# Patient Record
Sex: MALE | Race: BLACK OR AFRICAN AMERICAN | Employment: UNEMPLOYED | URBAN - METROPOLITAN AREA
[De-identification: names, ages, dates, MRNs, and addresses within clinical notes are randomized per-mention and may not be internally consistent; named-entity substitution may affect disease eponyms.]

---

## 2017-04-10 ENCOUNTER — OFFICE VISIT (OUTPATIENT)
Dept: SURGERY | Age: 52
End: 2017-04-10

## 2017-04-10 VITALS
SYSTOLIC BLOOD PRESSURE: 124 MMHG | DIASTOLIC BLOOD PRESSURE: 78 MMHG | TEMPERATURE: 98.8 F | WEIGHT: 182 LBS | HEIGHT: 72 IN | BODY MASS INDEX: 24.65 KG/M2 | OXYGEN SATURATION: 97 % | RESPIRATION RATE: 16 BRPM | HEART RATE: 69 BPM

## 2017-04-10 DIAGNOSIS — L72.12 TRICHILEMMAL CYST: Primary | ICD-10-CM

## 2017-04-10 RX ORDER — CETIRIZINE HCL 10 MG
TABLET ORAL
Refills: 0 | COMMUNITY
Start: 2017-02-28 | End: 2020-10-08

## 2017-04-10 RX ORDER — FUROSEMIDE 20 MG/1
20 TABLET ORAL DAILY
COMMUNITY
End: 2022-09-09

## 2017-04-10 RX ORDER — LISINOPRIL 10 MG/1
TABLET ORAL
Refills: 0 | COMMUNITY
Start: 2017-02-11 | End: 2020-10-08 | Stop reason: DRUGHIGH

## 2017-04-10 RX ORDER — CARVEDILOL 3.12 MG/1
TABLET ORAL
Refills: 6 | COMMUNITY
Start: 2017-03-17 | End: 2020-10-08 | Stop reason: DRUGHIGH

## 2017-04-10 NOTE — PROGRESS NOTES
1. Have you been to the ER, urgent care clinic since your last visit? Hospitalized since your last visit? No    2. Have you seen or consulted any other health care providers outside of the [address] [place] [address] since your last visit? Include any pap smears or colon screening.  No

## 2017-04-10 NOTE — PROGRESS NOTES
HISTORY OF PRESENT ILLNESS  Lore Mccormick is a 46 y.o. male who is referred by [facility], [facility], for further evaluation of a subcutaneous mass on the posterior aspect of his scalp. HPI Comments: Information obtained from family member. Mr. Garlan Snellen has had a subcutaneous mass on the posterior aspect of his scalp for approx. 4 months now. The mass intermittently becomes larger. Tx'd with abx and the mass decreases in size. He has otherwise been in his usual state of health. Past Medical History:  No date: Acid reflux  No date: Alcohol abuse  No date: Arthropathy, unspecified, site unspecified  No date: Atrial fibrillation ([address]  No date: Calcaneus fracture  No date: Condyloma  No date: Depression  No date: Essential hypertension  2006: H/O mitral valve repair      Comment: Along with MAZE procedure  No date: High cholesterol  No date: History of angina  No date: Hypertension  No date: IHSS (idiopathic hypertrophic subaortic stenos*  No date: Impotence of organic origin  05/2015: LA thrombus      Comment: LA and ARNULFO thrombus  2006: Pacemaker  No date: Thyroid disease  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 No pertinent family history. Social History: Employment - Disability. Tobacco - Black and Milds, one per day. EtOH - Beer, approx. 12 per week. Review of systems negative except as noted. Review of Systems   Constitutional:        Decreased appetite. HENT:        Sinus problems. Gastrointestinal: Positive for constipation and diarrhea. Psychiatric/Behavioral: Positive for depression. The patient is nervous/anxious.         Physical Exam   Constitutional: He appears well-developed and well-nourished. No distress. HENT:   Head: Normocephalic and atraumatic. Eyes: No scleral icterus. Neck: Neck supple. Cardiovascular: Normal rate and regular rhythm. Pulmonary/Chest: Effort normal and breath sounds normal.   Abdominal: Soft. He exhibits no distension. There is no tenderness. There is no rebound and no guarding. Musculoskeletal: Normal range of motion. Lymphadenopathy:     He has no cervical adenopathy. Neurological: He is alert. Skin:        Approx. 1cm x 1.5cm, well circumscribed, freely movable subcutaneous mass. No active infection. Clinically, this is c/w a trichilemmal cyst.  Well healed surgical scars on neck and chest.   Vitals reviewed. ASSESSMENT and PLAN  In view of the findings on H and P, Mr. Shannon Victoria should benefit from excision of the trichilemmal cyst as it is bothersome to him. Discussed procedure with him including risks of bleeding, infection, recurrence. He understands and wishes to proceed. I have tentatively scheduled Mr. Shannon Victoria for surgery on April 20, 2017 at [facility] and will see him back in the office postoperatively. He is agreeable to this plan of action and is most certainly free to contact the office should any questions or concerns arise. CC: Maggie Vail MD    [facility], [facility].

## 2017-04-10 NOTE — MR AVS SNAPSHOT
Visit Information Date & Time Provider Department Dept. Phone Encounter #  
 4/10/2017  2:20 PM MD [facility] [address] [phone] [id number] Upcoming Health Maintenance Date Due DTaP/Tdap/Td series (1 - Tdap) 9/24/1984 FOBT Q 1 YEAR AGE 50-75 9/24/2013 INFLUENZA AGE 9 TO ADULT 8/1/2016 Pneumococcal 19-64 Highest Risk (2 of 3 - PCV13) 8/18/2016 Allergies as of 4/10/2017  Review Complete On: 4/10/2017 By: Lauren Angel LPN Severity Noted Reaction Type Reactions Bactrim [Sulfamethoxazole-trimethoprim]  07/26/2012    Other (comments) GI Distress Current Immunizations  Reviewed on 8/13/2015 Name Date Pneumococcal Polysaccharide (PPSV-23) 8/18/2015  9:46 AM  
  
 Not reviewed this visit Vitals BP Pulse Temp Resp Height(growth percentile) Weight(growth percentile) 124/78 (BP 1 Location: Right arm, BP Patient Position: Sitting) 69 98.8 °F (37.1 °C) (Oral) 16 6' (1.829 m) 182 lb (82.6 kg) SpO2 BMI Smoking Status 97% 24.68 kg/m2 Former Smoker BMI and BSA Data Body Mass Index Body Surface Area  
 24.68 kg/m 2 2.05 m 2 Your Updated Medication List  
  
   
This list is accurate as of: 4/10/17  2:42 PM.  Always use your most recent med list.  
  
  
  
  
 carvedilol 3.125 mg tablet Commonly known as:  COREG  
TAKE 1 TABLET BY ORAL ROUTE 2 TIMES EVERY DAY  
  
 cetirizine 10 mg tablet Commonly known as:  ZYRTEC  
TAKE 1 TABLET BY MOUTH 2 TIMES A DAY FLUoxetine 20 mg capsule Commonly known as:  PROzac Take 80 mg by mouth daily. folic acid 1 mg tablet Commonly known as:  [employer] Take 1 mg by mouth daily. furosemide 20 mg tablet Commonly known as:  LASIX Take  by mouth daily. LIPITOR 20 mg tablet Generic drug:  atorvastatin Take 40 mg by mouth daily. lisinopril 10 mg tablet Commonly known as:  Louise Powers  
 TAKE 1 TABLET BY MOUTH EVERY DAY  
  
 mupirocin 2 % ointment Commonly known as:  [employer] Apply  to affected area three (3) times daily. ondansetron 4 mg disintegrating tablet Commonly known as:  ZOFRAN ODT Take 1 Tab by mouth every eight (8) hours as needed for Nausea. pantoprazole 40 mg tablet Commonly known as:  PROTONIX Take 40 mg by mouth daily. synthroid 25 mcg tablet Generic drug:  levothyroxine Take 50 mcg by mouth Daily (before breakfast). traZODone 50 mg tablet Commonly known as:  Blanca Ludwig Take 50 mg by mouth nightly. Indications: 1-3 tabs at bedtime. VENTOLIN HFA 90 mcg/actuation inhaler Generic drug:  albuterol Take 1 Puff by inhalation. XARELTO 20 mg Tab tablet Generic drug:  rivaroxaban Take 20 mg by mouth daily. Introducing [facility]! Cass Rivera introduces [employer] patient portal. Now you can access parts of your medical record, email your doctor's office, and request medication refills online. 1. In your internet browser, go to [email or web address] 2. Click on the First Time User? Click Here link in the Sign In box. You will see the New Member Sign Up page. 3. Enter your [employer]) Access Code exactly as it appears below. You will not need to use this code after youve completed the sign-up process. If you do not sign up before the expiration date, you must request a new code. · [employer] ([employer]) Access Code: [email or web address] Expires: 7/9/2017  2:42 PM 
 
4. Enter the last four digits of your Social Security Number (xxxx) and Date of Birth (mm/dd/yyyy) as indicated and click Submit. You will be taken to the next sign-up page. 5. Create a [employer] ([employer]) ID. This will be your [employer] login ID and cannot be changed, so think of one that is secure and easy to remember. 6. Create a [employer]) password. You can change your password at any time. 7. Enter your Password Reset Question and Answer. This can be used at a later time if you forget your password. 8. Enter your e-mail address. You will receive e-mail notification when new information is available in [address] 9. Click Sign Up. You can now view and download portions of your medical record. 10. Click the Download Summary menu link to download a portable copy of your medical information. If you have questions, please visit the Frequently Asked Questions section of the [employer] website. Remember, [employer] is NOT to be used for urgent needs. For medical emergencies, dial 911. Now available from your iPhone and Android! Please provide this summary of care documentation to your next provider. Your primary care clinician is listed as Alyson Meléndez. If you have any questions after today's visit, please call [phone].

## 2017-04-11 ENCOUNTER — TELEPHONE (OUTPATIENT)
Dept: SURGERY | Age: 52
End: 2017-04-11

## 2017-04-11 RX ORDER — BUPIVACAINE HYDROCHLORIDE 2.5 MG/ML
30 INJECTION, SOLUTION EPIDURAL; INFILTRATION; INTRACAUDAL ONCE
Status: CANCELLED | OUTPATIENT
Start: 2017-04-11 | End: 2017-04-11

## 2017-04-14 ENCOUNTER — HOSPITAL ENCOUNTER (OUTPATIENT)
Dept: PREADMISSION TESTING | Age: 52
Discharge: HOME | End: 2017-04-14
Payer: MEDICARE

## 2017-04-14 ENCOUNTER — HOSPITAL ENCOUNTER (OUTPATIENT)
Dept: GENERAL RADIOLOGY | Age: 52
Discharge: HOME | End: 2017-04-14
Attending: SURGERY
Payer: MEDICARE

## 2017-04-14 VITALS
SYSTOLIC BLOOD PRESSURE: 125 MMHG | TEMPERATURE: 97.6 F | WEIGHT: 180 LBS | RESPIRATION RATE: 16 BRPM | DIASTOLIC BLOOD PRESSURE: 84 MMHG | BODY MASS INDEX: 28.25 KG/M2 | HEIGHT: 67 IN | HEART RATE: 61 BPM

## 2017-04-14 LAB
ANION GAP BLD CALC-SCNC: 11 MMOL/L (ref 5–15)
APTT PPP: 27.3 SEC (ref 22.1–32.5)
ATRIAL RATE: 65 BPM
BASOPHILS # BLD AUTO: 0 K/UL (ref 0–0.1)
BASOPHILS # BLD: 1 % (ref 0–1)
BUN SERPL-MCNC: 17 MG/DL (ref 6–20)
BUN/CREAT SERPL: 14 (ref 12–20)
CALCIUM SERPL-MCNC: 9.7 MG/DL (ref 8.5–10.1)
CALCULATED P AXIS, ECG09: 53 DEGREES
CALCULATED R AXIS, ECG10: -8 DEGREES
CALCULATED T AXIS, ECG11: 46 DEGREES
CHLORIDE SERPL-SCNC: 100 MMOL/L (ref 97–108)
CO2 SERPL-SCNC: 26 MMOL/L (ref 21–32)
CREAT SERPL-MCNC: 1.24 MG/DL (ref 0.7–1.3)
DIAGNOSIS, 93000: NORMAL
DIFFERENTIAL METHOD BLD: ABNORMAL
EOSINOPHIL # BLD: 0.1 K/UL (ref 0–0.4)
EOSINOPHIL NFR BLD: 2 % (ref 0–7)
ERYTHROCYTE [DISTWIDTH] IN BLOOD BY AUTOMATED COUNT: 17.4 % (ref 11.5–14.5)
GLUCOSE SERPL-MCNC: 93 MG/DL (ref 65–100)
HCT VFR BLD AUTO: 47.9 % (ref 36.6–50.3)
HGB BLD-MCNC: 15.7 G/DL (ref 12.1–17)
INR PPP: 1.1 (ref 0.9–1.1)
LYMPHOCYTES # BLD AUTO: 25 % (ref 12–49)
LYMPHOCYTES # BLD: 1.2 K/UL (ref 0.8–3.5)
MCH RBC QN AUTO: 22.9 PG (ref 26–34)
MCHC RBC AUTO-ENTMCNC: 32.8 G/DL (ref 30–36.5)
MCV RBC AUTO: 69.8 FL (ref 80–99)
MONOCYTES # BLD: 0.6 K/UL (ref 0–1)
MONOCYTES NFR BLD AUTO: 12 % (ref 5–13)
NEUTS SEG # BLD: 2.9 K/UL (ref 1.8–8)
NEUTS SEG NFR BLD AUTO: 60 % (ref 32–75)
P-R INTERVAL, ECG05: 308 MS
PLATELET # BLD AUTO: 162 K/UL (ref 150–400)
POTASSIUM SERPL-SCNC: 4.6 MMOL/L (ref 3.5–5.1)
PROTHROMBIN TIME: 10.5 SEC (ref 9–11.1)
Q-T INTERVAL, ECG07: 452 MS
QRS DURATION, ECG06: 98 MS
QTC CALCULATION (BEZET), ECG08: 470 MS
RBC # BLD AUTO: 6.86 M/UL (ref 4.1–5.7)
RBC MORPH BLD: ABNORMAL
RBC MORPH BLD: ABNORMAL
SODIUM SERPL-SCNC: 137 MMOL/L (ref 136–145)
THERAPEUTIC RANGE,PTTT: NORMAL SECS (ref 58–77)
VENTRICULAR RATE, ECG03: 65 BPM
WBC # BLD AUTO: 4.8 K/UL (ref 4.1–11.1)

## 2017-04-14 PROCEDURE — 36415 COLL VENOUS BLD VENIPUNCTURE: CPT | Performed by: SURGERY

## 2017-04-14 PROCEDURE — 93005 ELECTROCARDIOGRAM TRACING: CPT

## 2017-04-14 PROCEDURE — 85025 COMPLETE CBC W/AUTO DIFF WBC: CPT | Performed by: SURGERY

## 2017-04-14 PROCEDURE — 85610 PROTHROMBIN TIME: CPT | Performed by: ANESTHESIOLOGY

## 2017-04-14 PROCEDURE — 71020 XR CHEST PA LAT: CPT

## 2017-04-14 PROCEDURE — 85730 THROMBOPLASTIN TIME PARTIAL: CPT | Performed by: ANESTHESIOLOGY

## 2017-04-14 PROCEDURE — 80048 BASIC METABOLIC PNL TOTAL CA: CPT | Performed by: SURGERY

## 2017-04-14 NOTE — PROGRESS NOTES
ekg explained to pt, ekg completed after no questions noted. ekg transmitted. zpt transported to radiology with noting to Ms. Starla Chaparro , that pt needs labs after.hard copy to Hemal Parks rn in [place]

## 2017-04-14 NOTE — PERIOP NOTES
[address]  Preoperative Instructions      Surgery Date 04/20/2017              Time of Arrival  7:30    1. On the day of your surgery, please report to the [facility] Entrance. If arriving prior to 7 AM please enter through the Emergency Room Entrance. 2. You must have a responsible adult to drive you to the hospital, stay at the hospital during your surgery and drive you home. You should have someone stay with you for the first 24 hours after your surgery. You should not drive a car for 24 hours following surgery. 3. Do not have anything to eat or drink ( including water, gum, mints, coffee, juice) after midnight       his may not apply to medications prescribed by your physician. Please note special instructions, if applicable. If you are currently taking Plavix, Coumadin, or other blood-thinning agents, contact your surgeon for instructions. 4. We recommend you do not drink any alcoholic beverages for 24 hours before and after your surgery. 5. Have a list of all current medications, including vitamins, herbal supplements and any other over the counter medications. Stop Asprin and non-steroidal anti-inflammatory drugs (I.e. Advil, Aleve) as directed by your surgeon's office. Stop all vitamins and herbal supplements seven days prior to your surgery. 6. Wear comfortable clothes. Wear glasses instead of contacts. Do not bring any money or jewelry. Do not wear make-up, particularly mascara, the morning of your surgery. Do not wear nail polish, particularly if you are having foot /hand surgery. Wear your hair loose or down, no ponytails, buns, debi pins or clips. All body piercings must be removed. Please shower before surgery with an antibacterial soap (Safeguard/Dial) and use a clean towel. Do not apply any lotions, powders, cologne, perfume or deodorants afterward.     Do not shave the area around your surgical incision for at least two to three days prior to your surgery. If you wear glasses, contacts, dentures and/or hearing aids, they will be removed prior to surgery. 7. You should understand that if you do not follow these instructions your surgery may be cancelled. If your physical condition changes (I.e. fever, cold or flu) please contact your surgeon as soon as possible. 8. It is important that you be on time. If a situation occurs where you may be late, please call [phone]    9. If you are feeling sick before surgery, call your surgeon. He or she will tell you what to do. If you are sick on the day of your surgery please call [phone].     10. If you have any questions and or problems, please call [phone] (Pre-admission Testing). 11. Your surgery time may be subject to change. You will receive a phone call the evening before your surgery if your time changes. Special Instructions: Stop taking the Xarelto on Monday as told by Dr. Villa Moy. Take Lisinopril and Carvedilol with a sip of water the morning of surgery. MEDICATIONS TO TAKE THE MORNING OF SURGERY WITH A SIP OF WATER:      I understand a pre-operative phone call will be made to verify my surgery time.   In the event that I am not available, I give permission for a message to be left on my answering service and/or with another person?   ________________      ___________________  _________  (Signature of Patient)          (Witness)                              (Date and Time)

## 2017-04-20 ENCOUNTER — HOSPITAL ENCOUNTER (OUTPATIENT)
Age: 52
Setting detail: OUTPATIENT SURGERY
Discharge: HOME | End: 2017-04-20
Attending: SURGERY | Admitting: SURGERY
Payer: MEDICARE

## 2017-04-20 ENCOUNTER — ANESTHESIA (OUTPATIENT)
Dept: SURGERY | Age: 52
End: 2017-04-20
Payer: MEDICARE

## 2017-04-20 ENCOUNTER — ANESTHESIA EVENT (OUTPATIENT)
Dept: SURGERY | Age: 52
End: 2017-04-20
Payer: MEDICARE

## 2017-04-20 VITALS
OXYGEN SATURATION: 99 % | DIASTOLIC BLOOD PRESSURE: 68 MMHG | HEIGHT: 67 IN | HEART RATE: 62 BPM | WEIGHT: 180 LBS | SYSTOLIC BLOOD PRESSURE: 102 MMHG | TEMPERATURE: 98 F | RESPIRATION RATE: 16 BRPM | BODY MASS INDEX: 28.25 KG/M2

## 2017-04-20 PROCEDURE — 76210000063 HC OR PH I REC FIRST 0.5 HR: Performed by: SURGERY

## 2017-04-20 PROCEDURE — 77030011640 HC PAD GRND REM COVD -A: Performed by: SURGERY

## 2017-04-20 PROCEDURE — 76010000138 HC OR TIME 0.5 TO 1 HR: Performed by: SURGERY

## 2017-04-20 PROCEDURE — 76210000020 HC REC RM PH II FIRST 0.5 HR: Performed by: SURGERY

## 2017-04-20 PROCEDURE — 77030018836 HC SOL IRR NACL ICUM -A: Performed by: SURGERY

## 2017-04-20 PROCEDURE — 74011000250 HC RX REV CODE- 250: Performed by: SURGERY

## 2017-04-20 PROCEDURE — 77030002916 HC SUT ETHLN J&J -A: Performed by: SURGERY

## 2017-04-20 PROCEDURE — 76060000032 HC ANESTHESIA 0.5 TO 1 HR: Performed by: SURGERY

## 2017-04-20 PROCEDURE — 74011250636 HC RX REV CODE- 250/636

## 2017-04-20 PROCEDURE — 77030031139 HC SUT VCRL2 J&J -A: Performed by: SURGERY

## 2017-04-20 PROCEDURE — 88304 TISSUE EXAM BY PATHOLOGIST: CPT | Performed by: SURGERY

## 2017-04-20 RX ORDER — SODIUM CHLORIDE, SODIUM LACTATE, POTASSIUM CHLORIDE, CALCIUM CHLORIDE 600; 310; 30; 20 MG/100ML; MG/100ML; MG/100ML; MG/100ML
INJECTION, SOLUTION INTRAVENOUS
Status: DISCONTINUED | OUTPATIENT
Start: 2017-04-20 | End: 2017-04-20 | Stop reason: HOSPADM

## 2017-04-20 RX ORDER — BACITRACIN 500 [USP'U]/G
OINTMENT TOPICAL AS NEEDED
Status: DISCONTINUED | OUTPATIENT
Start: 2017-04-20 | End: 2017-04-20 | Stop reason: HOSPADM

## 2017-04-20 RX ORDER — MIDAZOLAM HYDROCHLORIDE 1 MG/ML
INJECTION, SOLUTION INTRAMUSCULAR; INTRAVENOUS AS NEEDED
Status: DISCONTINUED | OUTPATIENT
Start: 2017-04-20 | End: 2017-04-20 | Stop reason: HOSPADM

## 2017-04-20 RX ORDER — BUPIVACAINE HYDROCHLORIDE 2.5 MG/ML
30 INJECTION, SOLUTION EPIDURAL; INFILTRATION; INTRACAUDAL ONCE
Status: DISCONTINUED | OUTPATIENT
Start: 2017-04-20 | End: 2017-04-20 | Stop reason: HOSPADM

## 2017-04-20 RX ORDER — HYDROCODONE BITARTRATE AND ACETAMINOPHEN 5; 325 MG/1; MG/1
1 TABLET ORAL
Qty: 6 TAB | Refills: 0 | Status: SHIPPED | OUTPATIENT
Start: 2017-04-20 | End: 2022-09-09

## 2017-04-20 RX ORDER — CEFAZOLIN SODIUM IN 0.9 % NACL 2 G/100 ML
PLASTIC BAG, INJECTION (ML) INTRAVENOUS AS NEEDED
Status: DISCONTINUED | OUTPATIENT
Start: 2017-04-20 | End: 2017-04-20 | Stop reason: HOSPADM

## 2017-04-20 RX ORDER — FENTANYL CITRATE 50 UG/ML
INJECTION, SOLUTION INTRAMUSCULAR; INTRAVENOUS AS NEEDED
Status: DISCONTINUED | OUTPATIENT
Start: 2017-04-20 | End: 2017-04-20 | Stop reason: HOSPADM

## 2017-04-20 RX ORDER — CEFAZOLIN SODIUM IN 0.9 % NACL 2 G/100 ML
PLASTIC BAG, INJECTION (ML) INTRAVENOUS
Status: DISCONTINUED
Start: 2017-04-20 | End: 2017-04-20 | Stop reason: HOSPADM

## 2017-04-20 RX ORDER — CEFAZOLIN SODIUM IN 0.9 % NACL 2 G/100 ML
2 PLASTIC BAG, INJECTION (ML) INTRAVENOUS
Status: DISCONTINUED | OUTPATIENT
Start: 2017-04-20 | End: 2017-04-20 | Stop reason: HOSPADM

## 2017-04-20 RX ORDER — BUPIVACAINE HYDROCHLORIDE AND EPINEPHRINE 2.5; 5 MG/ML; UG/ML
INJECTION, SOLUTION EPIDURAL; INFILTRATION; INTRACAUDAL; PERINEURAL AS NEEDED
Status: DISCONTINUED | OUTPATIENT
Start: 2017-04-20 | End: 2017-04-20 | Stop reason: HOSPADM

## 2017-04-20 RX ADMIN — MIDAZOLAM HYDROCHLORIDE 1 MG: 1 INJECTION, SOLUTION INTRAMUSCULAR; INTRAVENOUS at 08:57

## 2017-04-20 RX ADMIN — SODIUM CHLORIDE, SODIUM LACTATE, POTASSIUM CHLORIDE, CALCIUM CHLORIDE: 600; 310; 30; 20 INJECTION, SOLUTION INTRAVENOUS at 08:25

## 2017-04-20 RX ADMIN — MIDAZOLAM HYDROCHLORIDE 1 MG: 1 INJECTION, SOLUTION INTRAMUSCULAR; INTRAVENOUS at 09:11

## 2017-04-20 RX ADMIN — FENTANYL CITRATE 50 MCG: 50 INJECTION, SOLUTION INTRAMUSCULAR; INTRAVENOUS at 09:01

## 2017-04-20 RX ADMIN — Medication 2 G: at 08:46

## 2017-04-20 RX ADMIN — MIDAZOLAM HYDROCHLORIDE 1 MG: 1 INJECTION, SOLUTION INTRAMUSCULAR; INTRAVENOUS at 09:05

## 2017-04-20 RX ADMIN — FENTANYL CITRATE 50 MCG: 50 INJECTION, SOLUTION INTRAMUSCULAR; INTRAVENOUS at 08:57

## 2017-04-20 RX ADMIN — MIDAZOLAM HYDROCHLORIDE 2 MG: 1 INJECTION, SOLUTION INTRAMUSCULAR; INTRAVENOUS at 08:48

## 2017-04-20 NOTE — IP AVS SNAPSHOT
[address] [facility] 
 
 
 [address] Patient: Kiel Herrera MRN: [id number]:[id number] You are allergic to the following Allergen Reactions Bactrim (Sulfamethoxazole-Trimethoprim) Other (comments) GI Distress Recent Documentation Height Weight BMI Smoking Status 1.702 m 81.6 kg 28.19 kg/m2 Light Tobacco Smoker Emergency Contacts Name Discharge Info Relation Home Work Mobile WagnerPablo DISCHARGE CAREGIVER [3] Other Relative [6] [phone] About your hospitalization You were admitted on:  April 20, 2017 You last received care in the:  [facility] PACU/[facility] You were discharged on:  April 20, 2017 Unit phone number:  [phone] Why you were hospitalized Your primary diagnosis was:  Not on File Providers Seen During Your Hospitalizations Provider Role Specialty Primary office phone Mirian Taylor MD Attending Provider General Surgery [phone] Your Primary Care Physician (PCP) Primary Care Physician Office Phone Office Fax Lavell Henning [phone] Follow-up Information Follow up With Details Comments Contact Info Clay Knox MD   [address] [facility] [address] [address] 
[phone] Your Appointments Monday May 01, 2017  1:00 PM EDT  
POST OP 10 MIN with Arnold Foster NP  
[facility] [address] ([facility]) [address] [facility] [address] [phone] Thursday May 04, 2017  1:20 PM EDT  
POST OP 10 MIN with Mirian Taylor MD  
[address] [facility] ([facility] [address] [phone] Current Discharge Medication List  
  
START taking these medications Dose & Instructions Dispensing Information Comments Morning Noon Evening Bedtime HYDROcodone-acetaminophen 5-325 mg per tablet Commonly known as:  Cheo Ojedar Your last dose was: Your next dose is:    
   
   
 Dose:  1 Tab Take 1 Tab by mouth every four (4) hours as needed for Pain. Max Daily Amount: 6 Tabs. Quantity:  6 Tab Refills:  0 CONTINUE these medications which have NOT CHANGED Dose & Instructions Dispensing Information Comments Morning Noon Evening Bedtime  
 carvedilol 3.125 mg tablet Commonly known as:  Madeleine Velasco Your last dose was: Your next dose is: TAKE 1 TABLET BY ORAL ROUTE 2 TIMES EVERY DAY Refills:  6  
     
   
   
   
  
 cetirizine 10 mg tablet Commonly known as:  ZYRTEC Your last dose was: Your next dose is: TAKE 1 TABLET BY MOUTH 2 TIMES A DAY Refills:  0 FLUoxetine 20 mg capsule Commonly known as:  PROzac Your last dose was: Your next dose is:    
   
   
 Dose:  80 mg Take 80 mg by mouth daily. Refills:  0  
     
   
   
   
  
 folic acid 1 mg tablet Commonly known as:  [employer] Your last dose was: Your next dose is:    
   
   
 Dose:  1 mg Take 1 mg by mouth daily. Refills:  0  
     
   
   
   
  
 furosemide 20 mg tablet Commonly known as:  LASIX Your last dose was: Your next dose is: Take  by mouth daily. Refills:  0 LIPITOR 20 mg tablet Generic drug:  atorvastatin Your last dose was: Your next dose is:    
   
   
 Dose:  40 mg Take 40 mg by mouth daily. Refills:  0  
     
   
   
   
  
 lisinopril 10 mg tablet Commonly known as:  Minna Hernandez Your last dose was: Your next dose is: TAKE 1 TABLET BY MOUTH EVERY DAY Refills:  0  
     
   
   
   
  
 mupirocin 2 % ointment Commonly known as:  [facility] Your last dose was: Your next dose is:    
   
   
 Apply  to affected area three (3) times daily. Quantity:  22 g Refills:  0  
     
   
   
   
  
 ondansetron 4 mg disintegrating tablet Commonly known as:  ZOFRAN ODT Your last dose was: Your next dose is:    
   
   
 Dose:  4 mg Take 1 Tab by mouth every eight (8) hours as needed for Nausea. Quantity:  10 Tab Refills:  0  
     
   
   
   
  
 pantoprazole 40 mg tablet Commonly known as:  PROTONIX Your last dose was: Your next dose is:    
   
   
 Dose:  40 mg Take 40 mg by mouth daily. Refills:  0  
     
   
   
   
  
 synthroid 25 mcg tablet Generic drug:  levothyroxine Your last dose was: Your next dose is:    
   
   
 Dose:  50 mcg Take 50 mcg by mouth Daily (before breakfast). Refills:  0  
     
   
   
   
  
 traZODone 50 mg tablet Commonly known as:  Lequita Gallus Your last dose was: Your next dose is:    
   
   
 Dose:  50 mg Take 50 mg by mouth nightly. Indications: 1-3 tabs at bedtime. Refills:  0 VENTOLIN HFA 90 mcg/actuation inhaler Generic drug:  albuterol Your last dose was: Your next dose is:    
   
   
 Dose:  1 Puff Take 1 Puff by inhalation. Refills:  0 XARELTO 20 mg Tab tablet Generic drug:  rivaroxaban Your last dose was: Your next dose is:    
   
   
 Dose:  20 mg Take 20 mg by mouth daily. Refills:  0 Where to Get Your Medications Information on where to get these meds will be given to you by the nurse or doctor. ! Ask your nurse or doctor about these medications HYDROcodone-acetaminophen 5-325 mg per tablet Discharge Instructions DO NOT TAKE TYLENOL/ACETAMINOPHEN WITH PERCOCET, LORTAB, [address] TAKE NARCOTIC PAIN MEDICATIONS WITH FOOD. DO NOT DRIVE WHILE TAKING NARCOTIC PAIN MEDICATIONS. DISCHARGE SUMMARY from Nurse The following personal items collected during your admission are returned to you:  
Dental Appliance: Dental Appliances: None Vision:   
Hearing Aid:   
Jewelry: Jewelry: Earrings (given to sister) Clothing: Clothing:  (street clothes) Other Valuables: Other Valuables: None Valuables sent to safe:   
 
PATIENT INSTRUCTIONS: 
 
After general anesthesia or intravenous sedation, for 24 hours or while taking prescription Narcotics: 
      Someone should be with you for the next 24 hours. For your own safety, a responsible adult must drive you home. · Limit your activities · Do not drive and operate hazardous machinery · Do not make important personal or business decisions · Do  not drink alcoholic beverages · If you have not urinated within 8 hours after discharge, please contact your surgeon on call. Report the following to your surgeon: 
· Excessive pain, swelling, redness or odor of or around the surgical area · Temperature over 100.5 · Nausea and vomiting lasting longer than 4 hours or if unable to take medications · Any signs of decreased circulation or nerve impairment to extremity: change in color, persistent  numbness, tingling, coldness or increase pain ·  
·  
· You will receive a Post Operative Call from one of the Recovery Room Nurses on the day after your surgery to check on you. It is very important for us to know how you are recovering after your surgery. ·  
· You may receive a call from Dori who will survey your experience here at [facility]. Please take the time to complete the phone survey when called, as your feedback and suggestions are very important to us. Our goal is to provide you excellent care in a safe and friendly environment. ·  
·  
· We wish youre a speedy recovery ? What to do at Home: 
 
Recommended activity: Activity as tolerated and no driving for today, Do not climb stairs or shower unattended for the next 24 hours. *  Please give a list of your current medications to your Primary Care Provider. *  Please update this list whenever your medications are discontinued, doses are 
    changed, or new medications (including over-the-counter products) are added. *  Please carry medication information at all times in case of emergency situations. These are general instructions for a healthy lifestyle: No smoking/ No tobacco products/ Avoid exposure to second hand smoke Surgeon General's Warning:  Quitting smoking now greatly reduces serious risk to your health. Obesity, smoking, and sedentary lifestyle greatly increases your risk for illness A healthy diet, regular physical exercise & weight monitoring are important for maintaining a healthy lifestyle You may be retaining fluid if you have a history of heart failure or if you experience any of the following symptoms:  Weight gain of 3 pounds or more overnight or 5 pounds in a week, increased swelling in our hands or feet or shortness of breath while lying flat in bed. Please call your doctor as soon as you notice any of these symptoms; do not wait until your next office visit. Recognize signs and symptoms of STROKE: 
 
F-face looks uneven A-arms unable to move or move even S-speech slurred or non-existent T-time-call 911 as soon as signs and symptoms begin-DO NOT go Back to bed or wait to see if you get better-TIME IS BRAIN. If you have not received your influenza and/or pneumococcal vaccine, please follow up with your primary care physician. The discharge information has been reviewed with the patient and caregiver. The patient and caregiver verbalized understanding. Patient Discharge Instructions Minnie / [id number] : 1965 Admitted 2017 Discharged: 2017 · It is important that you take the medication exactly as they are prescribed. · Keep your medication in the bottles provided by the pharmacist and keep a list of the medication names, dosages, and times to be taken in your wallet. · Do not take other medications without consulting your doctor. What to do at [facility] Recommended diet: Regular. Recommended activity: No Restrictions. May Take Shower or [place] Roxo. Can Restart Xarelto on 4/21/2017. If you experience any of the following symptoms Fevers, Chills, Nausea, Vomitting, Redness or Drainage at Surgical Site(s) or Any Other Questions or Concerns Please Call -  [phone]. Follow-up with Mary Villafuerte NP on May 1, 2017 at 1:00PM at [address] Information obtained by : 
I understand that if any problems occur once I am at home I am to contact my physician. I understand and acknowledge receipt of the instructions indicated above. Physician's or R.N.'s Signature                                                                  Date/Time Patient or Representative Signature                                                          Date/Time Discharge Orders None Introducing [facility] SERVICES! Karri Harding introduces [employer] patient portal. Now you can access parts of your medical record, email your doctor's office, and request medication refills online. 1. In your internet browser, go to [email or web address] 2. Click on the First Time User? Click Here link in the Sign In box. You will see the New Member Sign Up page. 3. Enter your [email or web address] Access Code exactly as it appears below. You will not need to use this code after youve completed the sign-up process.  If you do not sign up before the expiration date, you must request a new code. · [email or web address] Access Code: [email or web address] Expires: 7/9/2017  2:42 PM 
 
4. Enter the last four digits of your Social Security Number (xxxx) and Date of Birth (mm/dd/yyyy) as indicated and click Submit. You will be taken to the next sign-up page. 5. Create a cube19 ID. This will be your cube19 login ID and cannot be changed, so think of one that is secure and easy to remember. 6. Create a cube19 password. You can change your password at any time. 7. Enter your Password Reset Question and Answer. This can be used at a later time if you forget your password. 8. Enter your e-mail address. You will receive e-mail notification when new information is available in [address] 9. Click Sign Up. You can now view and download portions of your medical record. 10. Click the Download Summary menu link to download a portable copy of your medical information. If you have questions, please visit the Frequently Asked Questions section of the cube19 website. Remember, cube19 is NOT to be used for urgent needs. For medical emergencies, dial 911. Now available from your iPhone and Android! General Information Please provide this summary of care documentation to your next provider. Patient Signature:  ____________________________________________________________ Date:  ____________________________________________________________  
  
Ulises Breath Provider Signature:  ____________________________________________________________ Date:  ____________________________________________________________

## 2017-04-20 NOTE — H&P
Date of Surgery Update:  Edgard Hou was seen and examined. History and physical has been reviewed. The patient has been examined. There have been no significant clinical changes since the completion of the originally dated History and Physical.  Patient identified by surgeon; surgical site was confirmed by patient and surgeon. Signed By: Paty Angel MD     April 20, 2017 8:12 AM         Please note from the office and include the additional information below:    Past Medical History  Past Medical History:   Diagnosis Date    Acid reflux     Alcohol abuse     Arthropathy, unspecified, site unspecified     Atrial fibrillation ([address]     Calcaneus fracture     Condyloma     Depression     Essential hypertension     H/O mitral valve repair 2006    Along with MAZE procedure    High cholesterol     History of angina     Hypertension     IHSS (idiopathic hypertrophic subaortic stenosis) ([address]     Ill-defined condition     pacemaker    Impotence of organic origin     LA thrombus 05/2015    LA and ARNULFO thrombus    Pacemaker 2006    Thyroid disease     Trichilemmal cyst 4/10/2017    Unspecified cerebral artery occlusion with cerebral infarction         Past Surgical History  Past Surgical History:   Procedure Laterality Date    CARDIAC SURG PROCEDURE UNLIST  07/13/2006    Mitral Valve Replacement    HX APPENDECTOMY      HX CYST REMOVAL      HX HEART CATHETERIZATION      HX OTHER SURGICAL  4/2005    HX PACEMAKER  07/14/2006        Social History  The patient Edgard Hou  reports that he has been smoking. He has never used smokeless tobacco. He reports that he drinks alcohol. He reports that he does not use illicit drugs. Family History  History reviewed. No pertinent family history.

## 2017-04-20 NOTE — BRIEF OP NOTE
BRIEF OPERATIVE NOTE    Date of Procedure: 4/20/2017   Preoperative Diagnosis:  Scalp Mass. Postoperative Diagnosis:  Same. Procedure(s):   Excise Scalp Mass. Surgeon(s) and Role:   Filemon Strauss MD - Primary  Surgical Staff:  Circ-1: Ronda Emmanuel  Scrub Tech-1: Yvon Dong  Float Staff: Juan Jensen RN  Event Time In   Incision Start 4901   Incision Close 1548     Anesthesia: MAC   Estimated Blood Loss: Minimal.  Specimens:   ID Type Source Tests Collected by Time Destination   1 : Scalp Mass Preservative Scalp  Filemon Strauss MD 4/20/2017 0075 Pathology      Findings: Less than 1cm x 1cm subcutaneous mass. Clinically, c/w a trichilemmal cyst.   Complications: None.   Implants: * No implants in log *

## 2017-04-20 NOTE — ANESTHESIA PREPROCEDURE EVALUATION
Anesthetic History   No history of anesthetic complications            Review of Systems / Medical History  Patient summary reviewed and pertinent labs reviewed    Pulmonary            Asthma        Neuro/Psych       CVA  TIA and psychiatric history     Cardiovascular    Hypertension        Dysrhythmias   Pacemaker         GI/Hepatic/Renal     GERD           Endo/Other      Hypothyroidism       Other Findings              Physical Exam    Airway  Mallampati: I  TM Distance: > 6 cm  Neck ROM: normal range of motion   Mouth opening: Normal     Cardiovascular    Rhythm: regular  Rate: normal         Dental    Dentition: Upper dentition intact and Lower dentition intact     Pulmonary  Breath sounds clear to auscultation               Abdominal  GI exam deferred       Other Findings            Anesthetic Plan    ASA: 3  Anesthesia type: MAC            Anesthetic plan and risks discussed with: Patient

## 2017-04-20 NOTE — DISCHARGE INSTRUCTIONS
DO NOT TAKE TYLENOL/ACETAMINOPHEN WITH PERCOCET, LORTAB, [address] TAKE NARCOTIC PAIN MEDICATIONS WITH FOOD. DO NOT DRIVE WHILE TAKING NARCOTIC PAIN MEDICATIONS. DISCHARGE SUMMARY from Nurse    The following personal items collected during your admission are returned to you:   Dental Appliance: Dental Appliances: None  Vision:    Hearing Aid:    Jewelry: Jewelry: Earrings (given to sister)  Clothing: Clothing:  (street clothes)  Other Valuables: Other Valuables: None  Valuables sent to safe:      PATIENT INSTRUCTIONS:    After general anesthesia or intravenous sedation, for 24 hours or while taking prescription Narcotics:        Someone should be with you for the next 24 hours. For your own safety, a responsible adult must drive you home. · Limit your activities  · Do not drive and operate hazardous machinery  · Do not make important personal or business decisions  · Do  not drink alcoholic beverages  · If you have not urinated within 8 hours after discharge, please contact your surgeon on call. Report the following to your surgeon:  · Excessive pain, swelling, redness or odor of or around the surgical area  · Temperature over 100.5  · Nausea and vomiting lasting longer than 4 hours or if unable to take medications  · Any signs of decreased circulation or nerve impairment to extremity: change in color, persistent  numbness, tingling, coldness or increase pain  ·   ·   · You will receive a Post Operative Call from one of the Recovery Room Nurses on the day after your surgery to check on you. It is very important for us to know how you are recovering after your surgery. ·   · You may receive a call from Dori who will survey your experience here at [facility] Please take the time to complete the phone survey when called, as your feedback and suggestions are very important to us. Our goal is to provide you excellent care in a safe and friendly environment.   ·   ·   · We wish youre a speedy recovery ? What to do at Home:    Recommended activity: Activity as tolerated and no driving for today, Do not climb stairs or shower unattended for the next 24 hours. *  Please give a list of your current medications to your Primary Care Provider. *  Please update this list whenever your medications are discontinued, doses are      changed, or new medications (including over-the-counter products) are added. *  Please carry medication information at all times in case of emergency situations. These are general instructions for a healthy lifestyle:    No smoking/ No tobacco products/ Avoid exposure to second hand smoke    Surgeon General's Warning:  Quitting smoking now greatly reduces serious risk to your health. Obesity, smoking, and sedentary lifestyle greatly increases your risk for illness    A healthy diet, regular physical exercise & weight monitoring are important for maintaining a healthy lifestyle    You may be retaining fluid if you have a history of heart failure or if you experience any of the following symptoms:  Weight gain of 3 pounds or more overnight or 5 pounds in a week, increased swelling in our hands or feet or shortness of breath while lying flat in bed. Please call your doctor as soon as you notice any of these symptoms; do not wait until your next office visit. Recognize signs and symptoms of STROKE:    F-face looks uneven    A-arms unable to move or move even    S-speech slurred or non-existent    T-time-call 911 as soon as signs and symptoms begin-DO NOT go       Back to bed or wait to see if you get better-TIME IS BRAIN. If you have not received your influenza and/or pneumococcal vaccine, please follow up with your primary care physician. The discharge information has been reviewed with the patient and caregiver. The patient and caregiver verbalized understanding.       Patient Discharge Instructions    Nellie Ruiz / [id number] : 1965    Admitted 2017 Discharged: 2017       · It is important that you take the medication exactly as they are prescribed. · Keep your medication in the bottles provided by the pharmacist and keep a list of the medication names, dosages, and times to be taken in your wallet. · Do not take other medications without consulting your doctor. What to do at Home    Recommended diet: Regular. Recommended activity: No Restrictions. May Take Shower or [place] Roxo. Can Restart Xarelto on 2017. If you experience any of the following symptoms Fevers, Chills, Nausea, Vomitting, Redness or Drainage at Surgical Site(s) or Any Other Questions or Concerns Please Call -  [phone]. Follow-up with Abhinav Monte NP on May 1, 2017 at 1:00PM at [facility]. Information obtained by :  I understand that if any problems occur once I am at home I am to contact my physician. I understand and acknowledge receipt of the instructions indicated above.                                                                                                                                            Physician's or R.N.'s Signature                                                                  Date/Time                                                                                                                                              Patient or Representative Signature                                                          Date/Time

## 2017-04-20 NOTE — OP NOTES
[address], [address]   OP NOTE       Name:  Albaro Grey   MR#:  [id number]   :  1965   Account #:  [de-identified]    Surgery Date:  2017   Date of Adm:  2017       PREOPERATIVE DIAGNOSIS: Scalp mass. POSTOPERATIVE DIAGNOSIS: Scalp mass. PROCEDURE PERFORMED: Excise scalp mass. SURGEON: Betty Gosselin. Adriana Colvin MD     ANESTHESIA: Local with sedation. ESTIMATED BLOOD LOSS: Minimal.      CRYSTALLOID: 300 mL. SPECIMENS REMOVED: Scalp mass to Pathology. DRAINS: None. COMPLICATIONS: None. INDICATIONS FOR SURGERY: The patient is a 78-year-old man with   a subcutaneous mass on the posterior aspect of the scalp. The patient   was brought to the operating room at this time for excision of the mass. The risks of the procedure, including but not limited to bleeding,   infection, and recurrence were discussed in detail with the patient and   his power of . They understood and wished to proceed. DESCRIPTION OF PROCEDURE: After consent was obtained, the   patient was brought to the operating room where he was placed in the   supine position on the operating room table. Following the induction of   an adequate level of intravenous sedation, compression devices were   placed on both lower extremities. The patient was then placed on his   left side and the posterior aspect of the scalp prepped with Betadine   and draped as a sterile field. Local anesthetic was infiltrated and an incision over the mass opened   sharply. Subcutaneous bleeders were carefully cauterized. The   incision was carried down to the mass, which was readily identified and   dissected free circumferentially. Clinically, the mass appeared to be   consistent with a trichilemmal cyst and was less than 1 x 1 cm. The   specimen was then passed off the field and submitted for   histopathologic evaluation.  The wound was inspected and several   bleeders cauterized. Incision was then closed with an interrupted 3-0 Vicryl suture followed   by 3-0 nylon vertical mattress sutures to the skin. Additional local   anesthetic was infiltrated and pressure was held. The incision was   dressed with antibiotic ointment. The patient returned to the supine   position on the operating room table and transferred to the stretcher. He was brought to the recovery room in stable condition, having   tolerated the procedure well. At the conclusion of the procedure, all   sponge counts, instrument counts, and needle counts were reported as   correct x2.         Hawa Landeros MD      [facility] / [facility]   D:  04/20/2017   09:36   T:  04/20/2017   10:14   Job #:  [id number]

## 2017-04-20 NOTE — IP AVS SNAPSHOT
Current Discharge Medication List  
  
START taking these medications Dose & Instructions Dispensing Information Comments Morning Noon Evening Bedtime HYDROcodone-acetaminophen 5-325 mg per tablet Commonly known as:  Emelina Muse Your last dose was: Your next dose is:    
   
   
 Dose:  1 Tab Take 1 Tab by mouth every four (4) hours as needed for Pain. Max Daily Amount: 6 Tabs. Quantity:  6 Tab Refills:  0 CONTINUE these medications which have NOT CHANGED Dose & Instructions Dispensing Information Comments Morning Noon Evening Bedtime  
 carvedilol 3.125 mg tablet Commonly known as:  Anita Childs Your last dose was: Your next dose is: TAKE 1 TABLET BY ORAL ROUTE 2 TIMES EVERY DAY Refills:  6  
     
   
   
   
  
 cetirizine 10 mg tablet Commonly known as:  ZYRTEC Your last dose was: Your next dose is: TAKE 1 TABLET BY MOUTH 2 TIMES A DAY Refills:  0 FLUoxetine 20 mg capsule Commonly known as:  PROzac Your last dose was: Your next dose is:    
   
   
 Dose:  80 mg Take 80 mg by mouth daily. Refills:  0  
     
   
   
   
  
 folic acid 1 mg tablet Commonly known as:  [employer] Your last dose was: Your next dose is:    
   
   
 Dose:  1 mg Take 1 mg by mouth daily. Refills:  0  
     
   
   
   
  
 furosemide 20 mg tablet Commonly known as:  LASIX Your last dose was: Your next dose is: Take  by mouth daily. Refills:  0 LIPITOR 20 mg tablet Generic drug:  atorvastatin Your last dose was: Your next dose is:    
   
   
 Dose:  40 mg Take 40 mg by mouth daily. Refills:  0  
     
   
   
   
  
 lisinopril 10 mg tablet Commonly known as:  Shakir Daft Your last dose was: Your next dose is: TAKE 1 TABLET BY MOUTH EVERY DAY Refills:  0  
     
   
   
   
  
 mupirocin 2 % ointment Commonly known as:  [employer] [facility] Your last dose was: Your next dose is:    
   
   
 Apply  to affected area three (3) times daily. Quantity:  22 g Refills:  0  
     
   
   
   
  
 ondansetron 4 mg disintegrating tablet Commonly known as:  ZOFRAN ODT Your last dose was: Your next dose is:    
   
   
 Dose:  4 mg Take 1 Tab by mouth every eight (8) hours as needed for Nausea. Quantity:  10 Tab Refills:  0  
     
   
   
   
  
 pantoprazole 40 mg tablet Commonly known as:  PROTONIX Your last dose was: Your next dose is:    
   
   
 Dose:  40 mg Take 40 mg by mouth daily. Refills:  0  
     
   
   
   
  
 synthroid 25 mcg tablet Generic drug:  levothyroxine Your last dose was: Your next dose is:    
   
   
 Dose:  50 mcg Take 50 mcg by mouth Daily (before breakfast). Refills:  0  
     
   
   
   
  
 traZODone 50 mg tablet Commonly known as:  Lakhwinder Cushing Your last dose was: Your next dose is:    
   
   
 Dose:  50 mg Take 50 mg by mouth nightly. Indications: 1-3 tabs at bedtime. Refills:  0 VENTOLIN HFA 90 mcg/actuation inhaler Generic drug:  albuterol Your last dose was: Your next dose is:    
   
   
 Dose:  1 Puff Take 1 Puff by inhalation. Refills:  0 XARELTO 20 mg Tab tablet Generic drug:  rivaroxaban Your last dose was: Your next dose is:    
   
   
 Dose:  20 mg Take 20 mg by mouth daily. Refills:  0 Where to Get Your Medications Information on where to get these meds will be given to you by the nurse or doctor. ! Ask your nurse or doctor about these medications HYDROcodone-acetaminophen 5-325 mg per tablet

## 2017-04-20 NOTE — PERIOP NOTES
pateint and sister educated on discharge instructions by RN staff and Dr. Elke Sanchez. Provided opportunities to ask questions and verbalize understanding. Provided with written materials. Pt taken out i wheelchair by staff to car.

## 2017-04-20 NOTE — PERIOP NOTES
Handoff Report from Operating Room to PACU    Report received from Dr. Vianey Ward and Shin Valladares regarding [facility]. Surgeon(s):  Wendy Mulligan MD  And Procedure(s) (LRB):  SCALP MASS EXCISION  (N/A)  confirmed   with allergies and dressings discussed. Anesthesia type, drugs, patient history, complications, estimated blood loss, vital signs, intake and output, and last pain medication, lines and temperature were reviewed.

## 2017-04-20 NOTE — ANESTHESIA POSTPROCEDURE EVALUATION
Post-Anesthesia Evaluation and Assessment    Patient: Diana Crespo MRN: [id number]  SSN: [id number]    YOB: 1965  Age: 46 y.o. Sex: male       Cardiovascular Function/Vital Signs  Visit Vitals    /68 (BP 1 Location: Left arm, BP Patient Position: At rest)    Pulse 62    Temp 36.7 °C (98 °F)    Resp 16    Ht 5' 7\" (1.702 m)    Wt 81.6 kg (180 lb)    SpO2 99%    BMI 28.19 kg/m2       Patient is status post MAC anesthesia for Procedure(s):  SCALP MASS EXCISION . Nausea/Vomiting: None    Postoperative hydration reviewed and adequate. Pain:  Pain Scale 1: Numeric (0 - 10) (04/20/17 0950)  Pain Intensity 1: 0 (04/20/17 0950)   Managed    Neurological Status:   Neuro (WDL): Within Defined Limits (04/20/17 0950)  Neuro  Neurologic State: Alert; Appropriate for age (pt mostly nonverbal secondary to stroke) (04/20/17 0950)  LUE Motor Response: Purposeful (04/20/17 0950)  LLE Motor Response: Purposeful (04/20/17 0950)  RUE Motor Response: Purposeful (04/20/17 0950)  RLE Motor Response: Purposeful (04/20/17 0950)   At baseline    Mental Status and Level of Consciousness: Arousable    Pulmonary Status:   O2 Device: Room air (04/20/17 0950)   Adequate oxygenation and airway patent    Complications related to anesthesia: None    Post-anesthesia assessment completed.  No concerns    Signed By: Bhargavi Roe MD     April 20, 2017

## 2017-05-01 ENCOUNTER — OFFICE VISIT (OUTPATIENT)
Dept: SURGERY | Age: 52
End: 2017-05-01

## 2017-05-01 VITALS
OXYGEN SATURATION: 98 % | TEMPERATURE: 98 F | SYSTOLIC BLOOD PRESSURE: 122 MMHG | HEIGHT: 67 IN | BODY MASS INDEX: 28.25 KG/M2 | HEART RATE: 84 BPM | DIASTOLIC BLOOD PRESSURE: 80 MMHG | RESPIRATION RATE: 16 BRPM | WEIGHT: 180 LBS

## 2017-05-01 DIAGNOSIS — Z09 POSTOPERATIVE EXAMINATION: Primary | ICD-10-CM

## 2017-05-01 NOTE — PROGRESS NOTES
1. Have you been to the ER, urgent care clinic since your last visit? Hospitalized since your last visit? No    2. Have you seen or consulted any other health care providers outside of the [address] [place] [address] since your last visit? Include any pap smears or colon screening.  No

## 2017-05-01 NOTE — PROGRESS NOTES
Subjective:    Iliana Corona is a 46 y.o. male presents for postop care following excision of scalp cyst by Dr. Carlota Cabrera on 4/20/17. He is receiving wound care by self who reports no problems with wound care. The patient is not having any pain, although area is tender. .    Advance directive not on file      Objective:     Visit Vitals    /80 (BP 1 Location: Right arm, BP Patient Position: Sitting)    Pulse 84    Temp 98 °F (36.7 °C) (Oral)    Resp 16    Ht 5' 7\" (1.702 m)    Wt 180 lb (81.6 kg)    SpO2 98%    BMI 28.19 kg/m2         Wound:  Location: posterior scalp  clean, dry, healed, closed with stitches  periwound skin intact, no erythema or induration        Pathology:     FINAL PATHOLOGIC DIAGNOSIS   Scalp mass (trichilemmal cyst):   Skin with granulation tissue with acute and chronic inflammation, consistent with ruptured trichilemmal cyst     Assessment:     S/P excision of scalp cyst.  Doing well postoperatively. Plan:     1. Wound care discussed. Removed stitches wihtout incident  2. Pt is to increase activities as tolerated. .  3. Follow-up prn    Mr. Collette Dunnings has a reminder for a \"due or due soon\" health maintenance. I have asked that he contact his primary care provider for follow-up on this health maintenance. Patient verbalized understanding and agreement.

## 2020-10-02 ENCOUNTER — HOME HEALTH ADMISSION (OUTPATIENT)
Dept: HOME HEALTH SERVICES | Facility: HOME HEALTH | Age: 55
End: 2020-10-02
Payer: MEDICARE

## 2020-10-07 ENCOUNTER — HOME CARE VISIT (OUTPATIENT)
Dept: SCHEDULING | Facility: HOME HEALTH | Age: 55
End: 2020-10-07
Payer: MEDICARE

## 2020-10-07 PROCEDURE — 400013 HH SOC

## 2020-10-07 PROCEDURE — 3331090002 HH PPS REVENUE DEBIT

## 2020-10-07 PROCEDURE — 3331090001 HH PPS REVENUE CREDIT

## 2020-10-07 PROCEDURE — G0299 HHS/HOSPICE OF RN EA 15 MIN: HCPCS

## 2020-10-07 PROCEDURE — G0151 HHCP-SERV OF PT,EA 15 MIN: HCPCS

## 2020-10-08 VITALS
TEMPERATURE: 99.3 F | RESPIRATION RATE: 18 BRPM | DIASTOLIC BLOOD PRESSURE: 74 MMHG | HEART RATE: 71 BPM | SYSTOLIC BLOOD PRESSURE: 142 MMHG | OXYGEN SATURATION: 100 %

## 2020-10-08 PROCEDURE — 3331090002 HH PPS REVENUE DEBIT

## 2020-10-08 PROCEDURE — 3331090001 HH PPS REVENUE CREDIT

## 2020-10-09 ENCOUNTER — HOME CARE VISIT (OUTPATIENT)
Dept: SCHEDULING | Facility: HOME HEALTH | Age: 55
End: 2020-10-09
Payer: MEDICARE

## 2020-10-09 VITALS
HEART RATE: 82 BPM | DIASTOLIC BLOOD PRESSURE: 72 MMHG | TEMPERATURE: 98.4 F | OXYGEN SATURATION: 98 % | SYSTOLIC BLOOD PRESSURE: 126 MMHG

## 2020-10-09 PROCEDURE — 3331090002 HH PPS REVENUE DEBIT

## 2020-10-09 PROCEDURE — G0151 HHCP-SERV OF PT,EA 15 MIN: HCPCS

## 2020-10-09 PROCEDURE — 3331090001 HH PPS REVENUE CREDIT

## 2020-10-09 PROCEDURE — G0299 HHS/HOSPICE OF RN EA 15 MIN: HCPCS

## 2020-10-09 PROCEDURE — G0153 HHCP-SVS OF S/L PATH,EA 15MN: HCPCS

## 2020-10-10 VITALS
RESPIRATION RATE: 18 BRPM | TEMPERATURE: 97.9 F | SYSTOLIC BLOOD PRESSURE: 188 MMHG | DIASTOLIC BLOOD PRESSURE: 66 MMHG | OXYGEN SATURATION: 99 % | HEART RATE: 68 BPM

## 2020-10-10 PROCEDURE — 3331090002 HH PPS REVENUE DEBIT

## 2020-10-10 PROCEDURE — 3331090001 HH PPS REVENUE CREDIT

## 2020-10-11 VITALS — TEMPERATURE: 98.1 F | OXYGEN SATURATION: 98 % | HEART RATE: 70 BPM

## 2020-10-11 PROCEDURE — 3331090001 HH PPS REVENUE CREDIT

## 2020-10-11 PROCEDURE — 3331090002 HH PPS REVENUE DEBIT

## 2020-10-12 ENCOUNTER — HOME CARE VISIT (OUTPATIENT)
Dept: HOME HEALTH SERVICES | Facility: HOME HEALTH | Age: 55
End: 2020-10-12
Payer: MEDICARE

## 2020-10-12 VITALS
HEART RATE: 71 BPM | SYSTOLIC BLOOD PRESSURE: 118 MMHG | OXYGEN SATURATION: 99 % | TEMPERATURE: 97.9 F | DIASTOLIC BLOOD PRESSURE: 66 MMHG

## 2020-10-12 PROCEDURE — 3331090001 HH PPS REVENUE CREDIT

## 2020-10-12 PROCEDURE — 3331090002 HH PPS REVENUE DEBIT

## 2020-10-13 ENCOUNTER — HOME CARE VISIT (OUTPATIENT)
Dept: HOME HEALTH SERVICES | Facility: HOME HEALTH | Age: 55
End: 2020-10-13
Payer: MEDICARE

## 2020-10-13 ENCOUNTER — HOME CARE VISIT (OUTPATIENT)
Dept: SCHEDULING | Facility: HOME HEALTH | Age: 55
End: 2020-10-13
Payer: MEDICARE

## 2020-10-13 VITALS — DIASTOLIC BLOOD PRESSURE: 86 MMHG | SYSTOLIC BLOOD PRESSURE: 135 MMHG | HEART RATE: 71 BPM | TEMPERATURE: 98 F

## 2020-10-13 PROCEDURE — G0153 HHCP-SVS OF S/L PATH,EA 15MN: HCPCS

## 2020-10-13 PROCEDURE — G0152 HHCP-SERV OF OT,EA 15 MIN: HCPCS

## 2020-10-13 PROCEDURE — 3331090001 HH PPS REVENUE CREDIT

## 2020-10-13 PROCEDURE — 3331090002 HH PPS REVENUE DEBIT

## 2020-10-14 ENCOUNTER — HOME CARE VISIT (OUTPATIENT)
Dept: SCHEDULING | Facility: HOME HEALTH | Age: 55
End: 2020-10-14
Payer: MEDICARE

## 2020-10-15 ENCOUNTER — HOME CARE VISIT (OUTPATIENT)
Dept: HOME HEALTH SERVICES | Facility: HOME HEALTH | Age: 55
End: 2020-10-15
Payer: MEDICARE

## 2020-11-06 PROCEDURE — 400013 HH SOC

## 2021-04-08 ENCOUNTER — TRANSCRIBE ORDER (OUTPATIENT)
Dept: SCHEDULING | Age: 56
End: 2021-04-08

## 2021-04-08 DIAGNOSIS — R18.8 ASCITES: Primary | ICD-10-CM

## 2021-04-14 ENCOUNTER — HOSPITAL ENCOUNTER (OUTPATIENT)
Dept: CT IMAGING | Age: 56
Discharge: HOME | End: 2021-04-14
Attending: FAMILY MEDICINE
Payer: MEDICARE

## 2021-04-14 DIAGNOSIS — R18.8 ASCITES: ICD-10-CM

## 2021-04-14 LAB — CREAT BLD-MCNC: 1.3 MG/DL (ref 0.6–1.3)

## 2021-04-14 PROCEDURE — 74177 CT ABD & PELVIS W/CONTRAST: CPT

## 2021-04-14 PROCEDURE — 74011000636 HC RX REV CODE- 636: Performed by: RADIOLOGY

## 2021-04-14 PROCEDURE — 82565 ASSAY OF CREATININE: CPT

## 2021-04-14 RX ADMIN — IOPAMIDOL 100 ML: 755 INJECTION, SOLUTION INTRAVENOUS at 10:00

## 2021-08-13 ENCOUNTER — OFFICE VISIT (OUTPATIENT)
Dept: HEMATOLOGY | Age: 56
End: 2021-08-13
Payer: MEDICARE

## 2021-08-13 VITALS
RESPIRATION RATE: 16 BRPM | TEMPERATURE: 96.9 F | HEART RATE: 60 BPM | DIASTOLIC BLOOD PRESSURE: 82 MMHG | HEIGHT: 67 IN | OXYGEN SATURATION: 99 % | SYSTOLIC BLOOD PRESSURE: 131 MMHG | WEIGHT: 169 LBS | BODY MASS INDEX: 26.53 KG/M2

## 2021-08-13 DIAGNOSIS — R74.8 ABNORMAL LIVER ENZYMES: Primary | ICD-10-CM

## 2021-08-13 DIAGNOSIS — Z11.59 ENCOUNTER FOR SCREENING FOR OTHER VIRAL DISEASES: ICD-10-CM

## 2021-08-13 DIAGNOSIS — R94.5 ABNORMAL RESULTS OF LIVER FUNCTION STUDIES: ICD-10-CM

## 2021-08-13 PROBLEM — I69.320 CVA, OLD, APHASIA: Status: ACTIVE | Noted: 2021-08-13

## 2021-08-13 PROBLEM — F20.9 SCHIZOPHRENIA (HCC): Status: ACTIVE | Noted: 2021-08-13

## 2021-08-13 LAB
ALBUMIN SERPL-MCNC: 4.3 G/DL (ref 3.5–5)
ALBUMIN/GLOB SERPL: 0.9 {RATIO} (ref 1.1–2.2)
ALP SERPL-CCNC: 227 U/L (ref 45–117)
ALT SERPL-CCNC: 36 U/L (ref 12–78)
ANION GAP SERPL CALC-SCNC: 3 MMOL/L (ref 5–15)
AST SERPL-CCNC: 31 U/L (ref 15–37)
BASOPHILS # BLD: 0 K/UL (ref 0–0.1)
BASOPHILS NFR BLD: 1 % (ref 0–1)
BILIRUB SERPL-MCNC: 2.2 MG/DL (ref 0.2–1)
BUN SERPL-MCNC: 18 MG/DL (ref 6–20)
BUN/CREAT SERPL: 11 (ref 12–20)
CALCIUM SERPL-MCNC: 9.9 MG/DL (ref 8.5–10.1)
CHLORIDE SERPL-SCNC: 105 MMOL/L (ref 97–108)
CO2 SERPL-SCNC: 30 MMOL/L (ref 21–32)
CREAT SERPL-MCNC: 1.62 MG/DL (ref 0.7–1.3)
DIFFERENTIAL METHOD BLD: ABNORMAL
EOSINOPHIL # BLD: 0.2 K/UL (ref 0–0.4)
EOSINOPHIL NFR BLD: 5 % (ref 0–7)
ERYTHROCYTE [DISTWIDTH] IN BLOOD BY AUTOMATED COUNT: 19.7 % (ref 11.5–14.5)
FERRITIN SERPL-MCNC: 63 NG/ML (ref 26–388)
GLOBULIN SER CALC-MCNC: 4.8 G/DL (ref 2–4)
GLUCOSE SERPL-MCNC: 72 MG/DL (ref 65–100)
HBV SURFACE AB SER QL: NONREACTIVE
HBV SURFACE AB SER-ACNC: <3.1 MIU/ML
HBV SURFACE AG SER QL: <0.1 INDEX
HBV SURFACE AG SER QL: NEGATIVE
HCT VFR BLD AUTO: 42.9 % (ref 36.6–50.3)
HGB BLD-MCNC: 13.6 G/DL (ref 12.1–17)
IMM GRANULOCYTES # BLD AUTO: 0 K/UL (ref 0–0.04)
IMM GRANULOCYTES NFR BLD AUTO: 0 % (ref 0–0.5)
INR PPP: 1.7 (ref 0.9–1.1)
IRON SATN MFR SERPL: 13 % (ref 20–50)
IRON SERPL-MCNC: 63 UG/DL (ref 35–150)
LYMPHOCYTES # BLD: 0.8 K/UL (ref 0.8–3.5)
LYMPHOCYTES NFR BLD: 23 % (ref 12–49)
MCH RBC QN AUTO: 22.7 PG (ref 26–34)
MCHC RBC AUTO-ENTMCNC: 31.7 G/DL (ref 30–36.5)
MCV RBC AUTO: 71.7 FL (ref 80–99)
MONOCYTES # BLD: 0.3 K/UL (ref 0–1)
MONOCYTES NFR BLD: 9 % (ref 5–13)
NEUTS SEG # BLD: 2.3 K/UL (ref 1.8–8)
NEUTS SEG NFR BLD: 62 % (ref 32–75)
NRBC # BLD: 0 K/UL (ref 0–0.01)
NRBC BLD-RTO: 0 PER 100 WBC
PLATELET # BLD AUTO: 121 K/UL (ref 150–400)
POTASSIUM SERPL-SCNC: 4.8 MMOL/L (ref 3.5–5.1)
PROT SERPL-MCNC: 9.1 G/DL (ref 6.4–8.2)
PROTHROMBIN TIME: 17 SEC (ref 9–11.1)
RBC # BLD AUTO: 5.98 M/UL (ref 4.1–5.7)
SODIUM SERPL-SCNC: 138 MMOL/L (ref 136–145)
TIBC SERPL-MCNC: 492 UG/DL (ref 250–450)
WBC # BLD AUTO: 3.7 K/UL (ref 4.1–11.1)

## 2021-08-13 PROCEDURE — G8754 DIAS BP LESS 90: HCPCS | Performed by: HOSPITALIST

## 2021-08-13 PROCEDURE — G8427 DOCREV CUR MEDS BY ELIG CLIN: HCPCS | Performed by: HOSPITALIST

## 2021-08-13 PROCEDURE — 3017F COLORECTAL CA SCREEN DOC REV: CPT | Performed by: HOSPITALIST

## 2021-08-13 PROCEDURE — G8752 SYS BP LESS 140: HCPCS | Performed by: HOSPITALIST

## 2021-08-13 PROCEDURE — G8419 CALC BMI OUT NRM PARAM NOF/U: HCPCS | Performed by: HOSPITALIST

## 2021-08-13 PROCEDURE — 99205 OFFICE O/P NEW HI 60 MIN: CPT | Performed by: HOSPITALIST

## 2021-08-13 PROCEDURE — G9717 DOC PT DX DEP/BP F/U NT REQ: HCPCS | Performed by: HOSPITALIST

## 2021-08-13 NOTE — PROGRESS NOTES
[facility]      Asha Nicholas MD, Lili Avila, Rebecca Rodriguez MD, MPH      Boston Higuera, PA-ELSY Quiroga, [facility]     April S Vickie, [facility]   Elio Easley, [facility]    Ghassan Paz, [facility]       Jonnathan Quiñonez [facility] 136    at [address], [address] [facility], [facility] 22.    [phone]    FAX: [address] [place] [address] Drive, [address], [address]    [phone]    FAX: [phone]     Patient Care Team:  Palak Galaviz MD as PCP - General (Family Medicine)  Kartik Fierro MD as Surgeon (General Surgery)    Problem List  Date Reviewed: 5/1/2017        Codes Class Noted    Schizophrenia [facility]) ICD-10-CM: F20.9  ICD-9-CM: 295.90  8/13/2021        Trichilemmal cyst ICD-10-CM: L72.12  ICD-9-CM: 704.42  4/10/2017        Left atrial thrombus ICD-10-CM: I51.3  ICD-9-CM: 429.89  6/25/2016        Thrombus of left atrial appendage ICD-10-CM: I51.3  ICD-9-CM: 429.89  6/25/2016        Lower abdominal pain ICD-10-CM: R10.30  ICD-9-CM: 789.09  6/25/2016        Atrial mass ICD-10-CM: I51.89  ICD-9-CM: 429.89  6/24/2016        History of CVA (cerebrovascular accident) (Chronic) ICD-10-CM: Z86.73  ICD-9-CM: V12.54  8/12/2015        Aphasia (Chronic) ICD-10-CM: R47.01  ICD-9-CM: 784.3  8/12/2015        GERD (gastroesophageal reflux disease) (Chronic) ICD-10-CM: K21.9  ICD-9-CM: 530.81  8/12/2015        Acute renal failure (ARF) ([facility] ICD-10-CM: N17.9  ICD-9-CM: 584.9  8/12/2015        IHSS (idiopathic hypertrophic subaortic stenosis) (HCC) (Chronic) ICD-10-CM: I42.1  ICD-9-CM: 425.11  7/26/2012        Atrial fibrillation ([facility] (Chronic) ICD-10-CM: I48.91  ICD-9-CM: 427.31  7/26/2012        Alcohol abuse (Chronic) ICD-10-CM: F10.10  ICD-9-CM: 305.00  7/26/2012 CAD (coronary artery disease) (Chronic) ICD-10-CM: I25.10  ICD-9-CM: 414.00  7/26/2012        Hypertension (Chronic) ICD-10-CM: I10  ICD-9-CM: 401.9  7/26/2012        Thyroid disease (Chronic) ICD-10-CM: E07.9  ICD-9-CM: 246.9  7/26/2012        High cholesterol (Chronic) ICD-10-CM: E78.00  ICD-9-CM: 272.0  7/26/2012        Depression (Chronic) ICD-10-CM: F32.9  ICD-9-CM: 086  7/26/2012            The clinicians listed above have asked me to see Bessie García in consultation regarding elevated liver enzymes and its management. All medical records sent by the referring physicians were reviewed including imaging studies   He is accompaneid by his sister- Sunday Koo    The patient is aphasic and history was obtained from sister    The patient is a 60 y.o. Black male who was found to have elevated liver enzymes in 06/2021 after he presented to the ER with the complaint of abdominal pain    Serologic evaluation for markers of chronic liver disease has either not been performed or the results are not available. Imaging of the liver was performed with US on 3/29/2021. This demonstrated that the liver is dense and slightly large. Diffuse ascites. Pulsatile portal vein flow. An assessment of liver fibrosis with biopsy, Fibroscan or elastography has not been performed. The patient had not started any new medications within 3 months preceding the elevation in liver chemistries. The patient reports abdominal swelling and lower extremity swelling. The patient is not experiencing the following symptoms which are commonly seen in this liver disorder: pain in the right side over the   yellowing of the eyes or skin, problems concentrating, swelling of the abdomen, swelling of the lower extremities,   hematemesis, hematochezia. The patient has Moderate to Severe limitations in functional activities which can be attributed to other medical problems that are not related to the liver disease.  He ambulates on a walker      ASSESSMENT AND PLAN:  Elevated liver enzymes  Will perform  laboratory testing to monitor liver function and degree of liver injury. This included BMP, hepatic panel, CBC with platelet count,       Serologic testing for causes of chronic liver disease was ordered. We will perform fibro scan of the liver on next clinic visit     Ascites  There is no clinic evidence of ascites on clinical exam  Continue lasix    Lower extremity swelling  Continue current dose of lasix    Screening for Hepatocellular Carcinoma  HCC screening is not necessary if the patient has no evidence of cirrhosis. Treatment of other medical problems in patients with chronic liver disease  There are no contraindications for the patient to take most medications that are necessary for treatment of other medical issues. Counseling for alcohol in patients with chronic liver disease  The patient was counseled regarding alcohol consumption and the effect of alcohol on chronic liver disease. The patient may have cirrhosis and was advised to be abstinent from all alcohol including non-alcoholic beer which does contain some alcohol. Vaccinations   The need for vaccination against viral hepatitis A and B will be assessed with serologic and instituted as appropriate. Routine vaccinations against other bacterial and viral agents can be performed as indicated. Annual flu vaccination should be administered if indicated. ALLERGIES  Allergies   Allergen Reactions    Bactrim [Sulfamethoxazole-Trimethoprim] Other (comments)     GI Distress       MEDICATIONS  Current Outpatient Medications   Medication Sig    levETIRAcetam (KEPPRA) 100 mg/mL solution 7.5 mL by PEG Tube route two (2) times a day.  carvediloL (COREG) 6.25 mg tablet 6.25 mg by PEG Tube route two (2) times a day.  levothyroxine (SYNTHROID) 50 mcg tablet 50 mcg by PEG Tube route Daily (before breakfast).     lisinopriL (PRINIVIL, ZESTRIL) 5 mg tablet 5 mg by PEG Tube route daily.  dicyclomine (BENTYL) 20 mg tablet 20 mg by PEG Tube route two (2) times daily as needed for Other (for IBS).  lactose-reduced food/fiber (JEVITY 1.2 JODY PO) 237 mL by PEG Tube route every four (4) hours.  HYDROcodone-acetaminophen (NORCO) 5-325 mg per tablet Take 1 Tab by mouth every four (4) hours as needed for Pain. Max Daily Amount: 6 Tabs.  furosemide (LASIX) 20 mg tablet 20 mg by PEG Tube route daily.  mupirocin (BACTROBAN) 2 % ointment Apply  to affected area three (3) times daily.  rivaroxaban (XARELTO) 20 mg tab tablet 20 mg by PEG Tube route daily.  ondansetron (ZOFRAN ODT) 4 mg disintegrating tablet Take 1 Tab by mouth every eight (8) hours as needed for Nausea. (Patient taking differently: 1 Tab by PEG Tube route two (2) times daily as needed for Nausea.)    atorvastatin (LIPITOR) 20 mg tablet 40 mg by PEG Tube route daily.  FLUoxetine (PROZAC) 20 mg capsule 80 mg by PEG Tube route daily. No current facility-administered medications for this visit. SYSTEM REVIEW NOT RELATED TO LIVER DISEASE OR REVIEWED ABOVE:  Constitution systems: Negative for fever, chills, weight gain, weight loss. Eyes: Negative for visual changes. ENT: Negative for sore throat, painful swallowing. Respiratory: Negative for cough, hemoptysis, SOB. Cardiology: Negative for chest pain, palpitations. GI:  Negative for constipation or diarrhea. : Negative for urinary frequency, dysuria, hematuria, nocturia. Skin: Negative for rash. Hematology: Negative for easy bruising, blood clots. Musculo-skelatal: Negative for back pain, muscle pain, weakness. Neurologic: Negative for headaches, dizziness, vertigo, memory problems not related to HE. Psychology: Negative for anxiety, depression. FAMILY HISTORY:  There is no family history of liver disease. There is no family history of immune disorders.     SOCIAL HISTORY:  The patient is   The patient has 3 children and 2 grandchildren   The patient stopped using tobacco products in 2020   The patient consume has previously consumed alcohol in excess. He now drinks alcohol occasionally    The patient is on disability    PHYSICAL EXAMINATION:  Visit Vitals  /82 (BP 1 Location: Left lower arm, BP Patient Position: Sitting, BP Cuff Size: Adult)   Pulse 60   Temp 96.9 °F (36.1 °C) (Temporal)   Resp 16   Ht 5' 7\" (1.702 m)   Wt 169 lb (76.7 kg)   SpO2 99%   BMI 26.47 kg/m²     General: No acute distress. Eyes: Sclera anicteric. ENT: No oral lesions. Thyroid normal.  Nodes: No adenopathy. Skin: No spider angiomata. No jaundice. No palmar erythema. Respiratory: Lungs clear to auscultation. Cardiovascular: Regular heart rate. No murmurs. No JVD. Abdomen: Soft non-tender. Liver size normal to percussion/palpation. Spleen not palpable. No obvious ascites. Extremities: 1+ edema. No muscle wasting. No gross arthritic changes. Neurologic: Alert and oriented. Cranial nerves grossly intact. No asterixis. LABORATORY STUDIES:  Recent liver function panel, CBC with platelet count and BMP are not available. These studies will be performed. Sodium 137, potassium 3.9, glucose 100, BUN 14, creatinine 1.17  CBC: WBC 3.3, hemoglobin 12.0, platelet 345    SEROLOGIES:  Not available or performed. Testing was performed today. LIVER HISTOLOGY:  Not available or performed    ENDOSCOPIC PROCEDURES:  Not available or performed    RADIOLOGY:  Not available or performed  3/29/2021: Ultrasound of the liver: The liver is dense and slightly large. Diffuse ascites. Pulsatile portal vein flow. OTHER TESTING:  Not available or performed    FOLLOW-UP:  All of the issues listed above in the Assessment and Plan were discussed with the patient. All questions were answered. The patient expressed a clear understanding of the above.     19[facility]ed with [facility] 87 in 4 weeks for fibro scan and to review all data and determine the treatment plan.     Fortunato Lemus MD, MPH  Advanced Hepatology  [facility] 13  [address] [address] [place], [address] [facility] 22.  [phone]  [address]

## 2021-08-13 NOTE — LETTER
8/13/2021    Patient: Kaylah Salvador   YOB: 1965   Date of Visit: 8/13/2021     Travis Mcgregor MD  [address] [facility]  [address] [address]  Via Fax: [phone]     Olga Huff, [address] [address]  Via Fax: [phone]    Dear MD Olga Vogel MD,      Thank you for referring Mr. Kaylah Salvador to [facility] for evaluation. My notes for this consultation are attached. If you have questions, please do not hesitate to call me. I look forward to following your patient along with you.       Sincerely,    Fadumo Brooks MD

## 2021-08-13 NOTE — PROGRESS NOTES
Identified pt with two pt identifiers(name and ). Reviewed record in preparation for visit and have obtained necessary documentation. Chief Complaint   Patient presents with    New Patient     MARYELLEN        Vitals:    21 0839   BP: 131/82   Pulse: 60   Resp: 16   Temp: 96.9 °F (36.1 °C)   TempSrc: Temporal   SpO2: 99%   Weight: 169 lb (76.7 kg)   Height: 5' 7\" (1.702 m)   PainSc:   6   PainLoc: Abdomen       Health Maintenance Review: Patient reminded of \"due or due soon\" health maintenance. I have asked the patient to contact his/her primary care provider (PCP) for follow-up on his/her health maintenance. Coordination of Care Questionnaire:  :   1) Have you been to an emergency room, urgent care, or hospitalized since your last visit? If yes, where when, and reason for visit? no       2. Have seen or consulted any other health care provider since your last visit? If yes, where when, and reason for visit? NO      Patient is accompanied by sister I have received verbal consent from [facility] to discuss any/all medical information while they are present in the room.

## 2021-08-14 LAB
HAV AB SER QL IA: NEGATIVE
HBV CORE AB SERPL QL IA: NEGATIVE

## 2021-08-15 LAB — ACTIN IGG SERPL-ACNC: 55 UNITS (ref 0–19)

## 2021-08-16 ENCOUNTER — TELEPHONE (OUTPATIENT)
Dept: HEMATOLOGY | Age: 56
End: 2021-08-16

## 2021-08-16 LAB
AFP L3 MFR SERPL: NORMAL % (ref 0–9.9)
AFP SERPL-MCNC: 2.1 NG/ML (ref 0–8)
ANA TITR SER IF: NEGATIVE {TITER}
HCV AB S/CO SERPL IA: 0.2 S/CO RATIO (ref 0–0.9)
HCV AB SERPL QL IA: NORMAL

## 2021-08-16 NOTE — TELEPHONE ENCOUNTER
----- Message from [address] [place] [address] sent at 8/13/2021 12:25 PM EDT -----  Regarding: consult note  Staff at [address] [place] [address] and rehabilitation called requesting office visit note from todays visit sent to them by fax upon completion at [phone] attn Ruddy Ascencio or Major Rey. [email or web address] Faxed over requested documents to [address] [place] [address] and rehab facility today with ATTN: Carito/Adriana.  (KF)

## 2021-08-27 ENCOUNTER — TELEPHONE (OUTPATIENT)
Dept: HEMATOLOGY | Age: 56
End: 2021-08-27

## 2021-08-27 NOTE — TELEPHONE ENCOUNTER
April Delgado called on the pt's behalf to return Bessie's call for lab results.  Best call back number: [phone]

## 2021-08-30 NOTE — TELEPHONE ENCOUNTER
Patient representative returning phone call to discuss labs and next steps. Patient's number will not accept blocked calls as nurse is working remotely. Office will try to connect patient to nurse considering if available.

## 2021-08-30 NOTE — TELEPHONE ENCOUNTER
[email or web address] with patient sister Natalie Micheller via phone. She is on patient HIPPA form. Patient lives in SNF/Rehab at this time. Reviewed labs with sister this morning and care plan will be discussed on next appt 9/2021 in office with Dr. Claudy Linares verbalize understanding. (KF)

## 2021-09-21 ENCOUNTER — OFFICE VISIT (OUTPATIENT)
Dept: HEMATOLOGY | Age: 56
End: 2021-09-21
Payer: MEDICARE

## 2021-09-21 VITALS
HEIGHT: 67 IN | SYSTOLIC BLOOD PRESSURE: 128 MMHG | OXYGEN SATURATION: 97 % | BODY MASS INDEX: 26.81 KG/M2 | WEIGHT: 170.8 LBS | HEART RATE: 60 BPM | RESPIRATION RATE: 18 BRPM | DIASTOLIC BLOOD PRESSURE: 75 MMHG | TEMPERATURE: 96.8 F

## 2021-09-21 DIAGNOSIS — K70.31 ALCOHOLIC CIRRHOSIS OF LIVER WITH ASCITES (HCC): Primary | ICD-10-CM

## 2021-09-21 PROCEDURE — 3017F COLORECTAL CA SCREEN DOC REV: CPT | Performed by: HOSPITALIST

## 2021-09-21 PROCEDURE — G8754 DIAS BP LESS 90: HCPCS | Performed by: HOSPITALIST

## 2021-09-21 PROCEDURE — G9717 DOC PT DX DEP/BP F/U NT REQ: HCPCS | Performed by: HOSPITALIST

## 2021-09-21 PROCEDURE — G8419 CALC BMI OUT NRM PARAM NOF/U: HCPCS | Performed by: HOSPITALIST

## 2021-09-21 PROCEDURE — 99214 OFFICE O/P EST MOD 30 MIN: CPT | Performed by: HOSPITALIST

## 2021-09-21 PROCEDURE — G8427 DOCREV CUR MEDS BY ELIG CLIN: HCPCS | Performed by: HOSPITALIST

## 2021-09-21 PROCEDURE — G8752 SYS BP LESS 140: HCPCS | Performed by: HOSPITALIST

## 2021-09-21 RX ORDER — ACETAMINOPHEN 500 MG
TABLET ORAL
COMMUNITY

## 2021-09-21 RX ORDER — AMOXICILLIN 250 MG
1 CAPSULE ORAL DAILY
COMMUNITY

## 2021-09-21 NOTE — PROGRESS NOTES
Identified pt with two pt identifiers(name and ). Reviewed record in preparation for visit and have obtained necessary documentation. Chief Complaint   Patient presents with    Follow-up     4 week f/u        Health Maintenance Due   Topic    Lipid Screen     COVID-19 Vaccine (1)    DTaP/Tdap/Td series (1 - Tdap)    Colorectal Cancer Screening Combo     Shingrix Vaccine Age 50> (1 of 2)    Medicare Yearly Exam     Flu Vaccine (1)        Visit Vitals  /75 (BP 1 Location: Left upper arm, BP Patient Position: Sitting, BP Cuff Size: Large adult)   Pulse 60   Temp 96.8 °F (36 °C) (Temporal)   Resp 18   Ht 5' 7\" (1.702 m)   Wt 170 lb 12.8 oz (77.5 kg)   SpO2 97%   BMI 26.75 kg/m²     Pain Scale: /10    Coordination of Care Questionnaire:  :   1. Have you been to the ER, urgent care clinic since your last visit? Hospitalized since your last visit? No    2. Have you seen or consulted any other health care providers outside of the [address] [place] [address] since your last visit? Include any pap smears or colon screening.  No

## 2021-09-21 NOTE — PROGRESS NOTES
[address] [facility]      Theresa Tate MD, Sacha Wu, Julian Figueroa MD, MPH      ISABELA Caraballo, [facility]     Amaya MARTE Vickie, [facility]   Loretta Lcay [facility]    Mariajose Lewis, [facility]       Jonnathan Quiñonez Jesus [address]    at [address], [facility], [facility] 22.    [phone]    FAX: [address] [place] [address] Avenue    [address], [address], [address]    [phone]    FAX: [phone]     Patient Care Team:  Katie Baker MD as PCP - General (Family Medicine)  Anjelica Sarmiento MD as Surgeon (General Surgery)    Problem List  Date Reviewed: 5/1/2017        Codes Class Noted    Schizophrenia [facility]) ICD-10-CM: F20.9  ICD-9-CM: 295.90  8/13/2021        CVA, old, aphasia ICD-10-CM: I69.320  ICD-9-CM: 438.11  8/13/2021        Trichilemmal cyst ICD-10-CM: L72.12  ICD-9-CM: 704.42  4/10/2017        Left atrial thrombus ICD-10-CM: I51.3  ICD-9-CM: 429.89  6/25/2016        Thrombus of left atrial appendage ICD-10-CM: I51.3  ICD-9-CM: 429.89  6/25/2016        Lower abdominal pain ICD-10-CM: R10.30  ICD-9-CM: 789.09  6/25/2016        Atrial mass ICD-10-CM: I51.89  ICD-9-CM: 429.89  6/24/2016        History of CVA (cerebrovascular accident) (Chronic) ICD-10-CM: Z86.73  ICD-9-CM: V12.54  8/12/2015        Aphasia (Chronic) ICD-10-CM: R47.01  ICD-9-CM: 784.3  8/12/2015        GERD (gastroesophageal reflux disease) (Chronic) ICD-10-CM: K21.9  ICD-9-CM: 530.81  8/12/2015        Acute renal failure (ARF) ([facility] ICD-10-CM: N17.9  ICD-9-CM: 584.9  8/12/2015        IHSS (idiopathic hypertrophic subaortic stenosis) (HCC) (Chronic) ICD-10-CM: I42.1  ICD-9-CM: 425.11  7/26/2012        Atrial fibrillation ([facility] (Chronic) ICD-10-CM: I48.91  ICD-9-CM: 427.31  7/26/2012 Alcohol abuse (Chronic) ICD-10-CM: F10.10  ICD-9-CM: 305.00  7/26/2012        CAD (coronary artery disease) (Chronic) ICD-10-CM: I25.10  ICD-9-CM: 414.00  7/26/2012        Hypertension (Chronic) ICD-10-CM: I10  ICD-9-CM: 401.9  7/26/2012        Thyroid disease (Chronic) ICD-10-CM: E07.9  ICD-9-CM: 246.9  7/26/2012        High cholesterol (Chronic) ICD-10-CM: E78.00  ICD-9-CM: 272.0  7/26/2012        Depression (Chronic) ICD-10-CM: F32.9  ICD-9-CM: 896  7/26/2012             Jeff Rider returns to the [facility] for follow up and management of elevated liver enzymes   All medical records sent by the referring physicians were reviewed including imaging studies   He is accompaneid by his sister- Nasrin Driscoll    The patient is aphasic and history was obtained from sister    The patient is a 60 y.o. Black male who was found to have elevated liver enzymes in 06/2021 after he presented to the ER with the complaint of abdominal pain    Serologic evaluation for markers of chronic liver disease has either not been performed or the results are not available. Imaging of the liver was performed with US on 3/29/2021. This demonstrated that the liver is dense and slightly large. Diffuse ascites. Pulsatile portal vein flow. Cross-sectional imaging performed with CT scan in 04/2021. This reviewed  Ascites with nodular contour of the liver. Enlarged intrahepatic IVC and hepatic veins with reflux of contrast compatible with passive congestion    An assessment of liver fibrosis with biopsy, Fibroscan or elastography has not been performed. The patient had not started any new medications within 3 months preceding the elevation in liver chemistries. The patient reports abdominal swelling and lower extremity swelling.     The patient is not experiencing the following symptoms which are commonly seen in this liver disorder: pain in the right side over the   yellowing of the eyes or skin, problems concentrating, swelling of the abdomen, swelling of the lower extremities,   hematemesis, hematochezia. He has a history of heavy alcohol use. He drank mainly beers for many years. Last alcohol intake was in 09/2021. The patient has Moderate to Severe limitations in functional activities which can be attributed to other medical problems that are not related to the liver disease. He ambulates on a walker    Since last clinic visit  Patient was last seen in the clinic in 08/2021  He has not required any hospital admission or ER visit. He is currently a resident of [facility]    He is complaining that his abdomen is bloated   Labs performed during last clinic visit exam reviewed. CT scan performed in April 2021 reviewed nodular liver    He does not have any pain over the liver, no jaundice, no lower extremity swelling, no confusion or poor concentration although patient is aphasic from prior stroke    He remains on lasix 20 mg daily but not adherent to low-salt diet      ASSESSMENT AND PLAN:  Cirrhosis  The diagnosis of cirrhosis is based on imaging, laboratory studies and complications of cirrhosis  Cirrhosis is most likely due to presumed alcohol  Serologic markers for causes of chronic liver disease is positive for anti-smooth muscle antibody    This likely suggests probably underlying autoimmune liver disease but less likely    CTP score is 7, Child Class B, MELD score is 20    Ascites  This is due to cirrhosis  Continue Lasix 20 mg daily  Recommend low-salt diet less than 2000 mg/day    Screening for esophageal varices  EGD of the liver has not been performed. This will be scheduled    Screening for Hepatocellular Carcinoma  The most recent imaging of the liver performed in April did not show any focal hepatic lesion. Repeat imaging of the liver is due in October 2021.   This will be scheduled    Treatment of other medical problems in patients with chronic liver disease  There are no contraindications for the patient to take most medications that are necessary for treatment of other medical issues. Counseling for alcohol in patients with chronic liver disease  The patient was counseled regarding alcohol consumption and the effect of alcohol on chronic liver disease. The patient may have cirrhosis and was advised to be abstinent from all alcohol including non-alcoholic beer which does contain some alcohol. He has not had any alcoholic beverages since September 2020    Vaccinations   Vaccination for viral hepatitis A and B is recommended since the patient has no serologic evidence of previous exposure or vaccination with immunity. Routine vaccinations against other bacterial and viral agents can be performed as indicated. Annual flu vaccination should be administered if indicated. ALLERGIES  Allergies   Allergen Reactions    Bactrim [Sulfamethoxazole-Trimethoprim] Other (comments)     GI Distress       MEDICATIONS  Current Outpatient Medications   Medication Sig    acetaminophen (Tylenol Extra Strength) 500 mg tablet Take  by mouth every six (6) hours as needed for Pain.  senna-docusate (Senna with Docusate Sodium) 8.6-50 mg per tablet Take 1 Tablet by mouth daily.  levETIRAcetam (KEPPRA) 100 mg/mL solution 7.5 mL by PEG Tube route two (2) times a day.  carvediloL (COREG) 6.25 mg tablet 6.25 mg by PEG Tube route two (2) times a day.  levothyroxine (SYNTHROID) 50 mcg tablet 50 mcg by PEG Tube route Daily (before breakfast).  dicyclomine (BENTYL) 20 mg tablet 20 mg by PEG Tube route two (2) times daily as needed for Other (for IBS).  rivaroxaban (XARELTO) 20 mg tab tablet 20 mg by PEG Tube route daily.  ondansetron (ZOFRAN ODT) 4 mg disintegrating tablet Take 1 Tab by mouth every eight (8) hours as needed for Nausea.  (Patient taking differently: 1 Tab by PEG Tube route two (2) times daily as needed for Nausea.)    atorvastatin (LIPITOR) 20 mg tablet 40 mg by PEG Tube route daily.  FLUoxetine (PROZAC) 20 mg capsule 80 mg by PEG Tube route daily.  lisinopriL (PRINIVIL, ZESTRIL) 5 mg tablet 5 mg by PEG Tube route daily. (Patient not taking: Reported on 9/21/2021)    lactose-reduced food/fiber (JEVITY 1.2 JODY PO) 237 mL by PEG Tube route every four (4) hours. (Patient not taking: Reported on 9/21/2021)    HYDROcodone-acetaminophen (NORCO) 5-325 mg per tablet Take 1 Tab by mouth every four (4) hours as needed for Pain. Max Daily Amount: 6 Tabs. (Patient not taking: Reported on 9/21/2021)    furosemide (LASIX) 20 mg tablet 20 mg by PEG Tube route daily. (Patient not taking: Reported on 9/21/2021)    mupirocin (BACTROBAN) 2 % ointment Apply  to affected area three (3) times daily. (Patient not taking: Reported on 9/21/2021)     No current facility-administered medications for this visit. SYSTEM REVIEW NOT RELATED TO LIVER DISEASE OR REVIEWED ABOVE:  Constitution systems: Negative for fever, chills, weight gain, weight loss. Eyes: Negative for visual changes. ENT: Negative for sore throat, painful swallowing. Respiratory: Negative for cough, hemoptysis, SOB. Cardiology: Negative for chest pain, palpitations. GI:  Negative for constipation or diarrhea. : Negative for urinary frequency, dysuria, hematuria, nocturia. Skin: Negative for rash. Hematology: Negative for easy bruising, blood clots. Musculo-skelatal: Negative for back pain, muscle pain, weakness. Neurologic: Negative for headaches, dizziness, vertigo, memory problems not related to HE. Psychology: Negative for anxiety, depression. FAMILY HISTORY:  There is no family history of liver disease. There is no family history of immune disorders. SOCIAL HISTORY:  The patient is   The patient has 3 children and 2 grandchildren   The patient stopped using tobacco products in 2020   The patient has previously consumed alcohol in excess.   He now drinks alcohol occasionally    The patient is on disability    PHYSICAL EXAMINATION:  Visit Vitals  /75 (BP 1 Location: Left upper arm, BP Patient Position: Sitting, BP Cuff Size: Large adult)   Pulse 60   Temp 96.8 °F (36 °C) (Temporal)   Resp 18   Ht 5' 7\" (1.702 m)   Wt 170 lb 12.8 oz (77.5 kg)   SpO2 97%   BMI 26.75 kg/m²     General: No acute distress. Eyes: Sclera anicteric. ENT: No oral lesions. Thyroid normal.  Nodes: No adenopathy. Skin: No spider angiomata. No jaundice. No palmar erythema. Respiratory: Lungs clear to auscultation. Cardiovascular: Regular heart rate. No murmurs. No JVD. Abdomen: Soft non-tender. Liver size normal to percussion/palpation. Spleen not palpable. No obvious ascites. Extremities: 1+ edema. No muscle wasting. No gross arthritic changes. Neurologic: Alert and oriented. Cranial nerves grossly intact. No asterixis. LABORATORY STUDIES:  Recent liver function panel, CBC with platelet count and BMP are not available. These studies will be performed. Sodium 137, potassium 3.9, glucose 100, BUN 14, creatinine 1.17  CBC: WBC 3.3, hemoglobin 12.0, platelet 485  Liver [place] of [address] [place] [address] Ref Rng & Units 8/13/2021   WBC 4.1 - 11.1 K/uL 3.7 (L)   ANC 1.8 - 8.0 K/UL 2.3   HGB 12.1 - 17.0 g/dL 13.6    - 400 K/uL 121 (L)   INR 0.9 - 1.1   1.7 (H)   AST 15 - 37 U/L 31   ALT 12 - 78 U/L 36   Alk Phos 45 - 117 U/L 227 (H)   Bili, Total 0.2 - 1.0 MG/DL 2.2 (H)   Albumin 3.5 - 5.0 g/dL 4.3   BUN 6 - 20 MG/DL 18   Creat 0.70 - 1.30 MG/DL 1.62 (H)   Creat (iSTAT) 0.6 - 1.3 mg/dL    Na 136 - 145 mmol/L 138   K 3.5 - 5.1 mmol/L 4.8   Cl 97 - 108 mmol/L 105   CO2 21 - 32 mmol/L 30   Glucose 65 - 100 mg/dL 72     SEROLOGIES:  Not available or performed. Testing was performed today.   Serologies Latest Ref Rng & Units 8/13/2021   Hep A Ab, Total Negative   Negative   Hep B Surface Ag Index <0.10   Hep B Surface Ag Interp Negative   Negative   Hep B Core Ab, Total Negative   Negative   Hep B Surface Ab mIU/mL <3.10   Hep B Surface Ab Interp NONREACTIVE   NONREACTIVE   Hep C Ab 0.0 - 0.9 s/co ratio 0.2   Ferritin 26 - 388 NG/ML 63   Iron % Saturation 20 - 50 % 13 (L)   CRISSY, IFA  Negative   ASMCA 0 - 19 Units 55 (H)     LIVER HISTOLOGY:  Not available or performed    ENDOSCOPIC PROCEDURES:  Not available or performed    RADIOLOGY:  04/2021: CT scan liver:  Ascites with nodular contour of the liver. Enlarged intrahepatic IVC and hepatic veins with reflux of contrast compatible with passive congestion    3/29/2021: Ultrasound of the liver: The liver is dense and slightly large. Diffuse ascites. Pulsatile portal vein flow. OTHER TESTING:  Not available or performed    FOLLOW-UP:  All of the issues listed above in the Assessment and Plan were discussed with the patient. All questions were answered. The patient expressed a clear understanding of the above. [address] [place] [address] in 4 weeks for fibro scan and to review all data and determine the treatment plan.     Josep French MD, MPH  Advanced Hepatology  [facility] 13 of [address] [facility] 77 [address] [address], [address] [place] [address] 22.  [phone]  [address] [place] [address]

## 2021-09-21 NOTE — LETTER
9/21/2021    Patient: Aleyda Abreu   YOB: 1965   Date of Visit: 9/21/2021     Tish Page MD  [address]  [address] [address]  Via Fax: [phone]    Dear Tish Page MD,      Thank you for referring Mr. Aleyda Abreu to [address] [facility] Lesli Peres for evaluation. My notes for this consultation are attached. If you have questions, please do not hesitate to call me. I look forward to following your patient along with you.       Sincerely,    Nas Marie MD

## 2021-09-21 NOTE — PATIENT INSTRUCTIONS
You most likely have cirrhosis    You have ascites but this is mild.      Continue lasix 20 mg daily    Recommend low salt diet < 2000 mg per day    We will schedule you for EGD to rule out esophageal varices

## 2022-01-12 ENCOUNTER — TELEPHONE (OUTPATIENT)
Dept: HEMATOLOGY | Age: 57
End: 2022-01-12

## 2022-01-12 NOTE — TELEPHONE ENCOUNTER
Notified patient that Dr. Emmy Harding is no longer with practice. Patient's appointment with provider will be cancelled. Patient will be contacted at a later date to reschedule. Patient understood information.

## 2022-01-18 ENCOUNTER — HOSPITAL ENCOUNTER (OUTPATIENT)
Dept: ULTRASOUND IMAGING | Age: 57
Discharge: HOME | End: 2022-01-18
Attending: HOSPITALIST
Payer: MEDICARE

## 2022-01-18 DIAGNOSIS — K70.31 ALCOHOLIC CIRRHOSIS OF LIVER WITH ASCITES (HCC): ICD-10-CM

## 2022-01-18 PROCEDURE — 76700 US EXAM ABDOM COMPLETE: CPT

## 2022-03-14 ENCOUNTER — TELEPHONE (OUTPATIENT)
Dept: HEMATOLOGY | Age: 57
End: 2022-03-14

## 2022-03-14 NOTE — TELEPHONE ENCOUNTER
[email or web address] Spoke w/Adriana from patient SNF. Patient had been complaining of abdominal pain facility ordered [address] but they will need to reschedule related to problems getting someone to come out to the facility. Advise, Dickson Yoo that if US can be done prior to appt on 3/24/22 then at that time MLS will review and discuss finding. Dickson Yoo verbalize understanding.  (KF)

## 2022-03-14 NOTE — TELEPHONE ENCOUNTER
Patient's nurse is calling with a few questions prior to his appointment. Please give her a call back when you can.  Thanks! -Chelsie

## 2022-03-18 PROBLEM — F20.9 SCHIZOPHRENIA (HCC): Status: ACTIVE | Noted: 2021-08-13

## 2022-03-19 PROBLEM — I69.320 CVA, OLD, APHASIA: Status: ACTIVE | Noted: 2021-08-13

## 2022-03-20 PROBLEM — L72.12 TRICHILEMMAL CYST: Status: ACTIVE | Noted: 2017-04-10

## 2022-04-29 ENCOUNTER — TELEPHONE (OUTPATIENT)
Dept: HEMATOLOGY | Age: 57
End: 2022-04-29

## 2022-04-29 NOTE — TELEPHONE ENCOUNTER
Patient called to request a nurse to call back and review results from last appointment. Please give them a call back when you can.  Thanks! -Chelsie

## 2022-04-29 NOTE — TELEPHONE ENCOUNTER
[email or web address] Spoke w/patient sister Roya Armendariz concerning last US done 1/2022 with . Patient has been in Rehab facility and missed last appt. Sister request US be mailed to her so she can follow up with his heart doctor. Mailed US report.  (KF)

## 2022-06-29 ENCOUNTER — TRANSCRIBE ORDER (OUTPATIENT)
Dept: SCHEDULING | Age: 57
End: 2022-06-29

## 2022-06-29 DIAGNOSIS — M62.81 MUSCLE WEAKNESS (GENERALIZED): ICD-10-CM

## 2022-06-29 DIAGNOSIS — M25.551 RIGHT HIP PAIN: Primary | ICD-10-CM

## 2022-08-22 ENCOUNTER — TELEPHONE (OUTPATIENT)
Dept: HEMATOLOGY | Age: 57
End: 2022-08-22

## 2022-08-22 NOTE — TELEPHONE ENCOUNTER
Ms Caridad Santos called to see if patient showed for 8/19/22, advised appt was missed. She advised to call facility at [phone], advised we have that # listed.   Advised I will call to rs    Called facility was transferred to Ms Arreola's  and LM to cb to  appt

## 2022-09-02 ENCOUNTER — APPOINTMENT (OUTPATIENT)
Dept: GENERAL RADIOLOGY | Age: 57
DRG: 871 | End: 2022-09-02
Attending: EMERGENCY MEDICINE
Payer: MEDICARE

## 2022-09-02 ENCOUNTER — HOSPITAL ENCOUNTER (INPATIENT)
Age: 57
LOS: 6 days | Discharge: NURSING, REHAB OR LONG-TERM CARE | DRG: 871 | End: 2022-09-09
Attending: EMERGENCY MEDICINE | Admitting: STUDENT IN AN ORGANIZED HEALTH CARE EDUCATION/TRAINING PROGRAM
Payer: MEDICARE

## 2022-09-02 ENCOUNTER — APPOINTMENT (OUTPATIENT)
Dept: CT IMAGING | Age: 57
DRG: 871 | End: 2022-09-02
Attending: EMERGENCY MEDICINE
Payer: MEDICARE

## 2022-09-02 DIAGNOSIS — R47.01 APHASIA: Chronic | ICD-10-CM

## 2022-09-02 DIAGNOSIS — G93.40 ENCEPHALOPATHY: ICD-10-CM

## 2022-09-02 DIAGNOSIS — R56.9 SEIZURE (HCC): Primary | ICD-10-CM

## 2022-09-02 DIAGNOSIS — I48.11 LONGSTANDING PERSISTENT ATRIAL FIBRILLATION (HCC): ICD-10-CM

## 2022-09-02 DIAGNOSIS — K76.82 HEPATIC ENCEPHALOPATHY: ICD-10-CM

## 2022-09-02 LAB
ALBUMIN SERPL-MCNC: 4.3 G/DL (ref 3.5–5)
ALBUMIN/GLOB SERPL: 0.8 {RATIO} (ref 1.1–2.2)
ALP SERPL-CCNC: 262 U/L (ref 45–117)
ALT SERPL-CCNC: 36 U/L (ref 12–78)
AMMONIA PLAS-SCNC: 113 UMOL/L
ANION GAP SERPL CALC-SCNC: 11 MMOL/L (ref 5–15)
APAP SERPL-MCNC: <2 UG/ML (ref 10–30)
AST SERPL-CCNC: 35 U/L (ref 15–37)
ATRIAL RATE: 263 BPM
BASE DEFICIT BLD-SCNC: 9.2 MMOL/L
BASOPHILS # BLD: 0 K/UL (ref 0–0.1)
BASOPHILS NFR BLD: 0 % (ref 0–1)
BILIRUB SERPL-MCNC: 3.5 MG/DL (ref 0.2–1)
BNP SERPL-MCNC: 2709 PG/ML
BUN SERPL-MCNC: 15 MG/DL (ref 6–20)
BUN/CREAT SERPL: 7 (ref 12–20)
CA-I BLD-MCNC: 1.23 MMOL/L (ref 1.12–1.32)
CALCIUM SERPL-MCNC: 10.1 MG/DL (ref 8.5–10.1)
CALCULATED R AXIS, ECG10: -70 DEGREES
CALCULATED T AXIS, ECG11: 98 DEGREES
CHLORIDE BLD-SCNC: 106 MMOL/L (ref 100–108)
CHLORIDE SERPL-SCNC: 101 MMOL/L (ref 97–108)
CO2 BLD-SCNC: 26 MMOL/L (ref 19–24)
CO2 SERPL-SCNC: 22 MMOL/L (ref 21–32)
COMMENT, HOLDF: NORMAL
COVID-19 RAPID TEST, COVR: NOT DETECTED
CREAT SERPL-MCNC: 2.29 MG/DL (ref 0.7–1.3)
CREAT UR-MCNC: 2.2 MG/DL (ref 0.6–1.3)
DIAGNOSIS, 93000: NORMAL
DIFFERENTIAL METHOD BLD: ABNORMAL
EOSINOPHIL # BLD: 0 K/UL (ref 0–0.4)
EOSINOPHIL NFR BLD: 0 % (ref 0–7)
ERYTHROCYTE [DISTWIDTH] IN BLOOD BY AUTOMATED COUNT: 18.2 % (ref 11.5–14.5)
ETHANOL SERPL-MCNC: <10 MG/DL
GLOBULIN SER CALC-MCNC: 5.1 G/DL (ref 2–4)
GLUCOSE BLD STRIP.AUTO-MCNC: 103 MG/DL (ref 65–117)
GLUCOSE BLD STRIP.AUTO-MCNC: 130 MG/DL (ref 74–106)
GLUCOSE SERPL-MCNC: 111 MG/DL (ref 65–100)
HCO3 BLDA-SCNC: 25 MMOL/L
HCT VFR BLD AUTO: 49.3 % (ref 36.6–50.3)
HGB BLD-MCNC: 14.7 G/DL (ref 12.1–17)
IMM GRANULOCYTES # BLD AUTO: 0.1 K/UL (ref 0–0.04)
IMM GRANULOCYTES NFR BLD AUTO: 1 % (ref 0–0.5)
LACTATE BLD-SCNC: 7.69 MMOL/L (ref 0.4–2)
LYMPHOCYTES # BLD: 0.4 K/UL (ref 0.8–3.5)
LYMPHOCYTES NFR BLD: 3 % (ref 12–49)
MCH RBC QN AUTO: 23.3 PG (ref 26–34)
MCHC RBC AUTO-ENTMCNC: 29.8 G/DL (ref 30–36.5)
MCV RBC AUTO: 78.1 FL (ref 80–99)
MONOCYTES # BLD: 0.7 K/UL (ref 0–1)
MONOCYTES NFR BLD: 5 % (ref 5–13)
NEUTS SEG # BLD: 13.2 K/UL (ref 1.8–8)
NEUTS SEG NFR BLD: 91 % (ref 32–75)
NRBC # BLD: 0 K/UL (ref 0–0.01)
NRBC BLD-RTO: 0 PER 100 WBC
PCO2 BLDV: 89.5 MMHG (ref 41–51)
PH BLDV: 7.05 [PH] (ref 7.32–7.42)
PLATELET # BLD AUTO: 114 K/UL (ref 150–400)
PMV BLD AUTO: ABNORMAL FL (ref 8.9–12.9)
PO2 BLDV: 37 MMHG (ref 25–40)
POTASSIUM BLD-SCNC: 5.6 MMOL/L (ref 3.5–5.5)
POTASSIUM SERPL-SCNC: 5.4 MMOL/L (ref 3.5–5.1)
PROT SERPL-MCNC: 9.4 G/DL (ref 6.4–8.2)
Q-T INTERVAL, ECG07: 456 MS
QRS DURATION, ECG06: 166 MS
QTC CALCULATION (BEZET), ECG08: 492 MS
RBC # BLD AUTO: 6.31 M/UL (ref 4.1–5.7)
RBC MORPH BLD: ABNORMAL
RBC MORPH BLD: ABNORMAL
SALICYLATES SERPL-MCNC: <1.7 MG/DL (ref 2.8–20)
SAMPLES BEING HELD,HOLD: NORMAL
SERVICE CMNT-IMP: ABNORMAL
SERVICE CMNT-IMP: NORMAL
SODIUM BLD-SCNC: 139 MMOL/L (ref 136–145)
SODIUM SERPL-SCNC: 134 MMOL/L (ref 136–145)
SOURCE, COVRS: NORMAL
SPECIMEN SITE: ABNORMAL
TROPONIN-HIGH SENSITIVITY: 176 NG/L (ref 0–76)
VENTRICULAR RATE, ECG03: 70 BPM
WBC # BLD AUTO: 14.4 K/UL (ref 4.1–11.1)

## 2022-09-02 PROCEDURE — 74011250636 HC RX REV CODE- 250/636: Performed by: EMERGENCY MEDICINE

## 2022-09-02 PROCEDURE — 85025 COMPLETE CBC W/AUTO DIFF WBC: CPT

## 2022-09-02 PROCEDURE — 82140 ASSAY OF AMMONIA: CPT

## 2022-09-02 PROCEDURE — 83880 ASSAY OF NATRIURETIC PEPTIDE: CPT

## 2022-09-02 PROCEDURE — 82077 ASSAY SPEC XCP UR&BREATH IA: CPT

## 2022-09-02 PROCEDURE — 70450 CT HEAD/BRAIN W/O DYE: CPT

## 2022-09-02 PROCEDURE — 82962 GLUCOSE BLOOD TEST: CPT

## 2022-09-02 PROCEDURE — 71045 X-RAY EXAM CHEST 1 VIEW: CPT

## 2022-09-02 PROCEDURE — 87635 SARS-COV-2 COVID-19 AMP PRB: CPT

## 2022-09-02 PROCEDURE — 84484 ASSAY OF TROPONIN QUANT: CPT

## 2022-09-02 PROCEDURE — 82550 ASSAY OF CK (CPK): CPT

## 2022-09-02 PROCEDURE — 93005 ELECTROCARDIOGRAM TRACING: CPT

## 2022-09-02 PROCEDURE — 80179 DRUG ASSAY SALICYLATE: CPT

## 2022-09-02 PROCEDURE — 36415 COLL VENOUS BLD VENIPUNCTURE: CPT

## 2022-09-02 PROCEDURE — 80143 DRUG ASSAY ACETAMINOPHEN: CPT

## 2022-09-02 PROCEDURE — 80053 COMPREHEN METABOLIC PANEL: CPT

## 2022-09-02 PROCEDURE — 96374 THER/PROPH/DIAG INJ IV PUSH: CPT

## 2022-09-02 PROCEDURE — 82947 ASSAY GLUCOSE BLOOD QUANT: CPT

## 2022-09-02 PROCEDURE — 99285 EMERGENCY DEPT VISIT HI MDM: CPT

## 2022-09-02 RX ORDER — LEVETIRACETAM 500 MG/5ML
1000 INJECTION, SOLUTION, CONCENTRATE INTRAVENOUS
Status: COMPLETED | OUTPATIENT
Start: 2022-09-02 | End: 2022-09-02

## 2022-09-02 RX ADMIN — LEVETIRACETAM 1000 MG: 100 INJECTION INTRAVENOUS at 21:56

## 2022-09-03 ENCOUNTER — APPOINTMENT (OUTPATIENT)
Dept: CT IMAGING | Age: 57
DRG: 871 | End: 2022-09-03
Attending: STUDENT IN AN ORGANIZED HEALTH CARE EDUCATION/TRAINING PROGRAM
Payer: MEDICARE

## 2022-09-03 PROBLEM — R56.9 SEIZURE (HCC): Status: ACTIVE | Noted: 2022-09-03

## 2022-09-03 LAB
ALBUMIN SERPL-MCNC: 4.1 G/DL (ref 3.5–5)
ALBUMIN/GLOB SERPL: 0.9 {RATIO} (ref 1.1–2.2)
ALP SERPL-CCNC: 248 U/L (ref 45–117)
ALT SERPL-CCNC: 63 U/L (ref 12–78)
AMORPH CRY URNS QL MICRO: ABNORMAL
ANION GAP SERPL CALC-SCNC: 13 MMOL/L (ref 5–15)
APPEARANCE UR: ABNORMAL
ARTERIAL PATENCY WRIST A: ABNORMAL
AST SERPL-CCNC: 101 U/L (ref 15–37)
ATRIAL RATE: 220 BPM
ATRIAL RATE: 43 BPM
BACTERIA URNS QL MICRO: ABNORMAL /HPF
BASE DEFICIT BLDV-SCNC: 7.3 MMOL/L
BASOPHILS # BLD: 0 K/UL (ref 0–0.1)
BASOPHILS NFR BLD: 0 % (ref 0–1)
BDY SITE: ABNORMAL
BILIRUB SERPL-MCNC: 3.7 MG/DL (ref 0.2–1)
BILIRUB UR QL CFM: NEGATIVE
BUN SERPL-MCNC: 16 MG/DL (ref 6–20)
BUN/CREAT SERPL: 7 (ref 12–20)
CALCIUM SERPL-MCNC: 10.5 MG/DL (ref 8.5–10.1)
CALCULATED R AXIS, ECG10: -70 DEGREES
CALCULATED R AXIS, ECG10: 111 DEGREES
CALCULATED T AXIS, ECG11: -70 DEGREES
CALCULATED T AXIS, ECG11: 96 DEGREES
CHLORIDE SERPL-SCNC: 105 MMOL/L (ref 97–108)
CK SERPL-CCNC: 262 U/L (ref 39–308)
CK SERPL-CCNC: 306 U/L (ref 39–308)
CO2 SERPL-SCNC: 20 MMOL/L (ref 21–32)
COLOR UR: ABNORMAL
CREAT SERPL-MCNC: 2.26 MG/DL (ref 0.7–1.3)
DIAGNOSIS, 93000: NORMAL
DIAGNOSIS, 93000: NORMAL
DIFFERENTIAL METHOD BLD: ABNORMAL
EOSINOPHIL # BLD: 0 K/UL (ref 0–0.4)
EOSINOPHIL NFR BLD: 0 % (ref 0–7)
EPITH CASTS URNS QL MICRO: ABNORMAL /LPF
ERYTHROCYTE [DISTWIDTH] IN BLOOD BY AUTOMATED COUNT: 18.3 % (ref 11.5–14.5)
GLOBULIN SER CALC-MCNC: 4.7 G/DL (ref 2–4)
GLUCOSE BLD STRIP.AUTO-MCNC: 103 MG/DL (ref 65–117)
GLUCOSE BLD STRIP.AUTO-MCNC: 128 MG/DL (ref 65–117)
GLUCOSE BLD STRIP.AUTO-MCNC: 130 MG/DL (ref 65–117)
GLUCOSE BLD STRIP.AUTO-MCNC: 134 MG/DL (ref 65–117)
GLUCOSE BLD STRIP.AUTO-MCNC: 138 MG/DL (ref 65–117)
GLUCOSE BLD STRIP.AUTO-MCNC: 149 MG/DL (ref 65–117)
GLUCOSE BLD STRIP.AUTO-MCNC: 169 MG/DL (ref 65–117)
GLUCOSE BLD STRIP.AUTO-MCNC: 198 MG/DL (ref 65–117)
GLUCOSE BLD STRIP.AUTO-MCNC: 23 MG/DL (ref 65–117)
GLUCOSE BLD STRIP.AUTO-MCNC: 27 MG/DL (ref 65–117)
GLUCOSE BLD STRIP.AUTO-MCNC: 52 MG/DL (ref 65–117)
GLUCOSE BLD STRIP.AUTO-MCNC: 97 MG/DL (ref 65–117)
GLUCOSE SERPL-MCNC: 41 MG/DL (ref 65–100)
GLUCOSE UR STRIP.AUTO-MCNC: 100 MG/DL
GRAN CASTS URNS QL MICRO: ABNORMAL /LPF
HCO3 BLDV-SCNC: 20.2 MMOL/L (ref 23–28)
HCT VFR BLD AUTO: 49.8 % (ref 36.6–50.3)
HGB BLD-MCNC: 15.2 G/DL (ref 12.1–17)
HGB UR QL STRIP: ABNORMAL
IMM GRANULOCYTES # BLD AUTO: 0.1 K/UL (ref 0–0.04)
IMM GRANULOCYTES NFR BLD AUTO: 1 % (ref 0–0.5)
KETONES UR QL STRIP.AUTO: NEGATIVE MG/DL
LACTATE SERPL-SCNC: 3.6 MMOL/L (ref 0.4–2)
LACTATE SERPL-SCNC: 4.7 MMOL/L (ref 0.4–2)
LACTATE SERPL-SCNC: 6.4 MMOL/L (ref 0.4–2)
LACTATE SERPL-SCNC: 7 MMOL/L (ref 0.4–2)
LEUKOCYTE ESTERASE UR QL STRIP.AUTO: ABNORMAL
LYMPHOCYTES # BLD: 0.6 K/UL (ref 0.8–3.5)
LYMPHOCYTES NFR BLD: 3 % (ref 12–49)
MCH RBC QN AUTO: 23.7 PG (ref 26–34)
MCHC RBC AUTO-ENTMCNC: 30.5 G/DL (ref 30–36.5)
MCV RBC AUTO: 77.6 FL (ref 80–99)
MONOCYTES # BLD: 1.9 K/UL (ref 0–1)
MONOCYTES NFR BLD: 11 % (ref 5–13)
MUCOUS THREADS URNS QL MICRO: ABNORMAL /LPF
NEUTS SEG # BLD: 15.1 K/UL (ref 1.8–8)
NEUTS SEG NFR BLD: 85 % (ref 32–75)
NITRITE UR QL STRIP.AUTO: NEGATIVE
NRBC # BLD: 0 K/UL (ref 0–0.01)
NRBC BLD-RTO: 0 PER 100 WBC
PCO2 BLDV: 46.2 MMHG (ref 41–51)
PH BLDV: 7.25 [PH] (ref 7.32–7.42)
PH UR STRIP: 5.5 [PH] (ref 5–8)
PLATELET # BLD AUTO: 114 K/UL (ref 150–400)
PMV BLD AUTO: ABNORMAL FL (ref 8.9–12.9)
PO2 BLDV: 33 MMHG (ref 25–40)
POTASSIUM SERPL-SCNC: 5 MMOL/L (ref 3.5–5.1)
PROCALCITONIN SERPL-MCNC: 18.95 NG/ML
PROT SERPL-MCNC: 8.8 G/DL (ref 6.4–8.2)
PROT UR STRIP-MCNC: 100 MG/DL
Q-T INTERVAL, ECG07: 432 MS
Q-T INTERVAL, ECG07: 462 MS
QRS DURATION, ECG06: 154 MS
QRS DURATION, ECG06: 172 MS
QTC CALCULATION (BEZET), ECG08: 502 MS
QTC CALCULATION (BEZET), ECG08: 510 MS
RBC # BLD AUTO: 6.42 M/UL (ref 4.1–5.7)
RBC #/AREA URNS HPF: ABNORMAL /HPF (ref 0–5)
SAO2 % BLDV: 53.4 % (ref 65–88)
SERVICE CMNT-IMP: ABNORMAL
SERVICE CMNT-IMP: NORMAL
SERVICE CMNT-IMP: NORMAL
SODIUM SERPL-SCNC: 138 MMOL/L (ref 136–145)
SP GR UR REFRACTOMETRY: 1.02
SPECIMEN TYPE: ABNORMAL
TROPONIN-HIGH SENSITIVITY: 238 NG/L (ref 0–76)
TROPONIN-HIGH SENSITIVITY: 287 NG/L (ref 0–76)
TROPONIN-HIGH SENSITIVITY: 299 NG/L (ref 0–76)
UA: UC IF INDICATED,UAUC: ABNORMAL
UROBILINOGEN UR QL STRIP.AUTO: 1 EU/DL (ref 0.2–1)
VENTRICULAR RATE, ECG03: 71 BPM
VENTRICULAR RATE, ECG03: 84 BPM
WBC # BLD AUTO: 17.8 K/UL (ref 4.1–11.1)
WBC URNS QL MICRO: ABNORMAL /HPF (ref 0–4)

## 2022-09-03 PROCEDURE — 82962 GLUCOSE BLOOD TEST: CPT

## 2022-09-03 PROCEDURE — 85025 COMPLETE CBC W/AUTO DIFF WBC: CPT

## 2022-09-03 PROCEDURE — 36415 COLL VENOUS BLD VENIPUNCTURE: CPT

## 2022-09-03 PROCEDURE — 74011000258 HC RX REV CODE- 258: Performed by: INTERNAL MEDICINE

## 2022-09-03 PROCEDURE — 95816 EEG AWAKE AND DROWSY: CPT | Performed by: PSYCHIATRY & NEUROLOGY

## 2022-09-03 PROCEDURE — 74011250637 HC RX REV CODE- 250/637: Performed by: STUDENT IN AN ORGANIZED HEALTH CARE EDUCATION/TRAINING PROGRAM

## 2022-09-03 PROCEDURE — 80053 COMPREHEN METABOLIC PANEL: CPT

## 2022-09-03 PROCEDURE — 84145 PROCALCITONIN (PCT): CPT

## 2022-09-03 PROCEDURE — 82803 BLOOD GASES ANY COMBINATION: CPT

## 2022-09-03 PROCEDURE — 83605 ASSAY OF LACTIC ACID: CPT

## 2022-09-03 PROCEDURE — 84484 ASSAY OF TROPONIN QUANT: CPT

## 2022-09-03 PROCEDURE — 74011250636 HC RX REV CODE- 250/636: Performed by: STUDENT IN AN ORGANIZED HEALTH CARE EDUCATION/TRAINING PROGRAM

## 2022-09-03 PROCEDURE — 99223 1ST HOSP IP/OBS HIGH 75: CPT | Performed by: PSYCHIATRY & NEUROLOGY

## 2022-09-03 PROCEDURE — 74176 CT ABD & PELVIS W/O CONTRAST: CPT

## 2022-09-03 PROCEDURE — 87040 BLOOD CULTURE FOR BACTERIA: CPT

## 2022-09-03 PROCEDURE — 74011000250 HC RX REV CODE- 250: Performed by: STUDENT IN AN ORGANIZED HEALTH CARE EDUCATION/TRAINING PROGRAM

## 2022-09-03 PROCEDURE — 74011000258 HC RX REV CODE- 258: Performed by: STUDENT IN AN ORGANIZED HEALTH CARE EDUCATION/TRAINING PROGRAM

## 2022-09-03 PROCEDURE — 87086 URINE CULTURE/COLONY COUNT: CPT

## 2022-09-03 PROCEDURE — 65270000046 HC RM TELEMETRY

## 2022-09-03 PROCEDURE — 81001 URINALYSIS AUTO W/SCOPE: CPT

## 2022-09-03 RX ORDER — SODIUM CHLORIDE 0.9 % (FLUSH) 0.9 %
5-40 SYRINGE (ML) INJECTION AS NEEDED
Status: DISCONTINUED | OUTPATIENT
Start: 2022-09-03 | End: 2022-09-09 | Stop reason: HOSPADM

## 2022-09-03 RX ORDER — POLYETHYLENE GLYCOL 3350 17 G/17G
17 POWDER, FOR SOLUTION ORAL DAILY PRN
Status: DISCONTINUED | OUTPATIENT
Start: 2022-09-03 | End: 2022-09-09 | Stop reason: HOSPADM

## 2022-09-03 RX ORDER — ACETAMINOPHEN 650 MG/1
650 SUPPOSITORY RECTAL
Status: DISCONTINUED | OUTPATIENT
Start: 2022-09-03 | End: 2022-09-09 | Stop reason: HOSPADM

## 2022-09-03 RX ORDER — DEXTROSE MONOHYDRATE AND SODIUM CHLORIDE 5; .9 G/100ML; G/100ML
75 INJECTION, SOLUTION INTRAVENOUS CONTINUOUS
Status: DISCONTINUED | OUTPATIENT
Start: 2022-09-03 | End: 2022-09-06

## 2022-09-03 RX ORDER — ACETAMINOPHEN 325 MG/1
650 TABLET ORAL
Status: DISCONTINUED | OUTPATIENT
Start: 2022-09-03 | End: 2022-09-09 | Stop reason: HOSPADM

## 2022-09-03 RX ORDER — LACTULOSE 10 G/15ML
200 SOLUTION ORAL; RECTAL EVERY 6 HOURS
Status: DISCONTINUED | OUTPATIENT
Start: 2022-09-03 | End: 2022-09-05

## 2022-09-03 RX ORDER — VANCOMYCIN 1.75 GRAM/500 ML IN 0.9 % SODIUM CHLORIDE INTRAVENOUS
1750 ONCE
Status: COMPLETED | OUTPATIENT
Start: 2022-09-03 | End: 2022-09-03

## 2022-09-03 RX ORDER — DEXTROSE MONOHYDRATE 100 MG/ML
INJECTION, SOLUTION INTRAVENOUS
Status: DISCONTINUED
Start: 2022-09-03 | End: 2022-09-03

## 2022-09-03 RX ORDER — SODIUM CHLORIDE 0.9 % (FLUSH) 0.9 %
5-40 SYRINGE (ML) INJECTION EVERY 8 HOURS
Status: DISCONTINUED | OUTPATIENT
Start: 2022-09-03 | End: 2022-09-09 | Stop reason: HOSPADM

## 2022-09-03 RX ORDER — LEVETIRACETAM 500 MG/5ML
1000 INJECTION, SOLUTION, CONCENTRATE INTRAVENOUS EVERY 12 HOURS
Status: DISCONTINUED | OUTPATIENT
Start: 2022-09-03 | End: 2022-09-07

## 2022-09-03 RX ORDER — ONDANSETRON 2 MG/ML
4 INJECTION INTRAMUSCULAR; INTRAVENOUS
Status: DISCONTINUED | OUTPATIENT
Start: 2022-09-03 | End: 2022-09-09 | Stop reason: HOSPADM

## 2022-09-03 RX ORDER — ENOXAPARIN SODIUM 100 MG/ML
1 INJECTION SUBCUTANEOUS EVERY 12 HOURS
Status: DISCONTINUED | OUTPATIENT
Start: 2022-09-04 | End: 2022-09-05

## 2022-09-03 RX ORDER — DEXTROSE MONOHYDRATE 100 MG/ML
0-250 INJECTION, SOLUTION INTRAVENOUS AS NEEDED
Status: DISCONTINUED | OUTPATIENT
Start: 2022-09-03 | End: 2022-09-09 | Stop reason: HOSPADM

## 2022-09-03 RX ORDER — ONDANSETRON 4 MG/1
4 TABLET, ORALLY DISINTEGRATING ORAL
Status: DISCONTINUED | OUTPATIENT
Start: 2022-09-03 | End: 2022-09-09 | Stop reason: HOSPADM

## 2022-09-03 RX ORDER — SODIUM CHLORIDE 9 MG/ML
100 INJECTION, SOLUTION INTRAVENOUS CONTINUOUS
Status: DISCONTINUED | OUTPATIENT
Start: 2022-09-03 | End: 2022-09-03

## 2022-09-03 RX ADMIN — SODIUM CHLORIDE 100 ML/HR: 9 INJECTION, SOLUTION INTRAVENOUS at 01:27

## 2022-09-03 RX ADMIN — SODIUM CHLORIDE, PRESERVATIVE FREE 10 ML: 5 INJECTION INTRAVENOUS at 21:22

## 2022-09-03 RX ADMIN — LEVETIRACETAM 1000 MG: 100 INJECTION INTRAVENOUS at 21:22

## 2022-09-03 RX ADMIN — LACTULOSE 300 ML: 10 SOLUTION ORAL at 06:56

## 2022-09-03 RX ADMIN — VANCOMYCIN HYDROCHLORIDE 1750 MG: 10 INJECTION, POWDER, LYOPHILIZED, FOR SOLUTION INTRAVENOUS at 10:06

## 2022-09-03 RX ADMIN — LEVETIRACETAM 1000 MG: 100 INJECTION INTRAVENOUS at 09:49

## 2022-09-03 RX ADMIN — DEXTROSE AND SODIUM CHLORIDE 75 ML/HR: 5; 900 INJECTION, SOLUTION INTRAVENOUS at 06:25

## 2022-09-03 RX ADMIN — DEXTROSE AND SODIUM CHLORIDE 125 ML/HR: 5; 900 INJECTION, SOLUTION INTRAVENOUS at 19:55

## 2022-09-03 RX ADMIN — SODIUM CHLORIDE 1 G: 900 INJECTION INTRAVENOUS at 06:34

## 2022-09-03 RX ADMIN — SODIUM CHLORIDE, PRESERVATIVE FREE 10 ML: 5 INJECTION INTRAVENOUS at 13:33

## 2022-09-03 RX ADMIN — DEXTROSE MONOHYDRATE 250 ML: 100 INJECTION, SOLUTION INTRAVENOUS at 06:24

## 2022-09-03 RX ADMIN — LACTULOSE 300 ML: 10 SOLUTION ORAL at 17:31

## 2022-09-03 RX ADMIN — SODIUM CHLORIDE, PRESERVATIVE FREE 10 ML: 5 INJECTION INTRAVENOUS at 01:28

## 2022-09-03 RX ADMIN — LACTULOSE 300 ML: 10 SOLUTION ORAL at 12:26

## 2022-09-03 RX ADMIN — SODIUM CHLORIDE, PRESERVATIVE FREE 5 ML: 5 INJECTION INTRAVENOUS at 06:30

## 2022-09-03 NOTE — PROGRESS NOTES
0300: Patient is received from ED, post seizure episode, easily aroused but lethargic, unlabored breathing on room air at this time, vitals stable, no visual s/s of pain noted, skin c/d/I; maintenance IVF NS@ 100 ml/hr, assessment completed and charted, seizure precautions in progress. 0600: Labs resulted w/ critical results and Attend MD is aware, orders are placed and followed up, BS low 20s repeated and D10 bolus of 250ml infusing. 0645: BS 52 and D10  bolus completed and D5NS infusing @ 75ml/hr, will repeat BS until BS >90, patient denies any pain at this time.

## 2022-09-03 NOTE — CONSULTS
Neurology Note    Patient ID:  Shawn Reid  [id number]  56 y.o.  1965      Date of Consultation:  September 3, 2022    Referring Physician: Dr. Rian Cm    Reason for Consultation:  seizure      Assessment and Plan:    The patient is a 68-year-old gentleman with multiple medical conditions with history of prior stroke and aphasia who was admitted to the hospital with seizure activity. Also found to have electrolyte abnormalities and elevated troponin. Seizures:  Upon review of medical records and connect care everywhere, it appears that he was on a seizure medicine in the past.  Given his head CT and prior history of large cortical stroke, he does have a high risk of seizures. He should be continued on Keppra 1000 mg twice a day. I will obtain a EEG. His clinical examination currently is not suggestive of nonconvulsive status epilepticus. Reason for seizure is possibly many: Electrolyte abnormality, subtherapeutic dosing of medication, possible new cerebrovascular event, infectious etiology. Head CT with no acute abnormality. It is unclear if the patient is able to obtain an MRI. Depending on additional information found in regards to his medical history and baseline neurological status, may need to repeat the head CT in 48 hours or obtain a brain MRI if he is able. Correct underlying electrolyte abnormalities, patient with persistent bouts of hypoglycemia with glucose in the 20s this morning. Patient is on antibiotics for possible infectious etiology      Prior history of stroke:  He has been continued on anticoagulation due to history of atrial fibrillation. Neurology will continue to follow along closely. Subjective:  non verbal     History of Present Illness:   Shawn Reid is a 60 y.o. male with a history of atrial fibrillation, prior stroke and aphasia at baseline, congestive heart failure, liver disease who presented to the emergency department due to altered mental status. EMS was called as the patient was having a seizure. He was given 4 mg of Versed which ultimately stopped seizure activity. He was noted to be hypoglycemic. He does live at the [address] [place] [address]. In the emergency department he was initially found to be notably hypertensive. He was loaded with Keppra in the emergency department    There is very little in the way of medical history that has been obtained other than what is noted in the medical records. Discussed with nursing this morning. The patient did have recurrent bouts of hypoglycemia overnight. Does have persistent elevated troponin. There is no further convulsive or seizure activity that was reported. The patient was unable to provide me a history today. Upon reviewing the clinic care everywhere, it appears the patient was previously on keppra from a cardiology note in 2020. At that time he was on 500 mg of Keppra twice a day. Past Medical History:   Diagnosis Date    Acid reflux     Alcohol abuse     Arthropathy, unspecified, site unspecified     Atrial fibrillation ([address]     Calcaneus fracture     Condyloma     Depression     Essential hypertension     H/O mitral valve repair 2006    Along with MAZE procedure    High cholesterol     History of angina     Hypertension     IHSS (idiopathic hypertrophic subaortic stenosis) ([facility])     Ill-defined condition     pacemaker    Impotence of organic origin     LA thrombus 05/2015    LA and ARNULFO thrombus    Pacemaker 2006    Thyroid disease     Trichilemmal cyst 4/10/2017    Unspecified cerebral artery occlusion with cerebral infarction         Past Surgical History:   Procedure Laterality Date    HX APPENDECTOMY      HX CYST REMOVAL      HX HEART CATHETERIZATION      HX OTHER SURGICAL  4/2005    HX OTHER SURGICAL  04/20/2017    excision of scalp mass    HX PACEMAKER  07/14/2006    [place] CARDIAC SURG PROCEDURE UNLIST  07/13/2006    Mitral Valve Replacement        No family history on file.    I am unable to obtain a family history from the patient today due to mental status    Social History     Tobacco Use    Smoking status: Former     Types: Cigarettes     Quit date: 2020     Years since quittin.0    Smokeless tobacco: Never   Substance Use Topics    Alcohol use: Yes     Alcohol/week: 0.0 standard drinks     Types: 1 - 2 Cans of beer per week     Comment: occ        Allergies   Allergen Reactions    Bactrim [Sulfamethoxazole-Trimethoprim] Other (comments)     GI Distress        Prior to Admission medications    Medication Sig Start Date End Date Taking? Authorizing Provider   acetaminophen (Tylenol Extra Strength) 500 mg tablet Take  by mouth every six (6) hours as needed for Pain. Provider, Historical   senna-docusate (Senna with Docusate Sodium) 8.6-50 mg per tablet Take 1 Tablet by mouth daily. Provider, Historical   levETIRAcetam (KEPPRA) 100 mg/mL solution 7.5 mL by PEG Tube route two (2) times a day. Ebony Corona MD   carvediloL (COREG) 6.25 mg tablet 6.25 mg by PEG Tube route two (2) times a day. Provider, Historical   levothyroxine (SYNTHROID) 50 mcg tablet 50 mcg by PEG Tube route Daily (before breakfast). Provider, Historical   lisinopriL (PRINIVIL, ZESTRIL) 5 mg tablet 5 mg by PEG Tube route daily. Patient not taking: Reported on 2021    Provider, Historical   dicyclomine (BENTYL) 20 mg tablet 20 mg by PEG Tube route two (2) times daily as needed for Other (for IBS). Provider, Historical   lactose-reduced food/fiber (JEVITY 1.2 JODY PO) 237 mL by PEG Tube route every four (4) hours. Patient not taking: Reported on 2021    Provider, Historical   HYDROcodone-acetaminophen (NORCO) 5-325 mg per tablet Take 1 Tab by mouth every four (4) hours as needed for Pain. Max Daily Amount: 6 Tabs. Patient not taking: Reported on 2021   Melvina Allen MD   furosemide (LASIX) 20 mg tablet 20 mg by PEG Tube route daily.   Patient not taking: Reported on 2021 Provider, Historical   mupirocin (BACTROBAN) 2 % ointment Apply  to affected area three (3) times daily. Patient not taking: Reported on 9/21/2021 3/28/16   Shanda Lyn DO   rivaroxaban (XARELTO) 20 mg tab tablet 20 mg by PEG Tube route daily. Provider, Historical   ondansetron (ZOFRAN ODT) 4 mg disintegrating tablet Take 1 Tab by mouth every eight (8) hours as needed for Nausea. Patient taking differently: 1 Tab by PEG Tube route two (2) times daily as needed for Nausea. 8/2/15   Katya Owens, BAN   atorvastatin (LIPITOR) 20 mg tablet 40 mg by PEG Tube route daily. Provider, Historical   FLUoxetine (PROZAC) 20 mg capsule 80 mg by PEG Tube route daily.     Provider, Historical     Current Facility-Administered Medications   Medication Dose Route Frequency    levETIRAcetam (KEPPRA) injection 1,000 mg  1,000 mg IntraVENous Q12H    sodium chloride (NS) flush 5-40 mL  5-40 mL IntraVENous Q8H    sodium chloride (NS) flush 5-40 mL  5-40 mL IntraVENous PRN    acetaminophen (TYLENOL) tablet 650 mg  650 mg Oral Q6H PRN    Or    acetaminophen (TYLENOL) suppository 650 mg  650 mg Rectal Q6H PRN    polyethylene glycol (MIRALAX) packet 17 g  17 g Oral DAILY PRN    ondansetron (ZOFRAN ODT) tablet 4 mg  4 mg Oral Q8H PRN    Or    ondansetron (ZOFRAN) injection 4 mg  4 mg IntraVENous Q6H PRN    lactulose (CHRONULAC) 10 gram/15 mL solution 300 mL  200 g Rectal Q6H    [START ON 9/4/2022] enoxaparin (LOVENOX) injection 70 mg  1 mg/kg SubCUTAneous Q12H    cefTRIAXone (ROCEPHIN) 1 g in 0.9% sodium chloride (MBP/ADV) 50 mL MBP  1 g IntraVENous Q24H    dextrose 5% and 0.9% NaCl infusion  75 mL/hr IntraVENous CONTINUOUS    dextrose 10% infusion 0-250 mL  0-250 mL IntraVENous PRN    dextrose 10% 10 % infusion        vancomycin (VANCOCIN) 1750 mg in  ml infusion  1,750 mg IntraVENous ONCE    vancomycin (VANCOCIN) 1,000 mg in 0.9% sodium chloride 250 mL ([id number])  1,000 mg IntraVENous Q24H         Review of Systems:    [x]Unable to obtain  ROS due to  [x]mental status change  []sedated   []intubated    Objective:     Visit Vitals  BP (!) 148/93   Pulse 70   Temp 98.5 °F (36.9 °C)   Resp 28   Ht 5' 7\" (1.702 m)   Wt 165 lb 3.2 oz (74.9 kg)   SpO2 98%   BMI 25.87 kg/m²       Physical Exam:    General:  appears well nourished in no acute distress  Neck: no carotid bruits  Lungs: clear to auscultation  Heart:  no murmurs, regular rate  Lower extremity: peripheral pulses palpable and no edema  Skin: intact    Neurological exam:    The patient is nonverbal.  He does have spontaneous eye movements to voice. He would not follow any commands. There was no mimicking that occurred. He does not track me around the room today. Cranial nerves:   II-XII were tested    Perrrla  Fundoscopic examination attempted but difficult to visualize fundus due to cooperation  Visual fields -he does appear to blink to visual threat  Eomi, no evidence of nystagmus  Facial sensation:  normal and symmetric  Facial motor: Mild asymmetry  He would not protrude his tongue    Motor: Tone -diminished throughout    No evidence of fasciculations    Strength testing: He would not participate with motor testing. He does grimace to painful stimulation and has mild withdrawal noted on the left. Sensory:  Upper extremity: Grimace to painful stimulation, more so on the left  Lower extremity: grimace to painful stimulation, more so on the left. Reflexes:    Right Left  Biceps  2 2  Triceps     2 2  Brachiorad. 2 2  Patella  2 2  Achilles - -    Plantar response:  extensor bilaterally      Cerebellar testing:  no tremor apparent  Gait: not attempted due to mental status.     Labs:     Lab Results   Component Value Date/Time    Sodium 138 09/03/2022 04:24 AM    Potassium 5.0 09/03/2022 04:24 AM    Chloride 105 09/03/2022 04:24 AM    Glucose 41 (LL) 09/03/2022 04:24 AM    BUN 16 09/03/2022 04:24 AM    Creatinine 2.26 (H) 09/03/2022 04:24 AM Calcium 10.5 (H) 09/03/2022 04:24 AM    WBC 17.8 (H) 09/03/2022 04:24 AM    HCT 49.8 09/03/2022 04:24 AM    HGB 15.2 09/03/2022 04:24 AM    PLATELET 666 (L) 67/99/0762 04:24 AM       Imaging:    No results found for this or any previous visit. Results from [place] encounter on 09/02/22    CT HEAD WO CONT    Narrative  EXAM: CT HEAD WO CONT    INDICATION: AMS    COMPARISON: 5/15/2015. CONTRAST: None. TECHNIQUE: Unenhanced CT of the head was performed using 5 mm images. Brain and  bone windows were generated. Coronal and sagittal reformats. CT dose reduction  was achieved through use of a standardized protocol tailored for this  examination and automatic exposure control for dose modulation. There is motion  artifact    FINDINGS:  Extraocular dilatation of the left lateral ventricle. Extensive encephalomalacia  in the left posterior frontal parietal and temporal lobe as well as the right  posterior frontal lobe. There is no intracranial hemorrhage, extra-axial  collection, or mass effect. The basilar cisterns are open. No CT evidence of  acute infarct. The bone windows demonstrate no abnormalities. The visualized portions of the  paranasal sinuses and mastoid air cells are clear. Impression  Extensive encephalomalacia not significant change. No acute abnormality      I did independently review the head CT from September 2, 2022. There was extensive encephalomalacia in the left posterior frontal parietal and temporal as well as a right posterior frontal lobe. Reportedly this is unchanged from a prior study in 2015. I spent  70   minutes providing care to this  acutely ill inpatient with > 50% of the time counseling and assisting in the coordination of care of the patient on the patient's hospital floor/unit.         Active Problems:    Seizure ([facility] [address] [address] (9/3/2022)                   Signed By:  Ramin Velasquez DO FAAN    September 3, 2022

## 2022-09-03 NOTE — ED NOTES
TRANSITION OF CARE - SBAR OUT    Patient is being transferred to [facility], Room# 2242. Report GIVEN TO LORENZA Portillo on [facility] for routine progression of care. Opportunity to ask questions provided, all questions answered. Report consisted of the following information SBAR, ED Summary, MAR and Recent Results, Cardiac rhythm. Patient transferred to receiving unit by: Casandra Isaac and Kellen Gustafson RN     Called outstanding consults: Yes   Collected routine labs: Yes     All current orders reviewed with accepting nurse: Yes    The following personal items will be sent with the patient during transfer to the floor:   All valuables:                          CARDIAC MONITORING ORDERED: Yes     The following CURRENT information were reported to the receiving RN:    CODE STATUS: Full Code    NIH SCORE:    MALENA SCREENING: Swallow Screening  Is the Patient Unable to Remain Alert for Testing?: (!) Yes (09/02/22 2243)    NEURO ASSESSMENT: Neuro  Neurologic State: Eyes open to voice, Postictal (09/02/22 2322)  Orientation Level: Unable to verbalize (Postictal, not responding) (09/02/22 2322)  Cognition: Unable to assess (comment) (Postictal, not responding) (09/02/22 2322)  Speech: Aphasic (comment) (Postictal, not responding) (09/02/22 2322)  LUE Motor Response: No movement to any stimulus (Postictal, not responding) (09/02/22 2322)  LLE Motor Response: No movement to any stimulus (Postictal, not responding) (09/02/22 2322)  RUE Motor Response:  (Postictal, not responding) (09/02/22 2322)  RLE Motor Response:  (Postictal, not responding) (09/02/22 2322)      RESTRAINTS IN USE: No      IS DOCUMENTATION COMPLETE: Yes      Vital Signs  Level of Consciousness: Responds to Voice (1) (09/03/22 0128)  Temp: 98.9 °F (37.2 °C) (09/03/22 0128)  Temp Source: Oral (09/03/22 0128)  Pulse (Heart Rate): 70 (09/03/22 0128)  Heart Rate Source: Monitor (09/03/22 0128)  Resp Rate: 21 (09/03/22 0128)  BP: (!) 143/84 (09/03/22 0128)  MAP (Monitor): 102 (09/03/22 0128)  MAP (Calculated): 104 (09/03/22 0128)  BP 1 Location: Right lower arm (09/03/22 0128)  BP 1 Method: Automatic (09/03/22 0128)  BP Patient Position: Lying (09/03/22 0128)  MEWS Score: 3 (09/03/22 0128)  Pain 1  Pain Scale 1: Visual (09/03/22 0128)  Pain Intensity 1: 0 (09/03/22 0128)  Patient Stated Pain Goal: 0 (09/03/22 0128)  Pain Reassessment 1: Yes (09/03/22 0128)      OXYGEN: Oxygen Therapy  O2 Device: Nasal cannula (09/03/22 0128)  O2 Flow Rate (L/min): 2 l/min (09/03/22 0128)    KINDER FALL ASSESSMENT:  Presents to emergency department  because of falls (Syncope, seizure, or loss of consciousness): Yes, Age > 70: No, Altered Mental Status, Intoxication with alcohol or substance confusion (Disorientation, impaired judgment, poor safety awaremess, or inability to follow instructions): No, Impaired Mobility: Ambulates or transfers with assistive devices or assistance; Unable to ambulate or transer.: No, Nursing Judgement : Yes    WOUNDS: No      URINARY CATHETER: voiding and due to void    LINE ACCESS:   Peripheral IV 89/51/33 Left;Upper Basilic (Active)       Peripheral IV 09/02/22 Right;Upper Basilic (Active)   Site Assessment Clean, dry, & intact 09/02/22 2245   Phlebitis Assessment 0 09/02/22 2245   Infiltration Assessment 0 09/02/22 2245   Dressing Status Clean, dry, & intact 09/02/22 2245   Dressing Type Tape;Transparent 09/02/22 2245   Hub Color/Line Status Green;Flushed;Patent 09/02/22 2245   Action Taken Blood drawn 09/02/22 2245        Opportunity for questions and clarification were provided.   Jessica Wolfe RN

## 2022-09-03 NOTE — PROGRESS NOTES
Hospitalist Progress Note    NAME: Claudene Mc   :  1965   MRN:  [id number]     Admit date: 2022    Today's date: 22    PCP: David Valiente MD    Anticipated discharge date: 2022    Barriers:      Assessment / Plan:    Tonic-clonic seizure POA  Acute metabolic encephalopathy POA ? Post-ictal, ? Hepatic encephalopathy, ? Sepsis, hypoglycemia  Hypoglycemia glucose 23 POA  Baseline aphasia and right hemiparesis secondary to old CVA POA  Leukocytosis POA  Lactic acidosis lactate 7.0 POA  Lactic acidosis may relate to seizure and poor hepatic clearance  Prior on seizure meds  Now with recurrent seizure. Started on keppra  Arousable, not consistently following commands  Not able to assess for asterixis  Known cirrhosis, ammonia elevated, ? Clinical significance  Continue keppra  EEG abnormal.   Mild to moderate generalized slowing as seen in encephalopathies. There is no focal asymmetry, seizures or epileptiform discharges seen. Empirically on antibiotics, elevated WBC but no carlos SIRS criteria   No fevers   No cultures or UA sent   Check paired BC    Straight cath for UA  CXR with no ASD  CT abdomen/pelvis negative for acute pathology  BS dropped to 23, acute stress and poor hepatic function   Check BS every 2 hours   D5NS  IVF    Cirrhosis POA  Hyperammonemia NH4 113 POA  Hyperbilirubinemia POA  Alcohol abuse POA  Trace perihepatic ascites on CT scan  Other reasons to be altered, ?  Significance of elevated ammonia  Serial levels, lactulose enemas    Acute kidney injury POA creatinine 2.26  Last baseline 1 year ago was 1.62  IVF  Serial labs  Check urine NA  No hydronephrosis on the CT scan    Elevated troponin 299 in setting of sepsis, seizure  Atrial fibrillation  Essential hypertension  Idiopathic hypertrophic subaortic stenosis  Status post PPM-unclear indication (tachybradycardia syndrome?)     Code Status: Full     DVT Prophylaxis: Lovenox  GI Prophylaxis: not indicated  Diet: N.p.o.    Body mass index is 25.87 kg/m². Subjective:     Chief Complaint / Reason for Physician Visit   Discussed with RN events overnight. Baseline non verbal, now not following commands  Not able to provide any history    Review of Systems:  Symptom Y/N Comments  Symptom Y/N Comments   Fever/Chills n   Chest Pain n    Poor Appetite    Edema     Cough n   Abdominal Pain n    Sputum    Joint Pain     SOB/CARDOZA n   Headache     Nausea/vomit n   Tolerating PT/OT     Diarrhea n   Tolerating Diet n    Constipation    Other       Could NOT obtain due to:      Objective:     VITALS:   Last 24hrs VS reviewed since prior progress note.  Most recent are:  Patient Vitals for the past 24 hrs:   Temp Pulse Resp BP SpO2   09/03/22 1200 97.9 °F (36.6 °C) 70 23 102/80 100 %   09/03/22 1146 97.9 °F (36.6 °C) 70 24 (!) 167/75 100 %   09/03/22 1128 -- 70 24 116/75 100 %   09/03/22 1000 -- 74 19 (!) 122/92 100 %   09/03/22 0800 97.6 °F (36.4 °C) 71 27 (!) 121/95 100 %   09/03/22 0600 -- 70 28 (!) 148/93 98 %   09/03/22 0402 98.5 °F (36.9 °C) 71 23 138/80 97 %   09/03/22 0400 -- 70 -- -- --   09/03/22 0318 -- 70 20 132/82 99 %   09/03/22 0217 98.8 °F (37.1 °C) 78 19 131/69 97 %   09/03/22 0128 98.9 °F (37.2 °C) 70 21 (!) 143/84 98 %   09/03/22 0118 -- 70 19 (!) 147/86 100 %   09/03/22 0103 -- 70 20 (!) 149/84 98 %   09/03/22 0048 -- 71 17 (!) 156/86 98 %   09/03/22 0033 -- 70 18 (!) 154/93 96 %   09/03/22 0018 -- 71 12 (!) 160/92 98 %   09/03/22 0003 -- 72 15 (!) 162/93 97 %   09/02/22 2348 -- 70 15 (!) 172/91 98 %   09/02/22 2333 -- 71 13 (!) 165/92 99 %   09/02/22 2322 99.5 °F (37.5 °C) 71 19 (!) 181/92 99 %   09/02/22 2318 -- 71 12 (!) 166/96 100 %   09/02/22 2237 -- 73 12 (!) 194/97 100 %   09/02/22 2152 -- 78 19 (!) 160/96 97 %   09/02/22 2133 99.4 °F (37.4 °C) 82 22 (!) 150/97 99 %     No intake or output data in the 24 hours ending 09/03/22 1329     Wt Readings from Last 12 Encounters:   09/02/22 74.9 kg (165 lb 3.2 oz)   09/21/21 77.5 kg (170 lb 12.8 oz)   08/13/21 76.7 kg (169 lb)   05/01/17 81.6 kg (180 lb)   04/20/17 81.6 kg (180 lb)   04/14/17 81.6 kg (180 lb)   04/10/17 82.6 kg (182 lb)   06/26/16 89 kg (196 lb 3.4 oz)   10/12/15 86.2 kg (190 lb)   08/15/15 93.8 kg (206 lb 12.8 oz)   06/30/15 90.7 kg (200 lb)   05/15/15 91.6 kg (201 lb 14.4 oz)       PHYSICAL EXAM:    I had a face to face encounter and independently examined this patient on 09/03/22 as outlined below:    General: WD, WN. Lethargic, no seizure activity,, no acute distress    EENT:  PERRL. Anicteric sclerae. MMM  Resp:  CTA bilaterally, no wheezing or rales. No accessory muscle use  CV:  Regular  rhythm,  No edema  GI:  Soft, Non distended, Non tender. +Bowel sounds, no rebound  Neurologic:  Lethargic, opens eyes when stimulated, not following commands  Psych:   Not anxious nor agitated  Skin:  No rashes. No jaundice    Reviewed most current lab test results and cultures  YES  Reviewed most current radiology test results   YES  Review and summation of old records today    NO  Reviewed patient's current orders and MAR    YES  PMH/ reviewed - no change compared to H&P  ________________________________________________________________________  Care Plan discussed with:    Comments   Patient     Family      RN     Care Manager     Consultant                        Multidiciplinary team rounds were held today with , nursing, pharmacist and clinical coordinator. Patient's plan of care was discussed; medications were reviewed and discharge planning was addressed.      ________________________________________________________________________      Comments   >50% of visit spent in counseling and coordination of care     ________________________________________________________________________  Lisa Gomez MD     Procedures: see electronic medical records for all procedures/Xrays and details which were not copied into this note but were reviewed prior to creation of Plan. LABS:  I reviewed today's most current labs and imaging studies.   Pertinent labs include:  Recent Labs     09/03/22 0424 09/02/22  2147   WBC 17.8* 14.4*   HGB 15.2 14.7   HCT 49.8 49.3   * 114*     Recent Labs     09/03/22  0424 09/02/22  2217    134*   K 5.0 5.4*    101   CO2 20* 22   GLU 41* 111*   BUN 16 15   CREA 2.26* 2.29*   CA 10.5* 10.1   ALB 4.1 4.3   TBILI 3.7* 3.5*   ALT 63 36

## 2022-09-03 NOTE — ED NOTES
Pt repositioned at this time. Resting, occasional eye-opening and tracking but not always responsive to voice. Pt moves left extremities seemingly with purpose.

## 2022-09-03 NOTE — ED PROVIDER NOTES
EMERGENCY DEPARTMENT HISTORY AND PHYSICAL EXAM      Date: 9/2/2022  Patient Name: Raquel Davis    History of Presenting Illness     No chief complaint on file. HPI: Raquel Davis, 60 y.o. male with history of atrial fibrillation, previous stroke, aphasic at baseline per EMS, congestive heart failure, liver disease, alcohol abuse presenting to ED with chief complaint of altered mental status. Per EMS, they were called for a seizure. They witnessed a seizure on arrival.  He was given 4 mg of Versed at which point he stopped seizing. His oxygen was low during that time but improved on a nonrebreather. His blood sugar was noted to be about 50, which was corrected, and is now over 100. Patient lives at a convalescent home in [place]. EMS states that facility had very little information for them about the patient. There are no other complaints, changes, or physical findings at this time. PCP: Wilmer Aguirre MD    No current facility-administered medications on file prior to encounter. Current Outpatient Medications on File Prior to Encounter   Medication Sig Dispense Refill    acetaminophen (Tylenol Extra Strength) 500 mg tablet Take  by mouth every six (6) hours as needed for Pain. senna-docusate (Senna with Docusate Sodium) 8.6-50 mg per tablet Take 1 Tablet by mouth daily. levETIRAcetam (KEPPRA) 100 mg/mL solution 7.5 mL by PEG Tube route two (2) times a day. carvediloL (COREG) 6.25 mg tablet 6.25 mg by PEG Tube route two (2) times a day. levothyroxine (SYNTHROID) 50 mcg tablet 50 mcg by PEG Tube route Daily (before breakfast). lisinopriL (PRINIVIL, ZESTRIL) 5 mg tablet 5 mg by PEG Tube route daily. (Patient not taking: Reported on 9/21/2021)      dicyclomine (BENTYL) 20 mg tablet 20 mg by PEG Tube route two (2) times daily as needed for Other (for IBS). lactose-reduced food/fiber (JEVITY 1.2 JODY PO) 237 mL by PEG Tube route every four (4) hours.  (Patient not taking: Reported on 9/21/2021)      HYDROcodone-acetaminophen (NORCO) 5-325 mg per tablet Take 1 Tab by mouth every four (4) hours as needed for Pain. Max Daily Amount: 6 Tabs. (Patient not taking: Reported on 9/21/2021) 6 Tab 0    furosemide (LASIX) 20 mg tablet 20 mg by PEG Tube route daily. (Patient not taking: Reported on 9/21/2021)      mupirocin (BACTROBAN) 2 % ointment Apply  to affected area three (3) times daily. (Patient not taking: Reported on 9/21/2021) 22 g 0    rivaroxaban (XARELTO) 20 mg tab tablet 20 mg by PEG Tube route daily. ondansetron (ZOFRAN ODT) 4 mg disintegrating tablet Take 1 Tab by mouth every eight (8) hours as needed for Nausea. (Patient taking differently: 1 Tab by PEG Tube route two (2) times daily as needed for Nausea.) 10 Tab 0    atorvastatin (LIPITOR) 20 mg tablet 40 mg by PEG Tube route daily. FLUoxetine (PROZAC) 20 mg capsule 80 mg by PEG Tube route daily.          Past History     Past Medical History:  Past Medical History:   Diagnosis Date    Acid reflux     Alcohol abuse     Arthropathy, unspecified, site unspecified     Atrial fibrillation ([address]     Calcaneus fracture     Condyloma     Depression     Essential hypertension     H/O mitral valve repair 2006    Along with MAZE procedure    High cholesterol     History of angina     Hypertension     IHSS (idiopathic hypertrophic subaortic stenosis) ([facility])     Ill-defined condition     pacemaker    Impotence of organic origin     LA thrombus 05/2015    LA and ARNULFO thrombus    Pacemaker 2006    Thyroid disease     Trichilemmal cyst 4/10/2017    Unspecified cerebral artery occlusion with cerebral infarction        Past Surgical History:  Past Surgical History:   Procedure Laterality Date    HX APPENDECTOMY      HX CYST REMOVAL      HX HEART CATHETERIZATION      HX OTHER SURGICAL  4/2005    HX OTHER SURGICAL  04/20/2017    excision of scalp mass    HX PACEMAKER  07/14/2006    AL CARDIAC SURG PROCEDURE UNLIST  07/13/2006 Mitral Valve Replacement       Family History:  No family history on file. Social History:  Social History     Tobacco Use    Smoking status: Former     Types: Cigarettes     Quit date: 2020     Years since quittin.0    Smokeless tobacco: Never   Vaping Use    Vaping Use: Never used   Substance Use Topics    Alcohol use: Yes     Alcohol/week: 0.0 standard drinks     Types: 1 - 2 Cans of beer per week     Comment: occ    Drug use: Never       Allergies: Allergies   Allergen Reactions    Bactrim [Sulfamethoxazole-Trimethoprim] Other (comments)     GI Distress         Review of Systems   Review of Systems   Unable to perform ROS: Mental status change     Physical Exam   Physical Exam  Vitals and nursing note reviewed. Constitutional:       Comments: Post-ictal, maintaining airway   HENT:      Head: Normocephalic and atraumatic. Mouth/Throat:      Mouth: Mucous membranes are moist.   Eyes:      Pupils: Pupils are equal, round, and reactive to light. Cardiovascular:      Rate and Rhythm: Normal rate and regular rhythm. Pulmonary:      Effort: Pulmonary effort is normal.      Breath sounds: Rhonchi present. Abdominal:      Palpations: Abdomen is soft. Tenderness: There is no abdominal tenderness. Musculoskeletal:         General: Swelling (1+ BLE) present. Skin:     General: Skin is warm.    Neurological:      Comments: Post-ictal, no obvious facial droop       Diagnostic Study Results     Labs -     Recent Results (from the past 24 hour(s))   GLUCOSE, POC    Collection Time: 22  9:35 PM   Result Value Ref Range    Glucose (POC) 103 65 - 117 mg/dL    Performed by Tyler Paulino    EKG, 12 LEAD, INITIAL    Collection Time: 22  9:43 PM   Result Value Ref Range    Ventricular Rate 84 BPM    Atrial Rate 43 BPM    QRS Duration 154 ms    Q-T Interval 432 ms    QTC Calculation (Bezet) 510 ms    Calculated R Axis 111 degrees    Calculated T Axis -70 degrees    Diagnosis Undetermined rhythm  Nonspecific intraventricular block  Lateral infarct , age undetermined  When compared with ECG of 14-APR-2017 11:50,  Current undetermined rhythm precludes rhythm comparison, needs review  Nonspecific intraventricular block has replaced Incomplete right bundle   branch block  Lateral infarct is now present     BLOOD GAS,CHEM8,LACTIC ACID POC    Collection Time: 09/02/22  9:45 PM   Result Value Ref Range    Calcium, ionized (POC) 1.23 1.12 - 1.32 mmol/L    BICARBONATE 25 mmol/L    Base deficit (POC) 9.2 mmol/L    Sample source VENOUS BLOOD      CO2, POC 26 (H) 19 - 24 MMOL/L    Sodium,  136 - 145 MMOL/L    Potassium, POC 5.6 (H) 3.5 - 5.5 MMOL/L    Chloride,  100 - 108 MMOL/L    Glucose,  (H) 74 - 106 MG/DL    Creatinine, POC 2.2 (H) 0.6 - 1.3 MG/DL    Lactic Acid (POC) 7.69 (HH) 0.40 - 2.00 mmol/L    Critical value read back MCKNIGHT     pH, venous (POC) 7.05 (LL) 7.32 - 7.42      pCO2, venous (POC) 89.5 (H) 41 - 51 MMHG    pO2, venous (POC) 37 25 - 40 mmHg   SAMPLES BEING HELD    Collection Time: 09/02/22  9:47 PM   Result Value Ref Range    SAMPLES BEING HELD BLUE, SST     COMMENT        Add-on orders for these samples will be processed based on acceptable specimen integrity and analyte stability, which may vary by analyte. CBC WITH AUTOMATED DIFF    Collection Time: 09/02/22  9:47 PM   Result Value Ref Range    WBC 14.4 (H) 4.1 - 11.1 K/uL    RBC 6.31 (H) 4.10 - 5.70 M/uL    HGB 14.7 12.1 - 17.0 g/dL    HCT 49.3 36.6 - 50.3 %    MCV 78.1 (L) 80.0 - 99.0 FL    MCH 23.3 (L) 26.0 - 34.0 PG    MCHC 29.8 (L) 30.0 - 36.5 g/dL    RDW 18.2 (H) 11.5 - 14.5 %    PLATELET 116 (L) 072 - 400 K/uL    MPV ABNORMAL 8.9 - 12.9 FL    NRBC 0.0 0  WBC    ABSOLUTE NRBC 0.00 0.00 - 0.01 K/uL    NEUTROPHILS 91 (H) 32 - 75 %    LYMPHOCYTES 3 (L) 12 - 49 %    MONOCYTES 5 5 - 13 %    EOSINOPHILS 0 0 - 7 %    BASOPHILS 0 0 - 1 %    IMMATURE GRANULOCYTES 1 (H) 0.0 - 0.5 %    ABS.  NEUTROPHILS 13. 2 (H) 1.8 - 8.0 K/UL    ABS. LYMPHOCYTES 0.4 (L) 0.8 - 3.5 K/UL    ABS. MONOCYTES 0.7 0.0 - 1.0 K/UL    ABS. EOSINOPHILS 0.0 0.0 - 0.4 K/UL    ABS. BASOPHILS 0.0 0.0 - 0.1 K/UL    ABS. IMM. GRANS. 0.1 (H) 0.00 - 0.04 K/UL    DF SMEAR SCANNED      RBC COMMENTS ANISOCYTOSIS  1+        RBC COMMENTS MICROCYTOSIS  1+       COVID-19 RAPID TEST    Collection Time: 09/02/22  9:47 PM   Result Value Ref Range    Specimen source Nasopharyngeal      COVID-19 rapid test Not detected NOTD     METABOLIC PANEL, COMPREHENSIVE    Collection Time: 09/02/22 10:17 PM   Result Value Ref Range    Sodium 134 (L) 136 - 145 mmol/L    Potassium 5.4 (H) 3.5 - 5.1 mmol/L    Chloride 101 97 - 108 mmol/L    CO2 22 21 - 32 mmol/L    Anion gap 11 5 - 15 mmol/L    Glucose 111 (H) 65 - 100 mg/dL    BUN 15 6 - 20 MG/DL    Creatinine 2.29 (H) 0.70 - 1.30 MG/DL    BUN/Creatinine ratio 7 (L) 12 - 20      GFR est AA 36 (L) >60 ml/min/1.73m2    GFR est non-AA 30 (L) >60 ml/min/1.73m2    Calcium 10.1 8.5 - 10.1 MG/DL    Bilirubin, total 3.5 (H) 0.2 - 1.0 MG/DL    ALT (SGPT) 36 12 - 78 U/L    AST (SGOT) 35 15 - 37 U/L    Alk. phosphatase 262 (H) 45 - 117 U/L    Protein, total 9.4 (H) 6.4 - 8.2 g/dL    Albumin 4.3 3.5 - 5.0 g/dL    Globulin 5.1 (H) 2.0 - 4.0 g/dL    A-G Ratio 0.8 (L) 1.1 - 2.2     NT-PRO BNP    Collection Time: 09/02/22 10:17 PM   Result Value Ref Range    NT pro-BNP 2,709 (H) <125 PG/ML   AMMONIA    Collection Time: 09/02/22 10:17 PM   Result Value Ref Range    Ammonia, plasma 113 (H) <32 UMOL/L   ACETAMINOPHEN    Collection Time: 09/02/22 10:17 PM   Result Value Ref Range    Acetaminophen level <2 (L) 10 - 30 ug/mL   SALICYLATE    Collection Time: 09/02/22 10:17 PM   Result Value Ref Range    Salicylate level <0.1 (L) 2.8 - 20.0 MG/DL   ETHYL ALCOHOL    Collection Time: 09/02/22 10:17 PM   Result Value Ref Range    ALCOHOL(ETHYL),SERUM <10 <10 MG/DL       Radiologic Studies -   XR CHEST PORT   Final Result   1.  Enlarged cardiac silhouette, minimal atelectasis left base         CT HEAD WO CONT    (Results Pending)     CT Results  (Last 48 hours)      None          CXR Results  (Last 48 hours)                 09/02/22 2201  XR CHEST PORT Final result    Impression:  1. Enlarged cardiac silhouette, minimal atelectasis left base           Narrative:  INDICATION:  AMS        EXAM: Single portable view of chest 7383 . Comparison:4/14/2017       Findings: Cardiac silhouette is enlarged. Pulmonary vasculature is not engorged. Interval lead addition and changed to AICD in the left chest. Atelectasis left   base without pneumothorax or effusion. .                     Medical Decision Making   I am the first provider for this patient. I reviewed the vital signs, available nursing notes, past medical history, past surgical history, family history and social history. Vital Signs-Reviewed the patient's vital signs. No data found. Provider Notes (Medical Decision Making):   59 yo presenting to ED w sz. No longer seizing on arrival after versed in route. Maintaining airway. HTN but normal HR. O2 low for EMS so on supplementation, unclear if due to meds, will monitor. Given keppra. Plan for labs, imaging, and admission. Care transferred to Dr Danilo Cruz at end of shift pending completion of work up. EKG sinus, rate 84, PVCs, T wave inversion w STD in lateral leads, no ANNA    ED Course:     Initial assessment performed. The patients presenting problems have been discussed, and they are in agreement with the care plan formulated and outlined with them. I have encouraged them to ask questions as they arise throughout their visit. Critical Care Time:     45    Disposition:  admit    PLAN:  1. Current Discharge Medication List        2.    Follow-up Information    None       Return to ED if worse     Diagnosis     Clinical Impression: seizure

## 2022-09-03 NOTE — ED NOTES
Straight cath performed; 5mL of urine collected at this time. Wet brief noted. Dry brief placed at this time, no wounds noted.

## 2022-09-03 NOTE — ED NOTES
Notified Kevin GOULD of patient's Troponin level; spoke to provider regarding patient's retention enema, DO confirmed to wait to administer and reassess for patient's ability to retain.

## 2022-09-03 NOTE — PROGRESS NOTES
Pharmacy Antimicrobial Kinetic Dosing    Indication for Antimicrobials: Sepsis     Current Regimen of Each Antimicrobial:  Vancomycin 1000 mg IV q24h (Start Date 9/3; Day 1)  Ceftriaxone 1 g IV q24h (Start Date 9/3; Day 1)    Previous Antimicrobial Therapy:      Goal Level: AUC: 400-600 mg/hr/Liter/day    Date Dose & Interval Measured (mcg/mL) Predicted AUC/THU                       Date & time of next level:     Dosing calculator used: TidbitDotCoRHipLogic calculator    Significant Positive Cultures:     Conditions for Dosing Consideration: None    Labs:  Recent Labs     22  0424 22  2217   CREA 2.26* 2.29*   BUN 16 15     Recent Labs     22  0424 22  2147   WBC 17.8* 14.4*     Temp (24hrs), Av.6 °F (37 °C), Min:97.6 °F (36.4 °C), Max:99.5 °F (37.5 °C)        Creatinine Clearance (mL/min):   CrCl (Ideal Body Weight): 33.3   If actual weight < IBW: CrCl (Actual Body Weight) 37.8    Impression/Plan:   Vancomycin 1750 mg loading dose given  Give vancomycin 1000 mg for projected AUC at Css 517  Antimicrobial stop date TBD     Pharmacy will follow daily and adjust medications as appropriate for renal function and/or serum levels.     Thank you,  Don Starkey, PHARMD    Vancomycin Dosing Document    Documents located on pharmacy Teams site: Clinical Practice -> Antimicrobial Stewardship -> Antibiotics_Vancomycin     Aminoglycoside Dosing Document    Documents located on pharmacy Teams site: Clinical Practice -> Antimicrobial Stewardship -> Antibiotics_Aminoglycosides

## 2022-09-03 NOTE — H&P
This note is compiled to communicate with the medical care team. It may contain sensitive and protected information. It is not intended to serve as a source of communication with patients/families/friends; that communication occurs at the bedside or via alternative direct communications means (secure messaging, letters, video/telephone, etc.). Hospitalist Admission Note    NAME: Jonna Mcmanus   :  1965   MRN:  [id number]     Date/Time:  9/3/2022 2:17 AM    Patient PCP: Marianela Johnson MD  ______________________________________________________________________  Given the patient's current clinical presentation, I have a high level of concern for decompensation if discharged from the emergency department. Complex decision making was performed, which includes reviewing the patient's available past medical records, laboratory results, and x-ray films. My assessment of this patient's clinical condition and my plan of care is as follows. Subjective:   CHIEF COMPLAINT: Seizures    HISTORY OF PRESENT ILLNESS:     Jonna Mcmanus is a 60 y.o.  male with pertinent medical history as listed below who presents from convalescent home in [place] after witnessed seizure by EMS who provided 4 mg IV Versed with resolution of seizure activity. Blood sugar at that time was around 50 and corrected. EMS states that facility had very little information for them about the patient and patient arrived without any paperwork. Patient reportedly aphasic at baseline and unable to provide any history. History collected exclusively from chart review (limited recent records) and report. In the ED, patient hemodynamically stable (hypertensive 190s/90s improved to 140s/80s). Saturating well on room air. CXR demonstrated enlarged cardiac silhouette, minimal atelectasis in the left base. CT head demonstrated extensive encephalomalacia not significant change. No acute abnormality. Rapid COVID-negative. Labs demonstrate: Lactic 7.05 on presentation and improved to 4.7, WBC 14.4, hemoglobin 14.7, platelets 820, ammonia 113, ethyl alcohol undetectable, salicylates undetectable, Tylenol undetectable, proBNP 2709 (prior 22,000), high-sensitivity troponin 176 increased to 238, CK2 162. Sodium 134, potassium 5.4, glucose 111, creatinine 2.29 (1.6 to 1-year prior), AST 36 5, ALT 36, alk phos 262. We were asked to admit for work up and evaluation of the above problems. Past Medical History:   Diagnosis Date    Acid reflux     Alcohol abuse     Arthropathy, unspecified, site unspecified     Atrial fibrillation ([address]     Calcaneus fracture     Condyloma     Depression     Essential hypertension     H/O mitral valve repair     Along with MAZE procedure    High cholesterol     History of angina     Hypertension     IHSS (idiopathic hypertrophic subaortic stenosis) (HCC)     Ill-defined condition     pacemaker    Impotence of organic origin     LA thrombus 2015    LA and ARNULFO thrombus    Pacemaker     Thyroid disease     Trichilemmal cyst 4/10/2017    Unspecified cerebral artery occlusion with cerebral infarction         Past Surgical History:   Procedure Laterality Date    HX APPENDECTOMY      HX CYST REMOVAL      HX HEART CATHETERIZATION      HX OTHER SURGICAL  2005    HX OTHER SURGICAL  2017    excision of scalp mass    HX PACEMAKER  2006    IL CARDIAC SURG PROCEDURE UNLIST  2006    Mitral Valve Replacement       Social History     Tobacco Use    Smoking status: Former     Types: Cigarettes     Quit date: 2020     Years since quittin.0    Smokeless tobacco: Never   Substance Use Topics    Alcohol use: Yes     Alcohol/week: 0.0 standard drinks     Types: 1 - 2 Cans of beer per week     Comment: occ        No family history on file.   Unable to obtain    Allergies   Allergen Reactions    Bactrim [Sulfamethoxazole-Trimethoprim] Other (comments)     GI Distress        Prior to Admission medications    Medication Sig Start Date End Date Taking? Authorizing Provider   acetaminophen (Tylenol Extra Strength) 500 mg tablet Take  by mouth every six (6) hours as needed for Pain. Provider, Historical   senna-docusate (Senna with Docusate Sodium) 8.6-50 mg per tablet Take 1 Tablet by mouth daily. Provider, Historical   levETIRAcetam (KEPPRA) 100 mg/mL solution 7.5 mL by PEG Tube route two (2) times a day. Suzanne Johnson MD   carvediloL (COREG) 6.25 mg tablet 6.25 mg by PEG Tube route two (2) times a day. Provider, Historical   levothyroxine (SYNTHROID) 50 mcg tablet 50 mcg by PEG Tube route Daily (before breakfast). Provider, Historical   lisinopriL (PRINIVIL, ZESTRIL) 5 mg tablet 5 mg by PEG Tube route daily. Patient not taking: Reported on 9/21/2021    Provider, Historical   dicyclomine (BENTYL) 20 mg tablet 20 mg by PEG Tube route two (2) times daily as needed for Other (for IBS). Provider, Historical   lactose-reduced food/fiber (JEVITY 1.2 JODY PO) 237 mL by PEG Tube route every four (4) hours. Patient not taking: Reported on 9/21/2021    Provider, Historical   HYDROcodone-acetaminophen (NORCO) 5-325 mg per tablet Take 1 Tab by mouth every four (4) hours as needed for Pain. Max Daily Amount: 6 Tabs. Patient not taking: Reported on 9/21/2021 4/20/17   Donta Hein MD   furosemide (LASIX) 20 mg tablet 20 mg by PEG Tube route daily. Patient not taking: Reported on 9/21/2021    Provider, Historical   mupirocin (BACTROBAN) 2 % ointment Apply  to affected area three (3) times daily. Patient not taking: Reported on 9/21/2021 3/28/16   Marilyn Mckeon DO   rivaroxaban (XARELTO) 20 mg tab tablet 20 mg by PEG Tube route daily. Provider, Historical   ondansetron (ZOFRAN ODT) 4 mg disintegrating tablet Take 1 Tab by mouth every eight (8) hours as needed for Nausea. Patient taking differently: 1 Tab by PEG Tube route two (2) times daily as needed for Nausea.  8/2/15   Thur, Candice Dillard, BAN   atorvastatin (LIPITOR) 20 mg tablet 40 mg by PEG Tube route daily. Provider, Historical   FLUoxetine (PROZAC) 20 mg capsule 80 mg by PEG Tube route daily. Provider, Historical       REVIEW OF SYSTEMS:     I am not able to complete the review of systems because: The patient is intubated and sedated    The patient has altered mental status due to his acute medical problems   x The patient has baseline aphasia from prior stroke(s)    The patient has baseline dementia and is not reliable historian    The patient is in acute medical distress and unable to provide information           Objective:   VITALS:    Visit Vitals  BP (!) 143/84 (BP 1 Location: Right lower arm, BP Patient Position: Lying)   Pulse 70   Temp 98.9 °F (37.2 °C)   Resp 21   Ht 5' 7\" (1.702 m)   Wt 74.9 kg (165 lb 3.2 oz)   SpO2 98%   BMI 25.87 kg/m²       PHYSICAL EXAM:    General:   Somnolent, but easily aroused, altered, middle-aged -American male     HEENT:  Atraumatic, anicteric sclerae, pink conjunctivae, mucosa moist, dentition fair, no injury to tongue     Neck:  Supple, symmetrical, trachea midline, no abnormalities on palpation     Lungs:   CTAB. Symmetric expansion. Good aeration. Normal respiratory effort. Chest wall:  No tenderness. No Accessory muscle use. Cardiovascular:   RRR, No murmur/rub/gallop. No JVD. Radial/AT/DP pulse 2+     GI/:   Soft, non-tender. Not distended. Bowel sounds normal. No HSM     Extremities Mild pitting edema BLE no cyanosis. Capillary refill normal     Skin: No significant lesions noted. Not Jaundiced. No rashes      Neurologic: , Roving eye movements, no facial asymmetry, does not follow commands, spontaneous movements left upper and left lower extremity, appears to have flaccid paresis of right side (reportedly from old stroke)     Psych:  Alert and oriented X 4. Normal affect.  Good insight     Other:   N/A         LAB DATA REVIEWED:    Recent Results (from the past 24 hour(s))   GLUCOSE, POC    Collection Time: 09/02/22  9:35 PM   Result Value Ref Range    Glucose (POC) 103 65 - 117 mg/dL    Performed by Betsy Mandel    EKG, 12 LEAD, INITIAL    Collection Time: 09/02/22  9:43 PM   Result Value Ref Range    Ventricular Rate 84 BPM    Atrial Rate 43 BPM    QRS Duration 154 ms    Q-T Interval 432 ms    QTC Calculation (Bezet) 510 ms    Calculated R Axis 111 degrees    Calculated T Axis -70 degrees    Diagnosis       Undetermined rhythm  Nonspecific intraventricular block  Lateral infarct , age undetermined  When compared with ECG of 14-APR-2017 11:50,  Current undetermined rhythm precludes rhythm comparison, needs review  Nonspecific intraventricular block has replaced Incomplete right bundle   branch block  Lateral infarct is now present     BLOOD GAS,CHEM8,LACTIC ACID POC    Collection Time: 09/02/22  9:45 PM   Result Value Ref Range    Calcium, ionized (POC) 1.23 1.12 - 1.32 mmol/L    BICARBONATE 25 mmol/L    Base deficit (POC) 9.2 mmol/L    Sample source VENOUS BLOOD      CO2, POC 26 (H) 19 - 24 MMOL/L    Sodium,  136 - 145 MMOL/L    Potassium, POC 5.6 (H) 3.5 - 5.5 MMOL/L    Chloride,  100 - 108 MMOL/L    Glucose,  (H) 74 - 106 MG/DL    Creatinine, POC 2.2 (H) 0.6 - 1.3 MG/DL    Lactic Acid (POC) 7.69 (HH) 0.40 - 2.00 mmol/L    Critical value read back MCKNIGHT     pH, venous (POC) 7.05 (LL) 7.32 - 7.42      pCO2, venous (POC) 89.5 (H) 41 - 51 MMHG    pO2, venous (POC) 37 25 - 40 mmHg   SAMPLES BEING HELD    Collection Time: 09/02/22  9:47 PM   Result Value Ref Range    SAMPLES BEING HELD BLUE, SST     COMMENT        Add-on orders for these samples will be processed based on acceptable specimen integrity and analyte stability, which may vary by analyte.    CBC WITH AUTOMATED DIFF    Collection Time: 09/02/22  9:47 PM   Result Value Ref Range    WBC 14.4 (H) 4.1 - 11.1 K/uL    RBC 6.31 (H) 4.10 - 5.70 M/uL    HGB 14.7 12.1 - 17.0 g/dL    HCT 49.3 36.6 - 50.3 %    MCV 78.1 (L) 80.0 - 99.0 FL    MCH 23.3 (L) 26.0 - 34.0 PG    MCHC 29.8 (L) 30.0 - 36.5 g/dL    RDW 18.2 (H) 11.5 - 14.5 %    PLATELET 723 (L) 999 - 400 K/uL    MPV ABNORMAL 8.9 - 12.9 FL    NRBC 0.0 0  WBC    ABSOLUTE NRBC 0.00 0.00 - 0.01 K/uL    NEUTROPHILS 91 (H) 32 - 75 %    LYMPHOCYTES 3 (L) 12 - 49 %    MONOCYTES 5 5 - 13 %    EOSINOPHILS 0 0 - 7 %    BASOPHILS 0 0 - 1 %    IMMATURE GRANULOCYTES 1 (H) 0.0 - 0.5 %    ABS. NEUTROPHILS 13.2 (H) 1.8 - 8.0 K/UL    ABS. LYMPHOCYTES 0.4 (L) 0.8 - 3.5 K/UL    ABS. MONOCYTES 0.7 0.0 - 1.0 K/UL    ABS. EOSINOPHILS 0.0 0.0 - 0.4 K/UL    ABS. BASOPHILS 0.0 0.0 - 0.1 K/UL    ABS. IMM. GRANS. 0.1 (H) 0.00 - 0.04 K/UL    DF SMEAR SCANNED      RBC COMMENTS ANISOCYTOSIS  1+        RBC COMMENTS MICROCYTOSIS  1+       COVID-19 RAPID TEST    Collection Time: 09/02/22  9:47 PM   Result Value Ref Range    Specimen source Nasopharyngeal      COVID-19 rapid test Not detected NOTD     METABOLIC PANEL, COMPREHENSIVE    Collection Time: 09/02/22 10:17 PM   Result Value Ref Range    Sodium 134 (L) 136 - 145 mmol/L    Potassium 5.4 (H) 3.5 - 5.1 mmol/L    Chloride 101 97 - 108 mmol/L    CO2 22 21 - 32 mmol/L    Anion gap 11 5 - 15 mmol/L    Glucose 111 (H) 65 - 100 mg/dL    BUN 15 6 - 20 MG/DL    Creatinine 2.29 (H) 0.70 - 1.30 MG/DL    BUN/Creatinine ratio 7 (L) 12 - 20      GFR est AA 36 (L) >60 ml/min/1.73m2    GFR est non-AA 30 (L) >60 ml/min/1.73m2    Calcium 10.1 8.5 - 10.1 MG/DL    Bilirubin, total 3.5 (H) 0.2 - 1.0 MG/DL    ALT (SGPT) 36 12 - 78 U/L    AST (SGOT) 35 15 - 37 U/L    Alk.  phosphatase 262 (H) 45 - 117 U/L    Protein, total 9.4 (H) 6.4 - 8.2 g/dL    Albumin 4.3 3.5 - 5.0 g/dL    Globulin 5.1 (H) 2.0 - 4.0 g/dL    A-G Ratio 0.8 (L) 1.1 - 2.2     TROPONIN-HIGH SENSITIVITY    Collection Time: 09/02/22 10:17 PM   Result Value Ref Range    Troponin-High Sensitivity 176 (HH) 0 - 76 ng/L   NT-PRO BNP    Collection Time: 09/02/22 10:17 PM   Result Value Ref Range    NT pro-BNP 2,709 (H) <125 PG/ML   AMMONIA    Collection Time: 09/02/22 10:17 PM   Result Value Ref Range    Ammonia, plasma 113 (H) <32 UMOL/L   ACETAMINOPHEN    Collection Time: 09/02/22 10:17 PM   Result Value Ref Range    Acetaminophen level <2 (L) 10 - 30 ug/mL   SALICYLATE    Collection Time: 09/02/22 10:17 PM   Result Value Ref Range    Salicylate level <9.7 (L) 2.8 - 20.0 MG/DL   ETHYL ALCOHOL    Collection Time: 09/02/22 10:17 PM   Result Value Ref Range    ALCOHOL(ETHYL),SERUM <10 <10 MG/DL   CK    Collection Time: 09/02/22 10:17 PM   Result Value Ref Range     39 - 308 U/L   EKG, 12 LEAD, INITIAL    Collection Time: 09/02/22 11:35 PM   Result Value Ref Range    Ventricular Rate 70 BPM    Atrial Rate 263 BPM    QRS Duration 166 ms    Q-T Interval 456 ms    QTC Calculation (Bezet) 492 ms    Calculated R Axis -70 degrees    Calculated T Axis 98 degrees    Diagnosis       Ventricular-paced rhythm  When compared with ECG of 02-SEP-2022 21:43,  MANUAL COMPARISON REQUIRED, DATA IS UNCONFIRMED     TROPONIN-HIGH SENSITIVITY    Collection Time: 09/03/22 12:36 AM   Result Value Ref Range    Troponin-High Sensitivity 238 (HH) 0 - 76 ng/L   LACTIC ACID    Collection Time: 09/03/22  1:23 AM   Result Value Ref Range    Lactic acid 4.7 (HH) 0.4 - 2.0 MMOL/L       IMAGING:  CXR-1. Enlarged cardiac silhouette, minimal atelectasis left base    CT head:  FINDINGS:  Extraocular dilatation of the left lateral ventricle. Extensive encephalomalacia  in the left posterior frontal parietal and temporal lobe as well as the right  posterior frontal lobe. There is no intracranial hemorrhage, extra-axial  collection, or mass effect. The basilar cisterns are open. No CT evidence of  acute infarct. The bone windows demonstrate no abnormalities. The visualized portions of the  paranasal sinuses and mastoid air cells are clear. IMPRESSION        Extensive encephalomalacia not significant change.  No acute abnormality    EKG: V paced rhythm  ______________________________________________________________________    Assessment / Plan:   Witnessed seizure  Altered mental status  Baseline aphasia and right hemiparesis secondary to old CVA  History of alcohol abuse  Presumed BETH-unclear baseline renal function  Hyperammonemia  Atrial fibrillation  Essential hypertension  Idiopathic hypertrophic subaortic stenosis  Status post PPM-unclear indication (tachybradycardia syndrome?)    PLAN:    Admit to stepdown unit  Seizure precautions  Loaded with IV Keppra in ED-we will continue 1 g twice daily  Neurology consulted, greatly appreciate expertise  Limited information on patient's current medical therapies-attempt to obtain records from outside facility  Pharmacy to complete medication reconciliation  Rectal lactulose  May need NG tube placed if mentation does not improve  Xarelto listed-unclear if patient is still taking this-we will give therapeutic Lovenox while awaiting med rec  Trial 100 cc/h x 10-hour normal saline for suspected prerenal BETH  No evidence of volume overload on my exam less likely to be CHF exacerbation  Trend lactic acid until normalized-suspect elevated secondary to seizure-like activity  Obtain EEG with any recurrent seizure-like activity or persistently elevated lactic acid to rule out nonconvulsive status epilepticus  Bladder management protocol    Code Status: Full    DVT Prophylaxis: Lovenox  GI Prophylaxis: not indicated  Diet: N.p.o.       Care Plan discussed with:    Comments   Patient x    Family      RN     Care Manager                    Consultant:      _______________________________________________________________________  Prior Level of Function: Debilitated and convalescent home    Expected  Disposition:   Home with Family    HH/PT/OT/RN    SNF/LTC x   [facility]    ________________________________________________________________________  TOTAL TIME:  65 Minutes      Comments    x Reviewed previous records   >50% of visit spent in counseling and coordination of care x Discussion with patient and/or family and questions answered       ________________________________________________________________________  Signed: Bakari Stevens DO  9/3/2022 2:17 AM    Please note that this documentation was completed in part with Dragon dictation software. Occasionally unanticipated grammatical, syntax, homophones, and other interpretive errors are inadvertently transcribed by the computer software. Please excuse and disregard any errors that have escaped final proofreading. If in doubt, please do not hesitate to reach out to myself or the assigned hospitalist via [facility] paging system for clarification.

## 2022-09-03 NOTE — PROGRESS NOTES
INTERVAL    On review of labs noted patient lactic uptrending now at 7.0, troponin uptrending at 299. BMP notable for hypoglycemia 41. Interventions:  Hypoglycemia protocol, D10 bolus, D5 normal saline for maintenance thereafter  Accu-Cheks every 2 hours after hypoglycemia corrected  CT abdomen pelvis  Start empiric vancomycin and ceftriaxone  Continue to trend lactic and troponin    Called unit and spoke with primary nurse directly. Nurse verified patient has remained hemodynamically stable throughout shift.

## 2022-09-03 NOTE — PROGRESS NOTES
End of Shift Note    Bedside shift change report given to Dany (oncoming nurse) by Beatriz Ferrell RN (offgoing nurse).   Report included the following information SBAR, Kardex, ED Summary, Intake/Output, MAR, Accordion, Recent Results, Cardiac Rhythm V paced, and Alarm Parameters     Shift worked: 7A 7P-     Shift summary and any significant changes:     Abnormal labs, low BS 20s now improved to 97 after D10 bolus , CT abdo pending     Concerns for physician to address:  Patient very sick, anbtx, fluid and BS management     Zone phone for oncoming shift:          Activity:     Number times ambulated in hallways past shift: 0  Number of times OOB to chair past shift: 0    Cardiac:   Cardiac Monitoring: Yes      Cardiac Rhythm: Ventricular Paced    Access:  Current line(s): PIV     Genitourinary:   Urinary status: external catheter    Respiratory:   O2 Device: None (Room air)  Chronic home O2 use?: N/A  Incentive spirometer at bedside: N/A       GI:     Current diet:  DIET NPO  Passing flatus: YES  Tolerating current diet: NO       Pain Management:   Patient states pain is manageable on current regimen: YES    Skin:     Interventions: speciality bed, float heels, increase time out of bed, and foam dressing    Patient Safety:  Fall Score:    Interventions: bed/chair alarm, assistive device (walker, cane, etc), gripper socks, and pt to call before getting OOB       Length of Stay:  Expected LOS: - - -  Actual LOS: 0      Bandar Garcia RN

## 2022-09-03 NOTE — PROGRESS NOTES
0700: Report received from Kindra Martinez RN (offgoing nurse) to Dany [facility] (oncoming nurse). 0800[de-identified] Dr. Rosa Chavez at bedside . New orders for an EEG and continue Keppra IVPB. [id number] Kylie from lab called with critical lab values Troponin: 287 and Lactic acid: 6.4.    0915: Notified Dr. Chriss Armendariz of pt's critical lab values Troponin: 287 and Lactic acid: 6.4. No new orders. 0945: EEG at bedside for set up. 1010: Updated patient's sister, Kylee Ahuja, on current condition, plan of care, and answered any questions. Kylee Ahuja verbalized understanding of current condition  and plan of care. 1030: EEG completed. 1105: Pt off unit for CT of abdomen. 1120: Pt back to unit from CT. Dr. Chriss Armendariz at bedside to assess patient. 1200: Pt reassessed. No change. 1500: Attempted to collect blood cultures from pt. Attempted by LORENZA Saenz and LORENZA Sahni.     4866: Left a voicemail with PICC Team regarding the collection of blood cultures for the patient. 1600: Pt reassessed. Pt opens eyes spontaneously, will answer with yes or no responses, moves all extremities. 1700: Pt's sister, Kylee Auhja, at bedside. Kylee Ahuja provided notarized documentation of Power of [employer]. Copy placed on pt's chart. 1735: PICC Team at bedside to attempt to collect blood cultures. 1900: Report given to LORENZA Dale (oncoming nurse) by LORENZA Saenz (offgoing nurse).

## 2022-09-03 NOTE — PROCEDURES
EEG REPORT    Patient Name: Freda Armenta  : 1965  Age: 60 y.o. Ordering physician: Dr. Hein Nearing  Date of EE/3/2022  10:00 - 10:23  Diagnosis: seizuure  Interpreting physician: Adrian Hart. AWA Flores FAAN    Procedure: EEG    CLINICAL INDICATION: The patient is a 60 y.o. male who is being evaluated for baseline electro cerebral activities and to rule out seizure focus. Current Facility-Administered Medications   Medication Dose Route Frequency    levETIRAcetam (KEPPRA) injection 1,000 mg  1,000 mg IntraVENous Q12H    sodium chloride (NS) flush 5-40 mL  5-40 mL IntraVENous Q8H    sodium chloride (NS) flush 5-40 mL  5-40 mL IntraVENous PRN    acetaminophen (TYLENOL) tablet 650 mg  650 mg Oral Q6H PRN    Or    acetaminophen (TYLENOL) suppository 650 mg  650 mg Rectal Q6H PRN    polyethylene glycol (MIRALAX) packet 17 g  17 g Oral DAILY PRN    ondansetron (ZOFRAN ODT) tablet 4 mg  4 mg Oral Q8H PRN    Or    ondansetron (ZOFRAN) injection 4 mg  4 mg IntraVENous Q6H PRN    lactulose (CHRONULAC) 10 gram/15 mL solution 300 mL  200 g Rectal Q6H    [START ON 2022] enoxaparin (LOVENOX) injection 70 mg  1 mg/kg SubCUTAneous Q12H    cefTRIAXone (ROCEPHIN) 1 g in 0.9% sodium chloride (MBP/ADV) 50 mL MBP  1 g IntraVENous Q24H    dextrose 5% and 0.9% NaCl infusion  75 mL/hr IntraVENous CONTINUOUS    dextrose 10% infusion 0-250 mL  0-250 mL IntraVENous PRN    [START ON 2022] vancomycin (VANCOCIN) 1,000 mg in 0.9% sodium chloride 250 mL ([id number])  1,000 mg IntraVENous Q24H           DESCRIPTION OF PROCEDURE:     This is a digitally recorded electroencephalogram  Electrodes were applied in accordance with the international 10-20 system of electrode placement.     18 channels of scalp EEG are recorded  A channel was used for EoG  Another channel was used for ECG   The data is stored digitally and reviewed in reformatted montages for optimal display  EEG  was reviewed in both bipolar and referential montages    Description of Activity: The background of this recording contains no well-formed alpha activity seen. There was a mixtures of low amplitude diffuse theta and delta activity identified throughout the study. Throughout the recording, there were no clear areas of focal slowing nor spike or spike-and-wave discharges seen. Hyperventilation was not performed. Photic stimulation produced no response in the posterior head regions. During the recording, the patient did not achieve stage II sleep    Clinical Interpretation: This EEG is abnormal. There is mild to moderate generalized slowing as seen in encephalopathies. There is no focal asymmetry, seizures or epileptiform discharges seen. Clinical correlation is recommended        AWA Dickson

## 2022-09-04 LAB
ALBUMIN SERPL-MCNC: 3 G/DL (ref 3.5–5)
ALBUMIN/GLOB SERPL: 0.7 {RATIO} (ref 1.1–2.2)
ALP SERPL-CCNC: 183 U/L (ref 45–117)
ALT SERPL-CCNC: 492 U/L (ref 12–78)
AMMONIA PLAS-SCNC: 28 UMOL/L
ANION GAP SERPL CALC-SCNC: 6 MMOL/L (ref 5–15)
AST SERPL-CCNC: 546 U/L (ref 15–37)
BASOPHILS # BLD: 0 K/UL (ref 0–0.1)
BASOPHILS NFR BLD: 0 % (ref 0–1)
BILIRUB SERPL-MCNC: 2.6 MG/DL (ref 0.2–1)
BUN SERPL-MCNC: 31 MG/DL (ref 6–20)
BUN/CREAT SERPL: 12 (ref 12–20)
CALCIUM SERPL-MCNC: 8.6 MG/DL (ref 8.5–10.1)
CHLORIDE SERPL-SCNC: 112 MMOL/L (ref 97–108)
CO2 SERPL-SCNC: 25 MMOL/L (ref 21–32)
CREAT SERPL-MCNC: 2.49 MG/DL (ref 0.7–1.3)
DIFFERENTIAL METHOD BLD: ABNORMAL
EOSINOPHIL # BLD: 0 K/UL (ref 0–0.4)
EOSINOPHIL NFR BLD: 0 % (ref 0–7)
ERYTHROCYTE [DISTWIDTH] IN BLOOD BY AUTOMATED COUNT: 17.2 % (ref 11.5–14.5)
GLOBULIN SER CALC-MCNC: 4.1 G/DL (ref 2–4)
GLUCOSE BLD STRIP.AUTO-MCNC: 112 MG/DL (ref 65–117)
GLUCOSE BLD STRIP.AUTO-MCNC: 123 MG/DL (ref 65–117)
GLUCOSE BLD STRIP.AUTO-MCNC: 124 MG/DL (ref 65–117)
GLUCOSE BLD STRIP.AUTO-MCNC: 89 MG/DL (ref 65–117)
GLUCOSE BLD STRIP.AUTO-MCNC: 92 MG/DL (ref 65–117)
GLUCOSE BLD STRIP.AUTO-MCNC: 94 MG/DL (ref 65–117)
GLUCOSE BLD STRIP.AUTO-MCNC: 98 MG/DL (ref 65–117)
GLUCOSE BLD STRIP.AUTO-MCNC: 99 MG/DL (ref 65–117)
GLUCOSE BLD STRIP.AUTO-MCNC: 99 MG/DL (ref 65–117)
GLUCOSE SERPL-MCNC: 112 MG/DL (ref 65–100)
HCT VFR BLD AUTO: 42.6 % (ref 36.6–50.3)
HGB BLD-MCNC: 13.3 G/DL (ref 12.1–17)
IMM GRANULOCYTES # BLD AUTO: 0.1 K/UL (ref 0–0.04)
IMM GRANULOCYTES NFR BLD AUTO: 1 % (ref 0–0.5)
LYMPHOCYTES # BLD: 0.7 K/UL (ref 0.8–3.5)
LYMPHOCYTES NFR BLD: 5 % (ref 12–49)
MCH RBC QN AUTO: 23.1 PG (ref 26–34)
MCHC RBC AUTO-ENTMCNC: 31.2 G/DL (ref 30–36.5)
MCV RBC AUTO: 74 FL (ref 80–99)
MONOCYTES # BLD: 1.1 K/UL (ref 0–1)
MONOCYTES NFR BLD: 8 % (ref 5–13)
NEUTS SEG # BLD: 12.9 K/UL (ref 1.8–8)
NEUTS SEG NFR BLD: 86 % (ref 32–75)
NRBC # BLD: 0.02 K/UL (ref 0–0.01)
NRBC BLD-RTO: 0.1 PER 100 WBC
PLATELET # BLD AUTO: 96 K/UL (ref 150–400)
PMV BLD AUTO: ABNORMAL FL (ref 8.9–12.9)
POTASSIUM SERPL-SCNC: 3.9 MMOL/L (ref 3.5–5.1)
PROT SERPL-MCNC: 7.1 G/DL (ref 6.4–8.2)
RBC # BLD AUTO: 5.76 M/UL (ref 4.1–5.7)
SERVICE CMNT-IMP: ABNORMAL
SERVICE CMNT-IMP: ABNORMAL
SERVICE CMNT-IMP: NORMAL
SODIUM SERPL-SCNC: 143 MMOL/L (ref 136–145)
WBC # BLD AUTO: 14.9 K/UL (ref 4.1–11.1)

## 2022-09-04 PROCEDURE — 74011000258 HC RX REV CODE- 258: Performed by: INTERNAL MEDICINE

## 2022-09-04 PROCEDURE — 74011250636 HC RX REV CODE- 250/636: Performed by: STUDENT IN AN ORGANIZED HEALTH CARE EDUCATION/TRAINING PROGRAM

## 2022-09-04 PROCEDURE — 74011250636 HC RX REV CODE- 250/636: Performed by: INTERNAL MEDICINE

## 2022-09-04 PROCEDURE — 85025 COMPLETE CBC W/AUTO DIFF WBC: CPT

## 2022-09-04 PROCEDURE — 74011250637 HC RX REV CODE- 250/637: Performed by: STUDENT IN AN ORGANIZED HEALTH CARE EDUCATION/TRAINING PROGRAM

## 2022-09-04 PROCEDURE — 36415 COLL VENOUS BLD VENIPUNCTURE: CPT

## 2022-09-04 PROCEDURE — 99233 SBSQ HOSP IP/OBS HIGH 50: CPT | Performed by: PSYCHIATRY & NEUROLOGY

## 2022-09-04 PROCEDURE — 82962 GLUCOSE BLOOD TEST: CPT

## 2022-09-04 PROCEDURE — 74011000250 HC RX REV CODE- 250: Performed by: STUDENT IN AN ORGANIZED HEALTH CARE EDUCATION/TRAINING PROGRAM

## 2022-09-04 PROCEDURE — 82140 ASSAY OF AMMONIA: CPT

## 2022-09-04 PROCEDURE — 65270000046 HC RM TELEMETRY

## 2022-09-04 PROCEDURE — 74011000258 HC RX REV CODE- 258: Performed by: STUDENT IN AN ORGANIZED HEALTH CARE EDUCATION/TRAINING PROGRAM

## 2022-09-04 PROCEDURE — 80053 COMPREHEN METABOLIC PANEL: CPT

## 2022-09-04 RX ORDER — METRONIDAZOLE 500 MG/100ML
500 INJECTION, SOLUTION INTRAVENOUS EVERY 12 HOURS
Status: DISCONTINUED | OUTPATIENT
Start: 2022-09-04 | End: 2022-09-09

## 2022-09-04 RX ADMIN — DEXTROSE AND SODIUM CHLORIDE 125 ML/HR: 5; 900 INJECTION, SOLUTION INTRAVENOUS at 13:35

## 2022-09-04 RX ADMIN — DEXTROSE AND SODIUM CHLORIDE 75 ML/HR: 5; 900 INJECTION, SOLUTION INTRAVENOUS at 23:19

## 2022-09-04 RX ADMIN — LACTULOSE 300 ML: 10 SOLUTION ORAL at 18:54

## 2022-09-04 RX ADMIN — ENOXAPARIN SODIUM 70 MG: 100 INJECTION SUBCUTANEOUS at 23:19

## 2022-09-04 RX ADMIN — LACTULOSE 300 ML: 10 SOLUTION ORAL at 23:20

## 2022-09-04 RX ADMIN — METRONIDAZOLE 500 MG: 500 INJECTION, SOLUTION INTRAVENOUS at 23:19

## 2022-09-04 RX ADMIN — ENOXAPARIN SODIUM 70 MG: 100 INJECTION SUBCUTANEOUS at 00:21

## 2022-09-04 RX ADMIN — METRONIDAZOLE 500 MG: 500 INJECTION, SOLUTION INTRAVENOUS at 11:04

## 2022-09-04 RX ADMIN — LEVETIRACETAM 1000 MG: 100 INJECTION INTRAVENOUS at 23:19

## 2022-09-04 RX ADMIN — SODIUM CHLORIDE, PRESERVATIVE FREE 10 ML: 5 INJECTION INTRAVENOUS at 06:43

## 2022-09-04 RX ADMIN — LACTULOSE 300 ML: 10 SOLUTION ORAL at 14:25

## 2022-09-04 RX ADMIN — LEVETIRACETAM 1000 MG: 100 INJECTION INTRAVENOUS at 09:31

## 2022-09-04 RX ADMIN — VANCOMYCIN HYDROCHLORIDE 1000 MG: 1 INJECTION, POWDER, LYOPHILIZED, FOR SOLUTION INTRAVENOUS at 11:03

## 2022-09-04 RX ADMIN — SODIUM CHLORIDE, PRESERVATIVE FREE 10 ML: 5 INJECTION INTRAVENOUS at 14:37

## 2022-09-04 RX ADMIN — SODIUM CHLORIDE, PRESERVATIVE FREE 10 ML: 5 INJECTION INTRAVENOUS at 23:19

## 2022-09-04 RX ADMIN — ENOXAPARIN SODIUM 70 MG: 100 INJECTION SUBCUTANEOUS at 11:04

## 2022-09-04 RX ADMIN — LACTULOSE 300 ML: 10 SOLUTION ORAL at 06:43

## 2022-09-04 RX ADMIN — LACTULOSE 300 ML: 10 SOLUTION ORAL at 00:21

## 2022-09-04 RX ADMIN — SODIUM CHLORIDE 1 G: 900 INJECTION INTRAVENOUS at 05:16

## 2022-09-04 RX ADMIN — DEXTROSE AND SODIUM CHLORIDE 125 ML/HR: 5; 900 INJECTION, SOLUTION INTRAVENOUS at 03:45

## 2022-09-04 NOTE — PROGRESS NOTES
2030: Patient is in bed, awake and confused, unlabored breathing on 2L nc w/ diminished at the bases, afebrile, able to speak a few words, R side weakness noted, vitals stable, external urinary cath in place, patient has finally made some parul colored urine, assessment completed and documented, BS monitoring Q2 in progress, maintenance IVF D5NS @ 125 ml /hr infusing, NPO at this time, seizure precautions in place, will continue to monitor patient. 0030: Patient remains in stable condition, o2 sat 100% on 2L NC, VSS, no pain voiced, , lactulose rectally administered, continue monitoring. Problem: Pressure Injury - Risk of  Goal: *Prevention of pressure injury  Description: Document Ralph Scale and appropriate interventions in the flowsheet.   Outcome: Progressing Towards Goal  Variance Patient Condition  Impact: Moderate  Note: Pressure Injury Interventions:  Sensory Interventions: Assess changes in LOC         Activity Interventions: Assess need for specialty bed    Mobility Interventions: Assess need for specialty bed, HOB 30 degrees or less    Nutrition Interventions: Discuss nutritional consult with provider    Friction and Shear Interventions: Apply protective barrier, creams and emollients, Feet elevated on foot rest, HOB 30 degrees or less, Minimize layers

## 2022-09-04 NOTE — PROGRESS NOTES
Pharmacy Antimicrobial Kinetic Dosing    Indication for Antimicrobials: Sepsis     Current Regimen of Each Antimicrobial:  Vancomycin 1000 mg IV q24h (Start Date 9/3; Day 2)  Ceftriaxone 1 g IV q24h (Start Date 9/3; Day 2)  Metronidazole 500 mg IV q12h (Start Date ; Day 1)    Previous Antimicrobial Therapy:      Goal Level: AUC: 400-600 mg/hr/Liter/day    Date Dose & Interval Measured (mcg/mL) Predicted AUC/THU                       Date & time of next level:     Dosing calculator used: Contact At Once! calculator    Significant Positive Cultures:     Conditions for Dosing Consideration: None    Labs:  Recent Labs     22  0930 22  1401 22  0424 22  2217   CREA 2.49*  --  2.26* 2.29*   BUN 31*  --  16 15   PCT  --  18.95  --   --      Recent Labs     2230 22  0424 22  2147   WBC 14.9* 17.8* 14.4*     Temp (24hrs), Av.8 °F (36.6 °C), Min:97.4 °F (36.3 °C), Max:98.1 °F (36.7 °C)        Creatinine Clearance (mL/min):   CrCl (Ideal Body Weight): 30.2   If actual weight < IBW: CrCl (Actual Body Weight) 34.4    Impression/Plan:   Vancomycin 1750 mg loading dose given  Give vancomycin 1000 mg for projected AUC at Css 517  Vancomycin trough  @ 0900  Antimicrobial stop date TBD     Pharmacy will follow daily and adjust medications as appropriate for renal function and/or serum levels.     Thank you,  Alex Crabtree, PHARMD    Vancomycin Dosing Document    Documents located on pharmacy Teams site: Clinical Practice -> Antimicrobial Stewardship -> Antibiotics_Vancomycin     Aminoglycoside Dosing Document    Documents located on pharmacy Teams site: Clinical Practice -> Antimicrobial Stewardship -> Antibiotics_Aminoglycosides

## 2022-09-04 NOTE — PROGRESS NOTES
End of Shift Note    Bedside shift change report given to Cipriano Yoo (oncoming nurse) by Kyle Esquivel RN (offgoing nurse). Report included the following information SBAR, Kardex, ED Summary, Intake/Output, MAR, Accordion, Recent Results, Cardiac Rhythm V paced, and Alarm Parameters     Shift worked:  7P-7A     Shift summary and any significant changes:     BS q2 stable, no seizure activity, voided      Concerns for physician to address:  Medical management     Zone phone for oncoming shift:   [phone]       Activity:  Activity Level: Bed Rest  Number times ambulated in hallways past shift: 0  Number of times OOB to chair past shift: 0    Cardiac:   Cardiac Monitoring: Yes      Cardiac Rhythm: Ventricular Paced    Access:  Current line(s): Endurance cath    Genitourinary:   Urinary status: external catheter    Respiratory:   O2 Device: Nasal cannula  Chronic home O2 use?: NO  Incentive spirometer at bedside: NO       GI:  Last Bowel Movement Date: 09/03/22  Current diet:  DIET NPO  Passing flatus: YES  Tolerating current diet: YES       Pain Management:   Patient states pain is manageable on current regimen: YES    Skin:  Ralph Score: 14  Interventions: speciality bed, float heels, increase time out of bed, and foam dressing    Patient Safety:  Fall Score:  Total Score: 3  Interventions: bed/chair alarm, assistive device (walker, cane, etc), gripper socks, and pt to call before getting OOB  High Fall Risk: Yes    Length of Stay:  Expected LOS: - - -  Actual LOS: 1      Bandar Garcia RN

## 2022-09-04 NOTE — PROGRESS NOTES
Neurology Note    Patient ID:  Haylee Golden  [id number]  54 y.o.  1965      Date of Consultation:  September 4, 2022      Assessment and Plan:    The patient is a 58-year-old gentleman with multiple medical conditions with history of prior stroke and aphasia who was admitted to the hospital with seizure activity. No further seizures since admission. Seizures:  Medical records suggest that he was on a seizure medicine in the past.  Given his head CT and prior history of large cortical stroke, he does have a high risk of seizures. Continue Keppra 1000 mg twice a day. Eeg with generalized slowing and no ongoing seizures  Reason for breakthrough seizure is many: Electrolyte abnormality, subtherapeutic dosing of medication, infectious etiology. Head CT with no acute abnormality. Evidence of significant chronic microvascular disease and prior stroke  Correct underlying electrolyte abnormalities  Patient is on antibiotics for possible infectious etiology    Encephalopathy:  The patient does have a history of prior stroke with reported aphasia at baseline. Multiple ongoing medical conditions also contributing to potential worsening of his mental status. Elevated ammonia, acute kidney injury, elevated troponin. EEG with generalized slowing which would be consistent with multiple ongoing metabolic abnormalities. There was no seizures seen on EEG. Prior history of stroke:  He has been continued on anticoagulation due to history of atrial fibrillation. Continue to monitor glucose and blood pressure. Hyperammonemia  Acute kidney injury  Elevated troponin  Sepsis    There is no other additional neurology recommendations at this time. If questions arise, please not hesitate to contact me and I will return to see the patient. The patient should follow-up in clinic in 3 to 4 weeks time after discharge.          Subjective:  non verbal- more awake today     History of Present Illness:   Haylee Golden is a 60 y.o. male with a history of atrial fibrillation, prior stroke and aphasia at baseline, congestive heart failure, liver disease who presented to the emergency department due to altered mental status. EMS was called as the patient was having a seizure. He was given 4 mg of Versed which ultimately stopped seizure activity. He was noted to be hypoglycemic. He does live at the [address] [place] [address]. Since admission, there has been no further seizure activity. He is a bit more awake this morning. He denies any pain, numbness or tingling. Past Medical History:   Diagnosis Date    Acid reflux     Alcohol abuse     Arthropathy, unspecified, site unspecified     Atrial fibrillation ([address]     Calcaneus fracture     Condyloma     Depression     Essential hypertension     H/O mitral valve repair     Along with MAZE procedure    High cholesterol     History of angina     Hypertension     IHSS (idiopathic hypertrophic subaortic stenosis) (HCC)     Ill-defined condition     pacemaker    Impotence of organic origin     LA thrombus 2015    LA and ARNULFO thrombus    Pacemaker     Thyroid disease     Trichilemmal cyst 4/10/2017    Unspecified cerebral artery occlusion with cerebral infarction         Past Surgical History:   Procedure Laterality Date    HX APPENDECTOMY      HX CYST REMOVAL      HX HEART CATHETERIZATION      HX OTHER SURGICAL  2005    HX OTHER SURGICAL  2017    excision of scalp mass    HX PACEMAKER  2006    MA CARDIAC SURG PROCEDURE UNLIST  2006    Mitral Valve Replacement        No family history on file. I am unable to obtain a family history from the patient today due to mental status    Social History     Tobacco Use    Smoking status: Former     Types: Cigarettes     Quit date: 2020     Years since quittin.0    Smokeless tobacco: Never   Substance Use Topics    Alcohol use:  Yes     Alcohol/week: 0.0 standard drinks     Types: 1 - 2 Cans of beer per week Comment: occ        Allergies   Allergen Reactions    Bactrim [Sulfamethoxazole-Trimethoprim] Other (comments)     GI Distress          Current Facility-Administered Medications   Medication Dose Route Frequency    levETIRAcetam (KEPPRA) injection 1,000 mg  1,000 mg IntraVENous Q12H    sodium chloride (NS) flush 5-40 mL  5-40 mL IntraVENous Q8H    sodium chloride (NS) flush 5-40 mL  5-40 mL IntraVENous PRN    acetaminophen (TYLENOL) tablet 650 mg  650 mg Oral Q6H PRN    Or    acetaminophen (TYLENOL) suppository 650 mg  650 mg Rectal Q6H PRN    polyethylene glycol (MIRALAX) packet 17 g  17 g Oral DAILY PRN    ondansetron (ZOFRAN ODT) tablet 4 mg  4 mg Oral Q8H PRN    Or    ondansetron (ZOFRAN) injection 4 mg  4 mg IntraVENous Q6H PRN    lactulose (CHRONULAC) 10 gram/15 mL solution 300 mL  200 g Rectal Q6H    enoxaparin (LOVENOX) injection 70 mg  1 mg/kg SubCUTAneous Q12H    cefTRIAXone (ROCEPHIN) 1 g in 0.9% sodium chloride (MBP/ADV) 50 mL MBP  1 g IntraVENous Q24H    dextrose 5% and 0.9% NaCl infusion  125 mL/hr IntraVENous CONTINUOUS    dextrose 10% infusion 0-250 mL  0-250 mL IntraVENous PRN    vancomycin (VANCOCIN) 1,000 mg in 0.9% sodium chloride 250 mL ([id number])  1,000 mg IntraVENous Q24H         Review of Systems:    [x]Unable to obtain  ROS due to  [x]mental status change  []sedated   []intubated    Objective:     Visit Vitals  /88 (BP 1 Location: Right lower arm, BP Patient Position: At rest)   Pulse 74   Temp 98.1 °F (36.7 °C)   Resp 22   Ht 5' 7\" (1.702 m)   Wt 166 lb 0.1 oz (75.3 kg)   SpO2 95%   BMI 26.00 kg/m²       Physical Exam:    General:  appears well nourished in no acute distress  Neck: no carotid bruits  Lungs: clear to auscultation  Heart:  no murmurs, regular rate  Lower extremity: peripheral pulses palpable and no edema  Skin: intact    Neurological exam:    The patient is nonverbal.  He does have spontaneous eye movements to voice. He was a bit more awake today.   He would not follow any commands consistently. He did seem to occasionally mimic. He did track me around the room today. Cranial nerves:   II-XII were tested    Perrrla  Visual fields -he does appear to blink to visual threat  Eomi, no evidence of nystagmus  Facial sensation:  normal and symmetric  Facial motor: Mild asymmetry  He would not protrude his tongue    Motor: Tone -diminished throughout  He would hold his left arm up for pronator drift testing. No spontaneous movement of the right upper extremity  No evidence of fasciculations    Strength testing:  He does have spontaneous movement seen in his left upper and lower extremity. Significant weakness noted on the right. Sensory:  Upper extremity: Grimace to painful stimulation, more so on the left  Lower extremity: grimace to painful stimulation, more so on the left. Reflexes:    Right Left  Biceps  2 2  Triceps     2 2  Brachiorad. 2 2  Patella  2 2  Achilles - -    Plantar response:  extensor bilaterally    Cerebellar testing:  no tremor apparent  Gait: not attempted due to mental status. Labs:     Lab Results   Component Value Date/Time    Sodium 138 09/03/2022 04:24 AM    Potassium 5.0 09/03/2022 04:24 AM    Chloride 105 09/03/2022 04:24 AM    Glucose 41 (LL) 09/03/2022 04:24 AM    BUN 16 09/03/2022 04:24 AM    Creatinine 2.26 (H) 09/03/2022 04:24 AM    Calcium 10.5 (H) 09/03/2022 04:24 AM    WBC 17.8 (H) 09/03/2022 04:24 AM    HCT 49.8 09/03/2022 04:24 AM    HGB 15.2 09/03/2022 04:24 AM    PLATELET 653 (L) 87/04/3427 04:24 AM       Imaging:    No results found for this or any previous visit. Results from [place] encounter on 09/02/22    CT ABD PELV WO CONT    Narrative  EXAM: CT ABD PELV WO CONT    INDICATION: lactic acidosis - evaluation for ischemia    COMPARISON: 4/14/2021 CT    IV CONTRAST: None. ORAL CONTRAST: None. TECHNIQUE:  Thin axial images were obtained through the abdomen and pelvis.  Coronal and  sagittal reformats were generated. CT dose reduction was achieved through use of  a standardized protocol tailored for this examination and automatic exposure  control for dose modulation. The absence of intravenous and oral contrast material reduces the past of CT to  evaluate the bowel, vasculature and solid organs. FINDINGS:  LOWER THORAX: Nonspecific patchy opacities in lower lungs bilaterally, greatest  in the basilar left lower lobe. Moderate cardiac contour enlargement again  shown. Artifacts related to AICD leads. Status post median sternotomy. LIVER: Morphology consistent with cirrhosis again demonstrated. No hepatic mass  or intrahepatic bile duct dilation demonstrated. Maria Eugenia Graven BILIARY TREE: Status post cholecystectomy. CBD is not dilated. SPLEEN: within normal limits. PANCREAS: No focal abnormality. ADRENALS: Unremarkable. KIDNEYS/URETERS: No calculus or hydronephrosis. STOMACH: Unremarkable. SMALL BOWEL: No evidence for small bowel distention or pneumatosis. There is a  loop of left mid abdominal small bowel with equivocal mural thickening. COLON: No distention. There is fecal material in the cecum and ascending colon  with numerous peripheral air lucencies in the colon, equivocal for pneumatosis. Air-fluid levels are shown within the sigmoid colon and rectum. APPENDIX: Not visualized. PERITONEUM: Small amount of perihepatic ascites again shown. RETROPERITONEUM: Abundant atherosclerotic calcifications. Distention of inferior  vena cava, especially hepatic portion. URINARY BLADDER: No mass or calculus. BONES: No destructive bone lesion. ABDOMINAL WALL: No mass or hernia. ADDITIONAL COMMENTS: N/A    Impression  1. Study without oral or IV contrast with resultant diminished assess stability  of bowel, vasculature and solid organs. 2. Moderate cardiomegaly with evidence for right heart failure and superior vena  cava passive venous congestion. 3. Cirrhosis. Perihepatic ascites.   4. Loop of left mid abdominal small bowel with equivocal mural thickening. 5. Equivocal pneumatosis in cecum. 6. Air-fluid levels within colon demonstrated distally. head CT from September 2, 2022. There was extensive encephalomalacia in the left posterior frontal parietal and temporal as well as a right posterior frontal lobe. Reportedly this is unchanged from a prior study in 2015. I spent  35   minutes providing care to this  acutely ill inpatient with > 50% of the time counseling and assisting in the coordination of care of the patient on the patient's hospital floor/unit.         Active Problems:    Seizure ([facility] [address] (9/3/2022)                 Signed By:  Sammie Smith DO FAAN    September 4, 2022

## 2022-09-04 NOTE — PROGRESS NOTES
Transition of Care Plan  RUR: 15%  DISPOSITION: The disposition plan is return to [facility]; placed call to facility to confirm patient can return, voice message was left with admissions  F/U with PCP/Specialist    Transport: [facility]      Reason for Admission:  seizures                      RUR Score:    15%              PCP: First and Last name:   Debra Arrington MD     Name of Practice:    Are you a current patient: Yes/No:    Approximate date of last visit:    Can you participate in a virtual visit if needed:     Do you (patient/family) have any concerns for transition/discharge?     none              Plan for utilizing home health:       Current Advanced Directive/Advance Care Plan:  Full Code      Healthcare Decision Maker:   Click here to complete [address] including selection of the Healthcare Decision Maker Relationship (ie \"Primary\")              Transition of Care Plan:          CRM spoke with patient's emergency contact, Gary Pepe, introduced self, explained role, verified demographics, and offered assistance. Patient lives at [address] [facility] Care Management Interventions  PCP Verified by CM:  Yes  Palliative Care Criteria Met (RRAT>21 & CHF Dx)?: No  Mode of Transport at Discharge: [facility]  Transition of Care Consult (CM Consult): Discharge Planning, [address] [facility] [address]: No  Discharge Durable Medical Equipment: No  Health Maintenance Reviewed: Yes  Physical Therapy Consult: No  Occupational Therapy Consult: No  Speech Therapy Consult: No  Support Systems: Other (Comment)David  Confirm Follow Up Transport: Other (see comment)  [address] Provided?: No  Discharge Location  Patient Expects to be Discharged to[de-identified] [address]      10:36 AM  DANIEL Jimenez

## 2022-09-05 ENCOUNTER — APPOINTMENT (OUTPATIENT)
Dept: NON INVASIVE DIAGNOSTICS | Age: 57
DRG: 871 | End: 2022-09-05
Attending: INTERNAL MEDICINE
Payer: MEDICARE

## 2022-09-05 LAB
ALBUMIN SERPL-MCNC: 3.5 G/DL (ref 3.5–5)
ALBUMIN/GLOB SERPL: 0.8 {RATIO} (ref 1.1–2.2)
ALP SERPL-CCNC: 225 U/L (ref 45–117)
ALT SERPL-CCNC: 1115 U/L (ref 12–78)
AMMONIA PLAS-SCNC: 30 UMOL/L
ANION GAP SERPL CALC-SCNC: 4 MMOL/L (ref 5–15)
APTT PPP: 40.9 SEC (ref 22.1–31)
APTT PPP: >130 SEC (ref 22.1–31)
AST SERPL-CCNC: 1252 U/L (ref 15–37)
BACTERIA SPEC CULT: NORMAL
BASOPHILS # BLD: 0.1 K/UL (ref 0–0.1)
BASOPHILS NFR BLD: 1 % (ref 0–1)
BILIRUB SERPL-MCNC: 4.3 MG/DL (ref 0.2–1)
BUN SERPL-MCNC: 31 MG/DL (ref 6–20)
BUN/CREAT SERPL: 13 (ref 12–20)
CALCIUM SERPL-MCNC: 9.2 MG/DL (ref 8.5–10.1)
CHLORIDE SERPL-SCNC: 113 MMOL/L (ref 97–108)
CHLORIDE UR-SCNC: 100 MMOL/L
CK SERPL-CCNC: 396 U/L (ref 39–308)
CO2 SERPL-SCNC: 26 MMOL/L (ref 21–32)
COMMENT, HOLDF: NORMAL
CREAT SERPL-MCNC: 2.45 MG/DL (ref 0.7–1.3)
CREAT UR-MCNC: 154 MG/DL
DATE LAST DOSE: ABNORMAL
DIFFERENTIAL METHOD BLD: ABNORMAL
ECHO AR MAX VEL PISA: 1.5 M/S
ECHO AV AREA PEAK VELOCITY: 3.2 CM2
ECHO AV AREA VTI: 4.9 CM2
ECHO AV AREA/BSA PEAK VELOCITY: 1.7 CM2/M2
ECHO AV AREA/BSA VTI: 2.6 CM2/M2
ECHO AV MEAN GRADIENT: 2 MMHG
ECHO AV MEAN VELOCITY: 0.7 M/S
ECHO AV PEAK GRADIENT: 3 MMHG
ECHO AV PEAK VELOCITY: 0.9 M/S
ECHO AV REGURGITANT PHT: 285.6 MILLISECOND
ECHO AV VELOCITY RATIO: 0.67
ECHO AV VTI: 9.2 CM
ECHO EST RA PRESSURE: 10 MMHG
ECHO LA DIAMETER INDEX: 2.41 CM/M2
ECHO LA DIAMETER: 4.5 CM
ECHO LA VOL 4C: 125 ML (ref 18–58)
ECHO LA VOLUME INDEX A4C: 67 ML/M2 (ref 16–34)
ECHO LV E' LATERAL VELOCITY: 6 CM/S
ECHO LV E' SEPTAL VELOCITY: 5 CM/S
ECHO LV EDV A4C: 79 ML
ECHO LV EDV INDEX A4C: 42 ML/M2
ECHO LV EJECTION FRACTION A4C: 38 %
ECHO LV ESV A4C: 49 ML
ECHO LV ESV INDEX A4C: 26 ML/M2
ECHO LV FRACTIONAL SHORTENING: 15 % (ref 28–44)
ECHO LV INTERNAL DIMENSION DIASTOLE INDEX: 2.19 CM/M2
ECHO LV INTERNAL DIMENSION DIASTOLIC: 4.1 CM (ref 4.2–5.9)
ECHO LV INTERNAL DIMENSION SYSTOLIC INDEX: 1.87 CM/M2
ECHO LV INTERNAL DIMENSION SYSTOLIC: 3.5 CM
ECHO LV IVSD: 1.7 CM (ref 0.6–1)
ECHO LV MASS 2D: 280.4 G (ref 88–224)
ECHO LV MASS INDEX 2D: 149.9 G/M2 (ref 49–115)
ECHO LV POSTERIOR WALL DIASTOLIC: 1.6 CM (ref 0.6–1)
ECHO LV RELATIVE WALL THICKNESS RATIO: 0.78
ECHO LVOT AREA: 4.9 CM2
ECHO LVOT AV VTI INDEX: 1.02
ECHO LVOT DIAM: 2.5 CM
ECHO LVOT MEAN GRADIENT: 1 MMHG
ECHO LVOT PEAK GRADIENT: 1 MMHG
ECHO LVOT PEAK VELOCITY: 0.6 M/S
ECHO LVOT STROKE VOLUME INDEX: 24.7 ML/M2
ECHO LVOT SV: 46.1 ML
ECHO LVOT VTI: 9.4 CM
ECHO MV A VELOCITY: 0.2 M/S
ECHO MV AREA PHT: 5.2 CM2
ECHO MV AREA VTI: 1 CM2
ECHO MV E DECELERATION TIME (DT): 150 MS
ECHO MV E VELOCITY: 1.14 M/S
ECHO MV E/A RATIO: 5.7
ECHO MV E/E' LATERAL: 19
ECHO MV E/E' RATIO (AVERAGED): 20.9
ECHO MV E/E' SEPTAL: 22.8
ECHO MV LVOT VTI INDEX: 4.93
ECHO MV MAX VELOCITY: 1.8 M/S
ECHO MV MEAN GRADIENT: 4 MMHG
ECHO MV MEAN VELOCITY: 0.8 M/S
ECHO MV PEAK GRADIENT: 13 MMHG
ECHO MV PRESSURE HALF TIME (PHT): 42.1 MS
ECHO MV REGURGITANT PEAK GRADIENT: 130 MMHG
ECHO MV REGURGITANT PEAK VELOCITY: 5.7 M/S
ECHO MV VTI: 46.3 CM
ECHO PV MAX VELOCITY: 0.6 M/S
ECHO PV PEAK GRADIENT: 1 MMHG
ECHO RIGHT VENTRICULAR SYSTOLIC PRESSURE (RVSP): 32 MMHG
ECHO RV FREE WALL PEAK S': 6 CM/S
ECHO RV TAPSE: 0.6 CM (ref 1.7–?)
ECHO TV REGURGITANT MAX VELOCITY: 2.36 M/S
ECHO TV REGURGITANT PEAK GRADIENT: 22 MMHG
EOSINOPHIL # BLD: 0 K/UL (ref 0–0.4)
EOSINOPHIL NFR BLD: 0 % (ref 0–7)
ERYTHROCYTE [DISTWIDTH] IN BLOOD BY AUTOMATED COUNT: 17.9 % (ref 11.5–14.5)
FIBRINOGEN PPP-MCNC: 270 MG/DL (ref 200–475)
GLOBULIN SER CALC-MCNC: 4.3 G/DL (ref 2–4)
GLUCOSE BLD STRIP.AUTO-MCNC: 100 MG/DL (ref 65–117)
GLUCOSE BLD STRIP.AUTO-MCNC: 102 MG/DL (ref 65–117)
GLUCOSE BLD STRIP.AUTO-MCNC: 107 MG/DL (ref 65–117)
GLUCOSE BLD STRIP.AUTO-MCNC: 109 MG/DL (ref 65–117)
GLUCOSE BLD STRIP.AUTO-MCNC: 127 MG/DL (ref 65–117)
GLUCOSE BLD STRIP.AUTO-MCNC: 91 MG/DL (ref 65–117)
GLUCOSE SERPL-MCNC: 92 MG/DL (ref 65–100)
HCT VFR BLD AUTO: 48.2 % (ref 36.6–50.3)
HGB BLD-MCNC: 15.1 G/DL (ref 12.1–17)
IMM GRANULOCYTES # BLD AUTO: 0.1 K/UL (ref 0–0.04)
IMM GRANULOCYTES NFR BLD AUTO: 1 % (ref 0–0.5)
INR PPP: 2.1 (ref 0.9–1.1)
INR PPP: 2.3 (ref 0.9–1.1)
LACTATE SERPL-SCNC: 1.7 MMOL/L (ref 0.4–2)
LYMPHOCYTES # BLD: 0.6 K/UL (ref 0.8–3.5)
LYMPHOCYTES NFR BLD: 5 % (ref 12–49)
MCH RBC QN AUTO: 23.4 PG (ref 26–34)
MCHC RBC AUTO-ENTMCNC: 31.3 G/DL (ref 30–36.5)
MCV RBC AUTO: 74.6 FL (ref 80–99)
MONOCYTES # BLD: 1.2 K/UL (ref 0–1)
MONOCYTES NFR BLD: 9 % (ref 5–13)
NEUTS SEG # BLD: 11 K/UL (ref 1.8–8)
NEUTS SEG NFR BLD: 85 % (ref 32–75)
NRBC # BLD: 0 K/UL (ref 0–0.01)
NRBC BLD-RTO: 0 PER 100 WBC
PLATELET # BLD AUTO: 109 K/UL (ref 150–400)
PMV BLD AUTO: ABNORMAL FL (ref 8.9–12.9)
POTASSIUM SERPL-SCNC: 3.7 MMOL/L (ref 3.5–5.1)
PROCALCITONIN SERPL-MCNC: 10.46 NG/ML
PROT SERPL-MCNC: 7.8 G/DL (ref 6.4–8.2)
PROTHROMBIN TIME: 21.3 SEC (ref 9–11.1)
PROTHROMBIN TIME: 23.1 SEC (ref 9–11.1)
RBC # BLD AUTO: 6.46 M/UL (ref 4.1–5.7)
REPORTED DOSE,DOSE: ABNORMAL UNITS
REPORTED DOSE/TIME,TMG: ABNORMAL
SAMPLES BEING HELD,HOLD: NORMAL
SERVICE CMNT-IMP: ABNORMAL
SERVICE CMNT-IMP: NORMAL
SODIUM SERPL-SCNC: 143 MMOL/L (ref 136–145)
SODIUM UR-SCNC: 39 MMOL/L
THERAPEUTIC RANGE,PTTT: ABNORMAL SECS (ref 58–77)
THERAPEUTIC RANGE,PTTT: ABNORMAL SECS (ref 58–77)
TROPONIN-HIGH SENSITIVITY: 106 NG/L (ref 0–76)
VANCOMYCIN TROUGH SERPL-MCNC: 14.5 UG/ML (ref 5–10)
WBC # BLD AUTO: 12.9 K/UL (ref 4.1–11.1)

## 2022-09-05 PROCEDURE — 84145 PROCALCITONIN (PCT): CPT

## 2022-09-05 PROCEDURE — 65270000046 HC RM TELEMETRY

## 2022-09-05 PROCEDURE — 83605 ASSAY OF LACTIC ACID: CPT

## 2022-09-05 PROCEDURE — 74011250636 HC RX REV CODE- 250/636: Performed by: INTERNAL MEDICINE

## 2022-09-05 PROCEDURE — 80053 COMPREHEN METABOLIC PANEL: CPT

## 2022-09-05 PROCEDURE — 36415 COLL VENOUS BLD VENIPUNCTURE: CPT

## 2022-09-05 PROCEDURE — 85610 PROTHROMBIN TIME: CPT

## 2022-09-05 PROCEDURE — 85384 FIBRINOGEN ACTIVITY: CPT

## 2022-09-05 PROCEDURE — 80202 ASSAY OF VANCOMYCIN: CPT

## 2022-09-05 PROCEDURE — 74011250637 HC RX REV CODE- 250/637: Performed by: INTERNAL MEDICINE

## 2022-09-05 PROCEDURE — 74011250636 HC RX REV CODE- 250/636: Performed by: STUDENT IN AN ORGANIZED HEALTH CARE EDUCATION/TRAINING PROGRAM

## 2022-09-05 PROCEDURE — 85730 THROMBOPLASTIN TIME PARTIAL: CPT

## 2022-09-05 PROCEDURE — 74011000258 HC RX REV CODE- 258: Performed by: INTERNAL MEDICINE

## 2022-09-05 PROCEDURE — 93306 TTE W/DOPPLER COMPLETE: CPT

## 2022-09-05 PROCEDURE — 74011250637 HC RX REV CODE- 250/637: Performed by: STUDENT IN AN ORGANIZED HEALTH CARE EDUCATION/TRAINING PROGRAM

## 2022-09-05 PROCEDURE — 82962 GLUCOSE BLOOD TEST: CPT

## 2022-09-05 PROCEDURE — 85025 COMPLETE CBC W/AUTO DIFF WBC: CPT

## 2022-09-05 PROCEDURE — 74011000258 HC RX REV CODE- 258: Performed by: STUDENT IN AN ORGANIZED HEALTH CARE EDUCATION/TRAINING PROGRAM

## 2022-09-05 PROCEDURE — 82570 ASSAY OF URINE CREATININE: CPT

## 2022-09-05 PROCEDURE — 84484 ASSAY OF TROPONIN QUANT: CPT

## 2022-09-05 PROCEDURE — 92610 EVALUATE SWALLOWING FUNCTION: CPT

## 2022-09-05 PROCEDURE — 82436 ASSAY OF URINE CHLORIDE: CPT

## 2022-09-05 PROCEDURE — 82140 ASSAY OF AMMONIA: CPT

## 2022-09-05 PROCEDURE — 74011000250 HC RX REV CODE- 250: Performed by: STUDENT IN AN ORGANIZED HEALTH CARE EDUCATION/TRAINING PROGRAM

## 2022-09-05 PROCEDURE — 82550 ASSAY OF CK (CPK): CPT

## 2022-09-05 PROCEDURE — 84300 ASSAY OF URINE SODIUM: CPT

## 2022-09-05 RX ORDER — THIAMINE HYDROCHLORIDE 100 MG/ML
200 INJECTION, SOLUTION INTRAMUSCULAR; INTRAVENOUS DAILY
Status: DISCONTINUED | OUTPATIENT
Start: 2022-09-05 | End: 2022-09-05

## 2022-09-05 RX ORDER — LACTULOSE 10 G/15ML
200 SOLUTION ORAL; RECTAL 2 TIMES DAILY
Status: DISCONTINUED | OUTPATIENT
Start: 2022-09-05 | End: 2022-09-06

## 2022-09-05 RX ADMIN — LACTULOSE 300 ML: 10 SOLUTION ORAL at 06:23

## 2022-09-05 RX ADMIN — METRONIDAZOLE 500 MG: 500 INJECTION, SOLUTION INTRAVENOUS at 22:15

## 2022-09-05 RX ADMIN — LEVETIRACETAM 1000 MG: 100 INJECTION INTRAVENOUS at 22:14

## 2022-09-05 RX ADMIN — ACETAMINOPHEN 650 MG: 325 TABLET ORAL at 17:13

## 2022-09-05 RX ADMIN — SODIUM CHLORIDE, PRESERVATIVE FREE 10 ML: 5 INJECTION INTRAVENOUS at 14:13

## 2022-09-05 RX ADMIN — SODIUM CHLORIDE, PRESERVATIVE FREE 10 ML: 5 INJECTION INTRAVENOUS at 22:15

## 2022-09-05 RX ADMIN — DEXTROSE AND SODIUM CHLORIDE 75 ML/HR: 5; 900 INJECTION, SOLUTION INTRAVENOUS at 10:28

## 2022-09-05 RX ADMIN — LACTULOSE 300 ML: 10 SOLUTION ORAL at 18:10

## 2022-09-05 RX ADMIN — VANCOMYCIN HYDROCHLORIDE 1000 MG: 1 INJECTION, POWDER, LYOPHILIZED, FOR SOLUTION INTRAVENOUS at 10:19

## 2022-09-05 RX ADMIN — METRONIDAZOLE 500 MG: 500 INJECTION, SOLUTION INTRAVENOUS at 10:19

## 2022-09-05 RX ADMIN — LEVETIRACETAM 1000 MG: 100 INJECTION INTRAVENOUS at 08:31

## 2022-09-05 RX ADMIN — SODIUM CHLORIDE, PRESERVATIVE FREE 10 ML: 5 INJECTION INTRAVENOUS at 06:24

## 2022-09-05 RX ADMIN — SODIUM CHLORIDE 1 G: 900 INJECTION INTRAVENOUS at 06:23

## 2022-09-05 RX ADMIN — THIAMINE HYDROCHLORIDE 200 MG: 100 INJECTION, SOLUTION INTRAMUSCULAR; INTRAVENOUS at 10:20

## 2022-09-05 NOTE — CONSULTS
[address]  [facility], [address] [place] [address] NOTE          NAME:  Merritt Cruz   :   1965   MRN:   [id number]         Consult Date: 2022 1:14 PM    Referring provider: Bharathi Graham    Chief Complaint: altered mentation    History of Present Illness: 61 yo M w/ hx etoh cirrhosis, afib s/p Maze along with MVR, s/p PM, s/p CVA  c/b aphasia, presented w/ AMS of what was believed to be acute metabolic encephalopathy, GI consulted to assist in further eval & mgmt in the setting of new liver chemistry elevation in pattern most c/w ischemic hepatitis. - liver chemistries briskly rising  - Levetiracetam  - Abx  - PSA     PMH:  Past Medical History:   Diagnosis Date    Acid reflux     Alcohol abuse     Arthropathy, unspecified, site unspecified     Atrial fibrillation ([address]     Calcaneus fracture     Condyloma     Depression     Essential hypertension     H/O mitral valve repair     Along with MAZE procedure    High cholesterol     History of angina     Hypertension     IHSS (idiopathic hypertrophic subaortic stenosis) (HCC)     Ill-defined condition     pacemaker    Impotence of organic origin     LA thrombus 2015    LA and ARNULFO thrombus    Pacemaker     Thyroid disease     Trichilemmal cyst 4/10/2017    Unspecified cerebral artery occlusion with cerebral infarction        PSH:  Past Surgical History:   Procedure Laterality Date    HX APPENDECTOMY      HX CYST REMOVAL      HX HEART CATHETERIZATION      HX OTHER SURGICAL  2005    HX OTHER SURGICAL  2017    excision of scalp mass    HX PACEMAKER  2006    CT CARDIAC SURG PROCEDURE UNLIST  2006    Mitral Valve Replacement       Allergies: Allergies   Allergen Reactions    Bactrim [Sulfamethoxazole-Trimethoprim] Other (comments)     GI Distress       Home Medications:  Prior to Admission Medications   Prescriptions Last Dose Informant Patient Reported? Taking? FLUoxetine (PROZAC) 20 mg capsule   Yes No   Si mg by PEG Tube route daily. HYDROcodone-acetaminophen (NORCO) 5-325 mg per tablet   No No   Sig: Take 1 Tab by mouth every four (4) hours as needed for Pain. Max Daily Amount: 6 Tabs. Patient not taking: Reported on 2021   acetaminophen (Tylenol Extra Strength) 500 mg tablet   Yes No   Sig: Take  by mouth every six (6) hours as needed for Pain. atorvastatin (LIPITOR) 20 mg tablet   Yes No   Si mg by PEG Tube route daily. carvediloL (COREG) 6.25 mg tablet   Yes No   Si.25 mg by PEG Tube route two (2) times a day. dicyclomine (BENTYL) 20 mg tablet   Yes No   Si mg by PEG Tube route two (2) times daily as needed for Other (for IBS). furosemide (LASIX) 20 mg tablet   Yes No   Si mg by PEG Tube route daily. Patient not taking: Reported on 2021   lactose-reduced food/fiber (JEVITY 1.2 JODY PO)   Yes No   Si mL by PEG Tube route every four (4) hours. Patient not taking: Reported on 2021   levETIRAcetam (KEPPRA) 100 mg/mL solution   Yes No   Si.5 mL by PEG Tube route two (2) times a day. levothyroxine (SYNTHROID) 50 mcg tablet   Yes No   Si mcg by PEG Tube route Daily (before breakfast). lisinopriL (PRINIVIL, ZESTRIL) 5 mg tablet   Yes No   Si mg by PEG Tube route daily. Patient not taking: Reported on 2021   mupirocin (BACTROBAN) 2 % ointment   No No   Sig: Apply  to affected area three (3) times daily. Patient not taking: Reported on 2021   ondansetron (ZOFRAN ODT) 4 mg disintegrating tablet   No No   Sig: Take 1 Tab by mouth every eight (8) hours as needed for Nausea. Patient taking differently: 1 Tab by PEG Tube route two (2) times daily as needed for Nausea. rivaroxaban (XARELTO) 20 mg tab tablet   Yes No   Si mg by PEG Tube route daily. senna-docusate (Senna with Docusate Sodium) 8.6-50 mg per tablet   Yes No   Sig: Take 1 Tablet by mouth daily. Facility-Administered Medications: None       Hospital Medications:  Current Facility-Administered Medications   Medication Dose Route Frequency    lactulose (CHRONULAC) 10 gram/15 mL solution 300 mL  200 g Rectal BID    thiamine (B-1) 200 mg in 0.9% sodium chloride 50 mL IVPB  200 mg IntraVENous DAILY    metroNIDAZOLE (FLAGYL) IVPB premix 500 mg  500 mg IntraVENous Q12H    levETIRAcetam (KEPPRA) injection 1,000 mg  1,000 mg IntraVENous Q12H    sodium chloride (NS) flush 5-40 mL  5-40 mL IntraVENous Q8H    sodium chloride (NS) flush 5-40 mL  5-40 mL IntraVENous PRN    acetaminophen (TYLENOL) tablet 650 mg  650 mg Oral Q6H PRN    Or    acetaminophen (TYLENOL) suppository 650 mg  650 mg Rectal Q6H PRN    polyethylene glycol (MIRALAX) packet 17 g  17 g Oral DAILY PRN    ondansetron (ZOFRAN ODT) tablet 4 mg  4 mg Oral Q8H PRN    Or    ondansetron (ZOFRAN) injection 4 mg  4 mg IntraVENous Q6H PRN    cefTRIAXone (ROCEPHIN) 1 g in 0.9% sodium chloride (MBP/ADV) 50 mL MBP  1 g IntraVENous Q24H    dextrose 5% and 0.9% NaCl infusion  75 mL/hr IntraVENous CONTINUOUS    dextrose 10% infusion 0-250 mL  0-250 mL IntraVENous PRN    vancomycin (VANCOCIN) 1,000 mg in 0.9% sodium chloride 250 mL (Roju0Ehq)  1,000 mg IntraVENous Q24H       Social History:  Social History     Tobacco Use    Smoking status: Former     Types: Cigarettes     Quit date: 2020     Years since quittin.0    Smokeless tobacco: Never   Substance Use Topics    Alcohol use: Yes     Alcohol/week: 0.0 standard drinks     Types: 1 - 2 Cans of beer per week     Comment: occ       Family History:  No family history on file.     Review of Systems:  Constitutional: - fever, - chills, - weight loss  Eyes:   - visual changes  ENT:   - sore throat, - tongue or lip swelling  Respiratory:  - cough, - dyspnea  Cards:  - chest pain, - diaphoresis, - palpitations, - lower extremity edema  GI:   See HPI  :  - frequency, - dysuria  Integument:  - rash, - pruritus  Heme:  - easy bruising, - gum/nose bleeding  Musculoskel: - for myalgias,  - back pain, - muscle weakness  Neuro:  - headaches, - dizziness  Psych: - feelings of anxiety, - feelings of depression     Objective:   Patient Vitals for the past 8 hrs:   BP Temp Pulse Resp SpO2 Height Weight   09/05/22 1200 (!) 138/56 -- -- -- -- 5' 7\" (1.702 m) 75.3 kg (166 lb 0.1 oz)   09/05/22 1139 (!) 138/56 -- -- -- -- -- --   09/05/22 1052 (!) 154/105 99.1 °F (37.3 °C) 71 22 99 % -- --   09/05/22 1018 (!) 146/102 -- 76 25 100 % -- --   09/05/22 0943 -- -- 79 -- -- -- --   09/05/22 0744 (!) 154/80 97.5 °F (36.4 °C) 76 26 100 % -- --     No intake/output data recorded. 09/03 1901 - 09/05 0700  In: 3815.8 [I.V.:3815.8]  Out: 1400 [Urine:1400]    PHYSICAL EXAM:  General: Well appearing, cooperative, in no acute distress    HEENT: Atraumatic, Anicteric sclerae. Chest/ Lungs: Adequate air movement bilaterally, no overt wheezing, rhonchi or rales. Heart:  Regular rate & rhythm, no murmurs  Abdomen: Soft, Non distended, Non tender. +Bowel sounds  Extremities: No clubbing or cyanosis  Neurologic:  Grossly alert & oriented  Skin:   No jaundice, no rashes  Psych:   Appropriate mood and affect, appropriate insight     Data Review     Recent Labs     09/05/22 0010 09/04/22 0930   WBC 12.9* 14.9*   HGB 15.1 13.3   HCT 48.2 42.6   * 96*     Recent Labs     09/05/22 0010 09/04/22 0930    143   K 3.7 3.9   * 112*   CO2 26 25   BUN 31* 31*   CREA 2.45* 2.49*   GLU 92 112*   CA 9.2 8.6     Recent Labs     09/05/22 0010 09/04/22  0930   * 183*   TP 7.8 7.1   ALB 3.5 3.0*   GLOB 4.3* 4.1*     Recent Labs     09/05/22  0414 09/05/22  0010   INR 2.1* 2.3*   PTP 21.3* 23.1*   APTT 40.9* >130.0*       Radiology Review       IMPRESSION     1. Study without oral or IV contrast with resultant diminished assess stability  of bowel, vasculature and solid organs.   2. Moderate cardiomegaly with evidence for right heart failure and superior vena  cava passive venous congestion. 3. Cirrhosis. Perihepatic ascites. 4. Loop of left mid abdominal small bowel with equivocal mural thickening. 5. Equivocal pneumatosis in cecum. 6. Air-fluid levels within colon demonstrated distally.        Assessment:     61 yo M w/ hx etoh cirrhosis, afib s/p Maze along with MVR, s/p PM, s/p CVA  c/b aphasia, presented w/ AMS of what was believed to be acute metabolic encephalopathy, GI consulted to assist in further eval & mgmt in the setting of new liver chemistry elevation in pattern most c/w ischemic hepatitis v DILI  Difficult to tell what is going on with him at present- BP has remained about the same aside from one reading this afternoon looking a bit lower- HR being low may only be but so reassuring given CV hx, PM requirement, etc. May make him more susceptible to sudden changes in BP- substance abuse/ withdrawal is certainly one etiology of BP lability  Chemistries rising faster than I would expect for hepatic congestion- but evolving overt R sided CHF could explain some of these findings and would make forward flow highly susceptible to fluid shifts     Plan:     Abd US w/ dopplers to eval portal venous and hepatic venous system for evidence of thrombosis  Daily CMP & INR- watching for evidence of lactic acidosis  We will continue to follow clinically     Signed By: Christiano Dunaway MD     9/5/2022  1:14 PM

## 2022-09-05 NOTE — PROGRESS NOTES
Pharmacy Antimicrobial Kinetic Dosing    Indication for Antimicrobials: Sepsis     Current Regimen of Each Antimicrobial:  Vancomycin 1000 mg IV q24h (Start Date 9/3; Day 3)  Ceftriaxone 1 g IV q24h (Start Date 9/3; Day 3)  Metronidazole 500 mg IV q12h (Start Date ; Day 2)    Previous Antimicrobial Therapy:      Goal Level: AUC: 400-600 mg/hr/Liter/day    Date Dose & Interval Measured (mcg/mL) Predicted AUC/THU    1000 mg q24h 14.5 588                 Date & time of next level: TBD    Dosing calculator used: [employer] calculator    Significant Positive Cultures:     Conditions for Dosing Consideration: None    Labs:  Recent Labs     22  0010 22  0930 22  1401 22  0424   CREA 2.45* 2.49*  --  2.26*   BUN 31* 31*  --  16   PCT 10.46  --  18.95  --      Recent Labs     22  0010 22  0930 22  0424 22  2147   WBC 12.9* 14.9* 17.8* 14.4*     Temp (24hrs), Av °F (36.7 °C), Min:97.5 °F (36.4 °C), Max:98.4 °F (36.9 °C)        Creatinine Clearance (mL/min):   CrCl (Ideal Body Weight): 30.7   If actual weight < IBW: CrCl (Actual Body Weight) 35.0    Impression/Plan:   Vancomycin level of 14.5 is therapeutic but at top range of AUC target  Continue vancomycin 1000 mg q24h  May need to decrease dose if renal function decreases more  Antimicrobial stop date TBD     Pharmacy will follow daily and adjust medications as appropriate for renal function and/or serum levels.     Thank you,  Yulissa Soliman, ARELISD    Vancomycin Dosing Document    Documents located on pharmacy Teams site: Clinical Practice -> Antimicrobial Stewardship -> Antibiotics_Vancomycin     Aminoglycoside Dosing Document    Documents located on pharmacy Teams site: Clinical Practice -> Antimicrobial Stewardship -> Antibiotics_Aminoglycosides

## 2022-09-05 NOTE — PROGRESS NOTES
1957: Assessment completed at bedside. See flowsheet for details. 2000: Bedside and Verbal shift change report given to Marleny Calles RN (oncoming nurse) by Savi Ray RN (offgoing nurse). Report included the following information SBAR, Kardex, Intake/Output, MAR, Accordion, Recent Results, Med Rec Status, and Cardiac Rhythm Vpaced . 2300: Patient's BG 92.     2319: Scheduled meds given at this time. New bag of ordered IVF hung at this time. 8760 09/05/2022: Reassessment completed at bedside. See flowsheet for details. 8494: Patient has the following critical lab results:     Latest Reference Range & Units 9/5/22 00:10   INR 0.9 - 1.1   2.3 (H)   Prothrombin time 9.0 - 11.1 sec 23.1 (H)   aPTT 22.1 - 31.0 sec >130.0 (HH)           0319:     0322: Ana Knight MD notified of above if want a redraw or leave as is. Verbal readback provided. [id number] Albert MD held scheduled Lovenox at this time. [id number] Albert MD also would like lab recollect of PT/INR and PTT.     0406: Reassessment completed at bedside. See flowsheet for details. 0071: Repeat labs drawn and sent at this time. [id number] Barrientos MD of repeat labs. No new orders at this time. 7913: Scheduled meds given at this time. Lactulose administered at 0643.       0719: End of Shift Note    Bedside shift change report given to [facility], RN (oncoming nurse) by Marleny Calles (offgoing nurse). Report included the following information SBAR, Kardex, Intake/Output, MAR, Accordion, Recent Results, Med Rec Status, and Cardiac Rhythm Afib, V-paced    Shift worked:  0270-8449     Shift summary and any significant changes:     Acute changes documented above. Concerns for physician to address:  Acute changes documented above.         Activity:  Activity Level: Bed Rest  Number times ambulated in hallways past shift: 0  Number of times OOB to chair past shift: 0    Cardiac:   Cardiac Monitoring: Yes      Cardiac Rhythm: Atrial Fib, Ventricular Paced    Access:  Current line(s): PIV     Genitourinary:   Urinary status: incontinent and external catheter    Respiratory:   O2 Device: None (Room air)  Chronic home O2 use?: NO  Incentive spirometer at bedside: NO       GI:  Last Bowel Movement Date: 09/04/22  Current diet:  DIET NPO  Passing flatus: YES  Tolerating current diet: YES       Pain Management:   Patient states pain is manageable on current regimen: NO    Skin:  Ralph Score: 13  Interventions: float heels, PT/OT consult, internal/external urinary devices, and nutritional support     Patient Safety:  Fall Score:  Total Score: 4  Interventions: bed/chair alarm, gripper socks, and stay with me (per policy)  High Fall Risk: Yes    Length of Stay:  Expected LOS: - - -  Actual LOS: Walter Serrano

## 2022-09-05 NOTE — PROGRESS NOTES
Problem: Aspiration - Risk of  Goal: *Absence of aspiration  9/5/2022 0207 by Lynda Magdaleno  Outcome: Progressing Towards Goal  9/5/2022 0129 by Lynda Magdaleno  Outcome: Progressing Towards Goal     Problem: Patient Education: Go to Patient Education Activity  Goal: Patient/Family Education  9/5/2022 0207 by Lynda Magdaleno  Outcome: Progressing Towards Goal  9/5/2022 0129 by Lynda Magdaleno  Outcome: Progressing Towards Goal     Problem: Pressure Injury - Risk of  Goal: *Prevention of pressure injury  Description: Document Ralph Scale and appropriate interventions in the flowsheet. 9/5/2022 0207 by Lynda Magdaleno  Outcome: Progressing Towards Goal  Note: Pressure Injury Interventions:  Sensory Interventions: Assess changes in LOC, Check visual cues for pain, Discuss PT/OT consult with provider, Float heels, Keep linens dry and wrinkle-free, Maintain/enhance activity level, Minimize linen layers, Monitor skin under medical devices, Turn and reposition approx. every two hours (pillows and wedges if needed)    Moisture Interventions: Absorbent underpads, Apply protective barrier, creams and emollients, Check for incontinence Q2 hours and as needed, Internal/External urinary devices, Maintain skin hydration (lotion/cream), Minimize layers    Activity Interventions: Assess need for specialty bed, Increase time out of bed    Mobility Interventions: Assess need for specialty bed, Float heels, HOB 30 degrees or less, PT/OT evaluation, Turn and reposition approx.  every two hours(pillow and wedges)    Nutrition Interventions: Document food/fluid/supplement intake, Offer support with meals,snacks and hydration    Friction and Shear Interventions: Apply protective barrier, creams and emollients, HOB 30 degrees or less, Lift sheet, Lift team/patient mobility team, Minimize layers             9/5/2022 0129 by Lynda Magdaleno  Outcome: Progressing Towards Goal  Note: Pressure Injury Interventions:  Sensory Interventions: Assess changes in LOC, Assess need for specialty bed, Check visual cues for pain, Float heels, Keep linens dry and wrinkle-free, Maintain/enhance activity level, Minimize linen layers    Moisture Interventions: Absorbent underpads, Apply protective barrier, creams and emollients, Assess need for specialty bed, Check for incontinence Q2 hours and as needed, Internal/External urinary devices    Activity Interventions: Assess need for specialty bed, Increase time out of bed, Pressure redistribution bed/mattress(bed type)    Mobility Interventions: Assess need for specialty bed, Float heels, HOB 30 degrees or less, Turn and reposition approx. every two hours(pillow and wedges)    Nutrition Interventions: Document food/fluid/supplement intake, Offer support with meals,snacks and hydration    Friction and Shear Interventions: Apply protective barrier, creams and emollients, Feet elevated on foot rest, HOB 30 degrees or less, Minimize layers                Problem: Falls - Risk of  Goal: *Absence of Falls  Description: Document Kevyn Fall Risk and appropriate interventions in the flowsheet.   9/5/2022 0207 by Vance Gant  Outcome: Progressing Towards Goal  Note: Fall Risk Interventions:  Mobility Interventions: Assess mobility with egress test, Bed/chair exit alarm, Communicate number of staff needed for ambulation/transfer, Strengthening exercises (ROM-active/passive)    Mentation Interventions: Adequate sleep, hydration, pain control, Bed/chair exit alarm, Door open when patient unattended, Evaluate medications/consider consulting pharmacy, Reorient patient, More frequent rounding, Room close to nurse's station, Toileting rounds, Update white board    Medication Interventions: Assess postural VS orthostatic hypotension, Bed/chair exit alarm, Evaluate medications/consider consulting pharmacy    Elimination Interventions: Bed/chair exit alarm, Patient to call for help with toileting needs, Stay With Me (per policy) 9/5/2022 0129 by Becky Vang  Outcome: Progressing Towards Goal  Note: Fall Risk Interventions:       Mentation Interventions: Adequate sleep, hydration, pain control, Bed/chair exit alarm    Medication Interventions: Assess postural VS orthostatic hypotension, Bed/chair exit alarm, Patient to call before getting OOB, Teach patient to arise slowly    Elimination Interventions: Bed/chair exit alarm, Call light in reach

## 2022-09-05 NOTE — PROGRESS NOTES
Hospitalist Progress Note    NAME: Brian Dawkins   :  1965   MRN:  [id number]     Admit date: 2022    Today's date: 22    PCP: Odell Patrick MD    Anticipated discharge date: 2022    Barriers:  not medically stable    Assessment / Plan:    Tonic-clonic seizure POA no recurrence  Acute metabolic encephalopathy POA ? Post-ictal, ? Hepatic encephalopathy, ? Sepsis, hypoglycemia  Hypoglycemia glucose 23 on 9/3/2022 POA  Baseline aphasia and right hemiparesis secondary to old CVA POA  Leukocytosis POA elevated procalcitonin  18.95  Lactic acidosis lactate 7.0 POA   Abnormal LFTs , ,   Lactic acidosis may relate to seizure and poor hepatic clearance  Prior on seizure meds, not at admit  Now with recurrent seizure. Lethargic after admit, now starting to wake up  Arousable, still not consistently following commands  Known cirrhosis, ammonia elevated, ? Clinical significance   Improved with rectal lactulose  Continue keppra  EEG abnormal.   Mild to moderate generalized slowing as seen in encephalopathies. There is no focal asymmetry, seizures or epileptiform discharges seen. Elevated procalcitonin 18.95 suggests infection  CXR with no ASD  CT abdomen/pelvis IMPRESSION  1. Study without oral or IV contrast with resultant diminished assess stability  of bowel, vasculature and solid organs. 2. Moderate cardiomegaly with evidence for right heart failure and superior vena  cava passive venous congestion. 3. Cirrhosis. Perihepatic ascites. 4. Loop of left mid abdominal small bowel with equivocal mural thickening. 5. Equivocal pneumatosis in cecum. 6. Air-fluid levels within colon demonstrated distally  7.  ASD at [facility] negative  UA 10-20 WBC, 10-20 RBC, 1+ bacteria  Empiric vanco, cefepime, flagyl  BS dropped to 23 on 9/3/2022, ? stress and poor hepatic function   Resolved on D5W  IVF    Cirrhosis POA  Hyperammonemia NH4 113 --> 28 POA  Hyperbilirubinemia POA  Coagulopathy INR 1.7 POA  Alcohol abuse POA  Trace perihepatic ascites on CT scan  Other reasons to be altered, ? Significance of elevated ammonia  Serial levels, lactulose enemas, NH improved    Acute kidney injury POA creatinine 2.26  Last baseline 1 year ago was 1.62  IVF  Serial labs  Check urine NA  No hydronephrosis on the CT scan    Elevated troponin 299 in setting of sepsis, seizure  Atrial fibrillation  Essential hypertension  Idiopathic hypertrophic subaortic stenosis  Status post PPM-unclear indication (tachybradycardia syndrome?)  Check echo  Ask cars to see in AM     Code Status: Full     DVT Prophylaxis: Lovenox  GI Prophylaxis: not indicated  Diet: N.p.o.    Body mass index is 26 kg/m². Subjective:     Chief Complaint / Reason for Physician Visit   Discussed with RN events overnight. Baseline non verbal, opening eyes to voice, more responsive  Not able to provide any history    Review of Systems:  Symptom Y/N Comments  Symptom Y/N Comments   Fever/Chills    Chest Pain     Poor Appetite    Edema     Cough    Abdominal Pain     Sputum    Joint Pain     SOB/CARDOZA    Headache     Nausea/vomit    Tolerating PT/OT     Diarrhea    Tolerating Diet     Constipation    Other       Could NOT obtain due to: Non verbal     Objective:     VITALS:   Last 24hrs VS reviewed since prior progress note.  Most recent are:  Patient Vitals for the past 24 hrs:   Temp Pulse Resp BP SpO2   09/04/22 1553 98 °F (36.7 °C) 70 25 137/88 94 %   09/04/22 1137 97.5 °F (36.4 °C) 73 22 138/86 100 %   09/04/22 0800 98.1 °F (36.7 °C) 74 22 127/88 95 %   09/04/22 0400 98 °F (36.7 °C) 70 24 125/83 100 %   09/04/22 0158 97.8 °F (36.6 °C) 71 18 117/76 99 %   09/04/22 0000 -- 70 -- -- --   09/03/22 2358 98.1 °F (36.7 °C) 70 20 (!) 124/90 99 %         Intake/Output Summary (Last 24 hours) at 9/4/2022 2229  Last data filed at 9/4/2022 1553  Gross per 24 hour   Intake --   Output 750 ml   Net -750 ml          Wt Readings from Last 12 Encounters:   09/04/22 75.3 kg (166 lb 0.1 oz)   09/21/21 77.5 kg (170 lb 12.8 oz)   08/13/21 76.7 kg (169 lb)   05/01/17 81.6 kg (180 lb)   04/20/17 81.6 kg (180 lb)   04/14/17 81.6 kg (180 lb)   04/10/17 82.6 kg (182 lb)   06/26/16 89 kg (196 lb 3.4 oz)   10/12/15 86.2 kg (190 lb)   08/15/15 93.8 kg (206 lb 12.8 oz)   06/30/15 90.7 kg (200 lb)   05/15/15 91.6 kg (201 lb 14.4 oz)       PHYSICAL EXAM:    I had a face to face encounter and independently examined this patient on 09/04/22 as outlined below:    General: WD, WN. Lethargic, no seizure activity,, no acute distress    EENT:  PERRL. Anicteric sclerae. MMM  Resp:  CTA bilaterally, no wheezing or rales. No accessory muscle use  CV:  Regular  rhythm,  No edema  GI:  Soft, Non distended, Non tender. +Bowel sounds, no rebound  Neurologic:  Lethargic, opens eyes when stimulated, not following commands  Psych:   Not anxious nor agitated  Skin:  No rashes. No jaundice    Reviewed most current lab test results and cultures  YES  Reviewed most current radiology test results   YES  Review and summation of old records today    NO  Reviewed patient's current orders and MAR    YES  PMH/ reviewed - no change compared to H&P  ________________________________________________________________________  Care Plan discussed with:    Comments   Patient x    Family      RN x    Care Manager     Consultant                        Multidiciplinary team rounds were held today with , nursing, pharmacist and clinical coordinator. Patient's plan of care was discussed; medications were reviewed and discharge planning was addressed.      ________________________________________________________________________      Comments   >50% of visit spent in counseling and coordination of care     ________________________________________________________________________  Nathaniel Colón MD     Procedures: see electronic medical records for all procedures/Xrays and details which were not copied into this note but were reviewed prior to creation of Plan. LABS:  I reviewed today's most current labs and imaging studies.   Pertinent labs include:  Recent Labs     09/04/22 0930 09/03/22 0424 09/02/22  2147   WBC 14.9* 17.8* 14.4*   HGB 13.3 15.2 14.7   HCT 42.6 49.8 49.3   PLT 96* 114* 114*       Recent Labs     09/04/22 0930 09/03/22 0424 09/02/22  2217    138 134*   K 3.9 5.0 5.4*   * 105 101   CO2 25 20* 22   * 41* 111*   BUN 31* 16 15   CREA 2.49* 2.26* 2.29*   CA 8.6 10.5* 10.1   ALB 3.0* 4.1 4.3   TBILI 2.6* 3.7* 3.5*   * 63 36

## 2022-09-05 NOTE — PROGRESS NOTES
Hospitalist Progress Note    NAME: Tammy Blake   :  1965   MRN:  [id number]     Admit date: 2022    Today's date: 22    PCP: Nancy Palencia MD    Anticipated discharge date: 2022    Barriers:  not medically stable    Assessment / Plan:    Tonic-clonic seizure POA no recurrence  Acute metabolic encephalopathy POA ? Post-ictal, ? Hepatic encephalopathy, ? Sepsis, hypoglycemia  Hypoglycemia glucose 23 on 9/3/2022 POA resolved  Baseline aphasia and right hemiparesis secondary to old CVA POA  Leukocytosis POA elevated procalcitonin  18.95  Lactic acidosis lactate 7.0 POA   Abnormal LFTs increasing AST 1252, ALT 1115, alk phos 225  Lactic acidosis may relate to seizure and poor hepatic clearance  Prior on seizure meds, not at admit  Now with recurrent seizure. Lethargic after admit, now starting to wake up, try to talk but aphasic  Definitely improved from admit  Known cirrhosis, ammonia elevated, ? Clinical significance   Improved with rectal lactulose, reduce to BID  Continue keppra  EEG abnormal.   Mild to moderate generalized slowing as seen in encephalopathies. There is no focal asymmetry, seizures or epileptiform discharges seen. Elevated procalcitonin 18.95 suggests infection  CXR with no ASD  CT abdomen/pelvis IMPRESSION  1. Study without oral or IV contrast with resultant diminished assess stability  of bowel, vasculature and solid organs. 2. Moderate cardiomegaly with evidence for right heart failure and superior vena  cava passive venous congestion. 3. Cirrhosis. Perihepatic ascites. 4. Loop of left mid abdominal small bowel with equivocal mural thickening. 5. Equivocal pneumatosis in cecum. 6. Air-fluid levels within colon demonstrated distally  7.  ASD at [facility] negative  UA 10-20 WBC, 10-20 RBC, 1+ bacteria  Empiric vanco, cefepime, flagyl  BS dropped to 23 on 9/3/2022, ? stress and poor hepatic function   Resolved on D5W  IVF  Not sure why the LFTs are increasing, asked GI to see    Cirrhosis POA  Hyperammonemia NH4 113 --> 28 POA  Hyperbilirubinemia POA  Coagulopathy INR 1.7 POA  Alcohol abuse POA  Trace perihepatic ascites on CT scan  Serial levels, lactulose enemas, NH improved  Lactulose improved, reduce lactulose enemas to BID   Change to PO lactulose once on diet    Acute kidney injury POA creatinine 2.26  Last baseline 1 year ago was 1.62  IVF  Serial labs  Check urine NA and creat  No hydronephrosis on the CT scan    Elevated troponin 299 in setting of sepsis, seizure  Atrial fibrillation  Essential hypertension  Idiopathic hypertrophic subaortic stenosis  Status post PPM-unclear indication (tachybradycardia syndrome?)  Check echo  Troponin trending down     Code Status: Full     DVT Prophylaxis: Lovenox  GI Prophylaxis: not indicated  Diet: N.p.o.    Body mass index is 26 kg/m². Subjective:     Chief Complaint / Reason for Physician Visit   Discussed with RN events overnight. Baseline non verbal, opening eyes to voice, much more responsive   Trying to talk, but speech aphasic   Moving left side, but not right side  Not able to provide any history    Review of Systems:  Symptom Y/N Comments  Symptom Y/N Comments   Fever/Chills    Chest Pain     Poor Appetite    Edema     Cough    Abdominal Pain     Sputum    Joint Pain     SOB/CARDOZA    Headache     Nausea/vomit    Tolerating PT/OT     Diarrhea    Tolerating Diet     Constipation    Other       Could NOT obtain due to: Non verbal     Objective:     VITALS:   Last 24hrs VS reviewed since prior progress note.  Most recent are:  Patient Vitals for the past 24 hrs:   Temp Pulse Resp BP SpO2   09/05/22 1200 -- -- -- (!) 138/56 --   09/05/22 1139 -- -- -- (!) 138/56 --   09/05/22 1052 99.1 °F (37.3 °C) 71 22 (!) 154/105 99 %   09/05/22 1018 -- 76 25 (!) 146/102 100 %   09/05/22 0943 -- 79 -- -- --   09/05/22 0744 97.5 °F (36.4 °C) 76 26 (!) 154/80 100 %   09/05/22 0406 97.9 °F (36.6 °C) 71 28 (!) 136/92 97 % 09/05/22 0027 98.4 °F (36.9 °C) 76 22 126/87 99 %   09/04/22 1957 98.4 °F (36.9 °C) 74 (!) 31 (!) 164/105 91 %   09/04/22 1553 98 °F (36.7 °C) 70 25 137/88 94 %         Intake/Output Summary (Last 24 hours) at 9/5/2022 1327  Last data filed at 9/5/2022 0406  Gross per 24 hour   Intake 3815.84 ml   Output 950 ml   Net 2865.84 ml          Wt Readings from Last 12 Encounters:   09/05/22 75.3 kg (166 lb 0.1 oz)   09/21/21 77.5 kg (170 lb 12.8 oz)   08/13/21 76.7 kg (169 lb)   05/01/17 81.6 kg (180 lb)   04/20/17 81.6 kg (180 lb)   04/14/17 81.6 kg (180 lb)   04/10/17 82.6 kg (182 lb)   06/26/16 89 kg (196 lb 3.4 oz)   10/12/15 86.2 kg (190 lb)   08/15/15 93.8 kg (206 lb 12.8 oz)   06/30/15 90.7 kg (200 lb)   05/15/15 91.6 kg (201 lb 14.4 oz)       PHYSICAL EXAM:    I had a face to face encounter and independently examined this patient on 09/05/22 as outlined below:    General: WD, WN. Lethargic, no seizure activity,, no acute distress    EENT:  PERRL. Anicteric sclerae. MMM  Resp:  CTA bilaterally, no wheezing or rales. No accessory muscle use  CV:  Regular  rhythm,  No edema  GI:  Soft, Non distended, Non tender. +hypoactive Bowel sounds, no rebound  Neurologic:  Lethargic, opens eyes when stimulated, not following commands  Psych:   Not anxious nor agitated  Skin:  No rashes. No jaundice    Reviewed most current lab test results and cultures  YES  Reviewed most current radiology test results   YES  Review and summation of old records today    NO  Reviewed patient's current orders and MAR    YES  PMH/SH reviewed - no change compared to H&P  ________________________________________________________________________  Care Plan discussed with:    Comments   Patient x    Family      RN x    Care Manager     Consultant                        Multidiciplinary team rounds were held today with , nursing, pharmacist and clinical coordinator.   Patient's plan of care was discussed; medications were reviewed and discharge planning was addressed. ________________________________________________________________________      Comments   >50% of visit spent in counseling and coordination of care     ________________________________________________________________________  Kentrell Andino MD     Procedures: see electronic medical records for all procedures/Xrays and details which were not copied into this note but were reviewed prior to creation of Plan. LABS:  I reviewed today's most current labs and imaging studies.   Pertinent labs include:  Recent Labs     09/05/22 0010 09/04/22 0930 09/03/22 0424   WBC 12.9* 14.9* 17.8*   HGB 15.1 13.3 15.2   HCT 48.2 42.6 49.8   * 96* 114*       Recent Labs     09/05/22  0414 09/05/22 0010 09/04/22 0930 09/03/22  0424   NA  --  143 143 138   K  --  3.7 3.9 5.0   CL  --  113* 112* 105   CO2  --  26 25 20*   GLU  --  92 112* 41*   BUN  --  31* 31* 16   CREA  --  2.45* 2.49* 2.26*   CA  --  9.2 8.6 10.5*   ALB  --  3.5 3.0* 4.1   TBILI  --  4.3* 2.6* 3.7*   ALT  --  1,115* 492* 63   INR 2.1* 2.3*  --   --

## 2022-09-05 NOTE — PROGRESS NOTES
Problem: Dysphagia (Adult)  Goal: *Acute Goals and Plan of Care (Insert Text)  Description: Speech pathology goals  Initiated 9/5/2022  1. Patient will tolerate puree/thin liquid diet with no overt s/s aspiration or adverse effects within 7 days  Outcome: Progressing Towards Goal     SPEECH LANGUAGE PATHOLOGY BEDSIDE SWALLOW EVALUATION  Patient: Varsha Cruz (78 y.o. male)  Date: 9/5/2022  Primary Diagnosis: Seizure ([facility] [address] [R56.9]       Precautions:        ASSESSMENT :  Based on the objective data described below, the patient presents with moderate oral and no pharyngeal dysphagia, and suspect this is baseline from past CVA. Patient did have prolonged oral holding that improved with larger bolus sizes. No overt s/s aspiration observed, and oral holding improved as trials progressed. Patient is at risk for aspiration secondary to severity of baseline oral dysphagia. Patient will benefit from skilled intervention to address the above impairments. Patients rehabilitation potential is considered to be Fair     PLAN :  Recommendations and Planned Interventions:  -Puree/thin liquid diet, 1:1 feeding assistance and supervision, use straws, large bites/sips (suspect patient needs increased sensory input of large bolus for timelier swallow), upright for all PO intake  -Meds crushed  -SLP to follow for diet tolerance  Frequency/Duration: Patient will be followed by speech-language pathology 2 times a week to address goals.   Discharge Recommendations: None     SUBJECTIVE:   Patient nonverbal.    OBJECTIVE:     Past Medical History:   Diagnosis Date    Acid reflux     Alcohol abuse     Arthropathy, unspecified, site unspecified     Atrial fibrillation ([facility] [address]     Calcaneus fracture     Condyloma     Depression     Essential hypertension     H/O mitral valve repair 2006    Along with MAZE procedure    High cholesterol     History of angina     Hypertension     IHSS (idiopathic hypertrophic subaortic stenosis) ([facility] [address] Ill-defined condition     pacemaker    Impotence of organic origin     LA thrombus 05/2015    LA and ARNULFO thrombus    Pacemaker 2006    Thyroid disease     Trichilemmal cyst 4/10/2017    Unspecified cerebral artery occlusion with cerebral infarction      Past Surgical History:   Procedure Laterality Date    HX APPENDECTOMY      HX CYST REMOVAL      HX HEART CATHETERIZATION      HX OTHER SURGICAL  4/2005    HX OTHER SURGICAL  04/20/2017    excision of scalp mass    HX PACEMAKER  07/14/2006    [place] CARDIAC SURG PROCEDURE UNLIST  07/13/2006    Mitral Valve Replacement     Prior Level of Function/Home Situation:   Home Situation  Support Systems: Other (Comment), David  Patient Expects to be Discharged to[de-identified] [facility]  Diet prior to admission: unknown as limited records obtained from [facility], however puree/thin during prior admission in 2015  Current Diet:  NPO   Cognitive and Communication Status:  Neurologic State: Alert  Orientation Level: Unable to verbalize  Cognition: Decreased command following           Oral Assessment:     P.O. Trials:  Patient Position: upright in bed  Vocal quality prior to P.O.: Other (comment) (no vocalizations)  Consistency Presented: Ice chips; Thin liquid;Puree  How Presented: SLP-fed/presented;Spoon;Straw     Bolus Acceptance: Impaired (initially, however improved as trials progressed)  Bolus Formation/Control: Impaired  Type of Impairment: Delayed; Other (comment) (oral holding, worse with small boluses)  Propulsion: Delayed (# of seconds)  Oral Residue: None        Aspiration Signs/Symptoms: None  Pharyngeal Phase Characteristics: No impairment, issues, or problems              Oral Phase Severity: Moderate  Pharyngeal Phase Severity : No impairment    NOMS:   The NOMS functional outcome measure was used to quantify this patient's level of swallowing impairment.   Based on the NOMS, the patient was determined to be at level 3 for swallow function       NOMS Swallowing Levels:  Level 1 (CN): NPO  Level 2 (CM): NPO but takes consistency in therapy  Level 3 (CL): Takes less than 50% of nutrition p.o. and continues with nonoral feedings; and/or safe with mod cues; and/or max diet restriction  Level 4 (CK): Safe swallow but needs mod cues; and/or mod diet restriction; and/or still requires some nonoral feeding/supplements  Level 5 (CJ): Safe swallow with min diet restriction; and/or needs min cues  Level 6 (CI): Independent with p.o.; rare cues; usually self cues; may need to avoid some foods or needs extra time  Level 7 ([address] [place] [address]): Independent for all p.o.  RASHEED. (2003). National Outcomes Measurement System (NOMS): Adult Speech-Language Pathology User's Guide. Pain:  Pain Scale 1: FLACC          After treatment:   Patient left in no apparent distress in bed, Call bell within reach, and Nursing notified    COMMUNICATION/EDUCATION:   Patient was too confused to benefit from education. The patient's plan of care including recommendations, planned interventions, and recommended diet changes were discussed with: Registered nurse. Patient is unable to participate in goal setting and plan of care.     Thank you for this referral.  KATHLEEN [place]  Time Calculation: 20 mins

## 2022-09-05 NOTE — PROGRESS NOTES
End of Shift Note    Bedside shift change report given to Luis Good RN (oncoming nurse) by Gaviota Heller RN (offgoing nurse). Report included the following information SBAR, Kardex, Procedure Summary, Recent Results, Cardiac Rhythm Afib, V paced, and Quality Measures    Shift worked:  [id number]     Shift summary and any significant changes:    Speech eval completed  Dysphasia purred diet with feeding assistance all time  Family updated pt had stroke back in 2012 that affected right side  PRN Tylenol given x1 for low grade fever     Activity:  Activity Level: Bed Rest  Number times ambulated in hallways past shift: 0  Number of times OOB to chair past shift: 0    Cardiac:   Cardiac Monitoring: Yes      Cardiac Rhythm: Atrial Fib, Ventricular Paced    Access:  Current line(s): PIV     Genitourinary:   Urinary status: incontinent    Respiratory:   O2 Device: None (Room air)  Chronic home O2 use?: NO  Incentive spirometer at bedside: NO       GI:  Last Bowel Movement Date: 09/05/22  Current diet:  ADULT DIET Dysphagia - Pureed  Passing flatus: YES  Tolerating current diet: YES       Pain Management:   Patient states pain is manageable on current regimen: YES    Skin:  Ralph Score: 13  Interventions: turn team, speciality bed, PT/OT consult, and internal/external urinary devices    Patient Safety:  Fall Score:  Total Score: 3  Interventions: bed/chair alarm and tele sitter   High Fall Risk: Yes    Length of Stay:  Expected LOS: - - -  Actual LOS: 2      Gaviota Heller RN

## 2022-09-06 ENCOUNTER — APPOINTMENT (OUTPATIENT)
Dept: ULTRASOUND IMAGING | Age: 57
DRG: 871 | End: 2022-09-06
Attending: INTERNAL MEDICINE
Payer: MEDICARE

## 2022-09-06 LAB
ALBUMIN SERPL-MCNC: 3 G/DL (ref 3.5–5)
ALBUMIN/GLOB SERPL: 0.8 {RATIO} (ref 1.1–2.2)
ALP SERPL-CCNC: 189 U/L (ref 45–117)
ALT SERPL-CCNC: 1977 U/L (ref 12–78)
ANION GAP SERPL CALC-SCNC: 3 MMOL/L (ref 5–15)
AST SERPL-CCNC: 1752 U/L (ref 15–37)
BASOPHILS # BLD: 0 K/UL (ref 0–0.1)
BASOPHILS NFR BLD: 0 % (ref 0–1)
BILIRUB SERPL-MCNC: 5 MG/DL (ref 0.2–1)
BUN SERPL-MCNC: 28 MG/DL (ref 6–20)
BUN/CREAT SERPL: 13 (ref 12–20)
CALCIUM SERPL-MCNC: 8.6 MG/DL (ref 8.5–10.1)
CHLORIDE SERPL-SCNC: 119 MMOL/L (ref 97–108)
CO2 SERPL-SCNC: 25 MMOL/L (ref 21–32)
CREAT SERPL-MCNC: 2.16 MG/DL (ref 0.7–1.3)
DIFFERENTIAL METHOD BLD: ABNORMAL
EOSINOPHIL # BLD: 0 K/UL (ref 0–0.4)
EOSINOPHIL NFR BLD: 0 % (ref 0–7)
ERYTHROCYTE [DISTWIDTH] IN BLOOD BY AUTOMATED COUNT: 17.6 % (ref 11.5–14.5)
GLOBULIN SER CALC-MCNC: 4 G/DL (ref 2–4)
GLUCOSE BLD STRIP.AUTO-MCNC: 103 MG/DL (ref 65–117)
GLUCOSE BLD STRIP.AUTO-MCNC: 106 MG/DL (ref 65–117)
GLUCOSE BLD STRIP.AUTO-MCNC: 112 MG/DL (ref 65–117)
GLUCOSE BLD STRIP.AUTO-MCNC: 86 MG/DL (ref 65–117)
GLUCOSE BLD STRIP.AUTO-MCNC: 96 MG/DL (ref 65–117)
GLUCOSE SERPL-MCNC: 123 MG/DL (ref 65–100)
HAV IGM SER QL: NONREACTIVE
HBV CORE IGM SER QL: NONREACTIVE
HBV SURFACE AG SER QL: <0.1 INDEX
HBV SURFACE AG SER QL: NEGATIVE
HCT VFR BLD AUTO: 42.2 % (ref 36.6–50.3)
HCV AB SERPL QL IA: NONREACTIVE
HGB BLD-MCNC: 13.4 G/DL (ref 12.1–17)
IMM GRANULOCYTES # BLD AUTO: 0.1 K/UL (ref 0–0.04)
IMM GRANULOCYTES NFR BLD AUTO: 1 % (ref 0–0.5)
LYMPHOCYTES # BLD: 1 K/UL (ref 0.8–3.5)
LYMPHOCYTES NFR BLD: 10 % (ref 12–49)
MAGNESIUM SERPL-MCNC: 1.7 MG/DL (ref 1.6–2.4)
MCH RBC QN AUTO: 23.4 PG (ref 26–34)
MCHC RBC AUTO-ENTMCNC: 31.8 G/DL (ref 30–36.5)
MCV RBC AUTO: 73.8 FL (ref 80–99)
MONOCYTES # BLD: 1.3 K/UL (ref 0–1)
MONOCYTES NFR BLD: 13 % (ref 5–13)
NEUTS SEG # BLD: 7.6 K/UL (ref 1.8–8)
NEUTS SEG NFR BLD: 75 % (ref 32–75)
NRBC # BLD: 0.02 K/UL (ref 0–0.01)
NRBC BLD-RTO: 0.2 PER 100 WBC
PLATELET # BLD AUTO: 93 K/UL (ref 150–400)
PMV BLD AUTO: ABNORMAL FL (ref 8.9–12.9)
POTASSIUM SERPL-SCNC: 3.5 MMOL/L (ref 3.5–5.1)
PROT SERPL-MCNC: 7 G/DL (ref 6.4–8.2)
RBC # BLD AUTO: 5.72 M/UL (ref 4.1–5.7)
SERVICE CMNT-IMP: NORMAL
SODIUM SERPL-SCNC: 147 MMOL/L (ref 136–145)
SP1: NORMAL
SP2: NORMAL
SP3: NORMAL
WBC # BLD AUTO: 10 K/UL (ref 4.1–11.1)

## 2022-09-06 PROCEDURE — 92526 ORAL FUNCTION THERAPY: CPT

## 2022-09-06 PROCEDURE — 74011250636 HC RX REV CODE- 250/636: Performed by: STUDENT IN AN ORGANIZED HEALTH CARE EDUCATION/TRAINING PROGRAM

## 2022-09-06 PROCEDURE — 80074 ACUTE HEPATITIS PANEL: CPT

## 2022-09-06 PROCEDURE — 74011250636 HC RX REV CODE- 250/636: Performed by: HOSPITALIST

## 2022-09-06 PROCEDURE — 74011250637 HC RX REV CODE- 250/637: Performed by: INTERNAL MEDICINE

## 2022-09-06 PROCEDURE — 82962 GLUCOSE BLOOD TEST: CPT

## 2022-09-06 PROCEDURE — 74011000250 HC RX REV CODE- 250: Performed by: STUDENT IN AN ORGANIZED HEALTH CARE EDUCATION/TRAINING PROGRAM

## 2022-09-06 PROCEDURE — 83735 ASSAY OF MAGNESIUM: CPT

## 2022-09-06 PROCEDURE — 74011000258 HC RX REV CODE- 258: Performed by: STUDENT IN AN ORGANIZED HEALTH CARE EDUCATION/TRAINING PROGRAM

## 2022-09-06 PROCEDURE — 36415 COLL VENOUS BLD VENIPUNCTURE: CPT

## 2022-09-06 PROCEDURE — 74011000258 HC RX REV CODE- 258: Performed by: INTERNAL MEDICINE

## 2022-09-06 PROCEDURE — 80053 COMPREHEN METABOLIC PANEL: CPT

## 2022-09-06 PROCEDURE — 74011000250 HC RX REV CODE- 250: Performed by: INTERNAL MEDICINE

## 2022-09-06 PROCEDURE — 76705 ECHO EXAM OF ABDOMEN: CPT

## 2022-09-06 PROCEDURE — 74011250636 HC RX REV CODE- 250/636: Performed by: INTERNAL MEDICINE

## 2022-09-06 PROCEDURE — 65270000046 HC RM TELEMETRY

## 2022-09-06 PROCEDURE — 85025 COMPLETE CBC W/AUTO DIFF WBC: CPT

## 2022-09-06 RX ORDER — DEXTROSE MONOHYDRATE AND SODIUM CHLORIDE 5; .45 G/100ML; G/100ML
50 INJECTION, SOLUTION INTRAVENOUS CONTINUOUS
Status: DISPENSED | OUTPATIENT
Start: 2022-09-06 | End: 2022-09-07

## 2022-09-06 RX ORDER — LACTULOSE 10 G/15ML
200 SOLUTION ORAL; RECTAL 2 TIMES DAILY
Status: DISCONTINUED | OUTPATIENT
Start: 2022-09-06 | End: 2022-09-06

## 2022-09-06 RX ORDER — MAGNESIUM SULFATE 1 G/100ML
1 INJECTION INTRAVENOUS ONCE
Status: COMPLETED | OUTPATIENT
Start: 2022-09-06 | End: 2022-09-06

## 2022-09-06 RX ADMIN — MAGNESIUM SULFATE HEPTAHYDRATE 1 G: 1 INJECTION, SOLUTION INTRAVENOUS at 06:12

## 2022-09-06 RX ADMIN — METRONIDAZOLE 500 MG: 500 INJECTION, SOLUTION INTRAVENOUS at 10:40

## 2022-09-06 RX ADMIN — VANCOMYCIN HYDROCHLORIDE 1000 MG: 1 INJECTION, POWDER, LYOPHILIZED, FOR SOLUTION INTRAVENOUS at 10:41

## 2022-09-06 RX ADMIN — SODIUM CHLORIDE 1 G: 900 INJECTION INTRAVENOUS at 05:33

## 2022-09-06 RX ADMIN — DEXTROSE AND SODIUM CHLORIDE 75 ML/HR: 5; 900 INJECTION, SOLUTION INTRAVENOUS at 02:09

## 2022-09-06 RX ADMIN — THIAMINE HYDROCHLORIDE 200 MG: 100 INJECTION, SOLUTION INTRAMUSCULAR; INTRAVENOUS at 10:40

## 2022-09-06 RX ADMIN — LEVETIRACETAM 1000 MG: 100 INJECTION INTRAVENOUS at 21:43

## 2022-09-06 RX ADMIN — LEVETIRACETAM 1000 MG: 100 INJECTION INTRAVENOUS at 10:41

## 2022-09-06 RX ADMIN — SODIUM CHLORIDE, PRESERVATIVE FREE 10 ML: 5 INJECTION INTRAVENOUS at 05:33

## 2022-09-06 RX ADMIN — SODIUM CHLORIDE, PRESERVATIVE FREE 10 ML: 5 INJECTION INTRAVENOUS at 18:21

## 2022-09-06 RX ADMIN — DEXTROSE AND SODIUM CHLORIDE 50 ML/HR: 5; 450 INJECTION, SOLUTION INTRAVENOUS at 22:49

## 2022-09-06 RX ADMIN — LACTULOSE 45 ML: 20 SOLUTION ORAL at 18:20

## 2022-09-06 RX ADMIN — SODIUM CHLORIDE, PRESERVATIVE FREE 10 ML: 5 INJECTION INTRAVENOUS at 21:44

## 2022-09-06 RX ADMIN — METRONIDAZOLE 500 MG: 500 INJECTION, SOLUTION INTRAVENOUS at 22:49

## 2022-09-06 NOTE — PROGRESS NOTES
Problem: Dysphagia (Adult)  Goal: *Acute Goals and Plan of Care (Insert Text)  Description: Speech pathology goals  Initiated 9/5/2022  1. Patient will tolerate puree/thin liquid diet with no overt s/s aspiration or adverse effects within 7 days  Outcome: Progressing Towards Goal     SPEECH LANGUAGE PATHOLOGY DYSPHAGIA TREATMENT  Patient: Kj Mcqueen (62 y.o. male)  Date: 9/6/2022  Diagnosis: Seizure ([address] [R56.9] <principal problem not specified>      Precautions:       ASSESSMENT:  Patient with minimal intake per RN report, and patient only agreeable to 2 sips of thin liquid with max encouragement during SLP session. Oral/pharyngeal swallow function was timelier this date, and no overt s/s aspiration observed. However, patient refused all food. Called Сергей who reported patient's baseline diet was regular/thin, however patient with minimal intake at [place] as well. PLAN:  Recommendations and Planned Interventions:  -Continue puree/thin liquid diet  -Consider liquid oral nutrition supplements as patient refusing food  -SLP to follow for diet liberalization when intake improves  Patient continues to benefit from skilled intervention to address the above impairments. Continue treatment per established plan of care. Discharge Recommendations:  None     SUBJECTIVE:   Patient with no verbalizations. OBJECTIVE:   Cognitive and Communication Status:  Neurologic State: Alert  Orientation Level: Unable to verbalize  Cognition: Decreased attention/concentration, Decreased command following           Dysphagia Treatment:     P.O. Trials:  Patient Position: upright in bed  Vocal quality prior to P.O.: Other (comment) (no vocalizations)  Consistency Presented:  Thin liquid  How Presented: SLP-fed/presented;Self-fed/presented;Straw     Bolus Acceptance: No impairment  Bolus Formation/Control: No impairment     Propulsion: No impairment  Oral Residue: None        Aspiration Signs/Symptoms: None         Pain:  Pain Scale 1: FLACC          After treatment:   Patient left in no apparent distress in bed, Call bell within reach, and Nursing notified    COMMUNICATION/EDUCATION:   Patient was not receptive to education. The patient's plan of care including recommendations, planned interventions, and recommended diet changes were discussed with: Registered nurse.      Yennifer Calabrese SLP  Time Calculation: 10 mins

## 2022-09-06 NOTE — PROGRESS NOTES
1919: Bedside and Verbal shift change report given to Ady Lomeli RN (oncoming nurse) by Ainsley Watson RN (offgoing nurse). Report included the following information SBAR, Kardex, Intake/Output, MAR, Accordion, Recent Results, Med Rec Status, and Cardiac Rhythm Afib, V-Paced . 1926: Assessment completed at bedside. Incontinence care completed at bedside. 2214: Scheduled meds given at bedside. 0000 09/06/2022: Reassessment completed at bedside. See flowsheet for details. 0209: New bag of ordered IVF hung at this time. 2618: Patient provided incontinence care, CHG Bath at bedside. Labs drawn and sent at this time. 0335: Patient had 26 beats of VT at this time, sleeping. Stephanie Salcedo MD notified. Waiting on further recommendations at this time. [id number] Uriarte MD stated to continue to monitor. 0405Neli Uriarte MD placed orders for cardiology consult. [id number] Crawford MD of mag 1.7. Stephanie Salcedo MD placed orders for one time mag infusion at 0445 .    0533: Scheduled IV Rocephin given at this time. 0385: One time magnesium infusion given at this time. 9200:  End of Shift Note    Bedside shift change report given to Rony Dawkins RN (oncoming nurse) by Ady Lomeli (offgoing nurse). Report included the following information SBAR, Kardex, Intake/Output, MAR, Accordion, Recent Results, Med Rec Status, and Cardiac Rhythm Afib, V-paced    Shift worked:  1221-5537     Shift summary and any significant changes:     Acute changes documented at this time. Concerns for physician to address:  Acute changes documented at this time.       [facility]   [id number]       Activity:  Activity Level: Bed Rest  Number times ambulated in hallways past shift: 0  Number of times OOB to chair past shift: 0    Cardiac:   Cardiac Monitoring: Yes      Cardiac Rhythm: Atrial Fib, Ventricular Paced    Access:  Current line(s): PIV     Genitourinary:   Urinary status: incontinent    Respiratory:   O2 Device: None (Room air)  Chronic home O2 use?: NO  Incentive spirometer at bedside: NO       GI:  Last Bowel Movement Date: 09/05/22  Current diet:  ADULT DIET Dysphagia - Pureed  Passing flatus: YES  Tolerating current diet: NO       Pain Management:   Patient states pain is manageable on current regimen: N/A    Skin:  Ralph Score: 13  Interventions: speciality bed, float heels, internal/external urinary devices, and nutritional support     Patient Safety:  Fall Score:  Total Score: 4  Interventions: bed/chair alarm and stay with me (per policy)  High Fall Risk: Yes    Length of Stay:  Expected LOS: - - -  Actual LOS: [address]

## 2022-09-06 NOTE — PROGRESS NOTES
0730 - bedside report received, pt vss, resting comfortable  1100 - md made aware of pt sodium and poor po intake through perfect serve

## 2022-09-06 NOTE — PROGRESS NOTES
Consult seen, full note to follow. Admitted with a recent seizure. Also other relevant issues include a chronic nonischemic cardiomyopathy EF 15% on echo here (with biventricular failure), probably chronic atrial flutter, remote stroke with R-sided hemiparesis/aphasia, and remote pacemaker upgraded at some point to dual chamber ICD. Will interrogate the ICD to get more information.     Signed By: Tiffanie Malik MD     September 6, 2022

## 2022-09-06 NOTE — PROGRESS NOTES
GI PROGRESS NOTE  Jaden Angel NP  [phone] NP in-hospital cell phone M-F until 4:30  After 5pm or on weekends, please call  for physician on call    Jeronimo Persaud :1965 [id number]   ATTG: Dr. Ashtyn Arora  PCP: Heather Barnett MD  Date/Time:  2022 1:15 PM   Primary GI: Dr. Mariajose Lay; Dr. Cirilo Barros covering   Reason for following: Cirrhosis     Assessment:     ETOH cirrhosis   Metabolic encephalopathy   Plt 93  INR 2.1  T. Bili 5.0  ALT 1,977, AST 1,752,   CT abd/pel: Study without oral or IV contrast with resultant diminished assess stability of bowel, vasculature and solid organs. Moderate cardiomegaly with evidence for right heart failure and superior vena cava passive venous congestion. Cirrhosis. Perihepatic ascites. Loop of left mid abdominal small bowel with equivocal mural thickening. Equivocal pneumatosis in cecum. Air-fluid levels within colon demonstrated distally. Plan:     Elevated liver enzymes in pattern most consistent with ischemic hepatitis vs DILI   LFT's rising faster than would be expected from hepatic congestion but evolving overt right sided heart failure could explain some of these findings   Abdominal US with dopplers to evaluate portal venous and hepatic venous system for evidence of thrombosis is ordered   Follow LFT's   Avoid hepatoxic medications  Symptomatic care per primary team   *Plan of care discussed with Dr. Mello Lovett:   Discussed with RN events overnight. Patient resting in bed. Nursing at bedside. Patient non-verbal.     Review of Systems:  Symptom Y/N Comments  Symptom Y/N Comments   Fever/Chills    Chest Pain     Cough    Headaches     Sputum    Joint Pain     SOB/CARDOZA    Pruritis/Rash     Tolerating Diet    Other       Could NOT obtain due to: Non-verbal     Objective:   VITALS:   Last 24hrs VS reviewed since prior progress note.  Most recent are:  Visit Vitals  BP (!) 176/98   Pulse 72   Temp 97.2 °F (36.2 °C)   Resp 21   Ht 5' 7\" (1.702 m)   Wt 75.3 kg (166 lb 0.1 oz)   SpO2 99%   BMI 26.00 kg/m²       Intake/Output Summary (Last 24 hours) at 9/6/2022 1315  Last data filed at 9/6/2022 0425  Gross per 24 hour   Intake 1823.75 ml   Output 550 ml   Net 1273.75 ml     PHYSICAL EXAM:  General: Chronically ill-appearing AA male. NAD  HEENT: NC, Atraumatic. Anicteric sclerae. Lungs:  CTA Bilaterally. No Wheezing/Rhonchi/Rales. Heart:  Regular  rhythm,  No murmur, No Rubs, No Gallops  Abdomen: Soft, Non distended, Non tender. +Bowel sounds, no HSM  Extremities: No c/c/e  Neurologic:  Alert. No acute neurological distress   Psych:   Not anxious nor agitated. Lab and Radiology Data Reviewed: (see below)    Medications Reviewed: (see below)  PMH/SH reviewed - no change compared to H&P  ________________________________________________________________________  Total time spent with patient: 15 minutes ________________________________________________________________________  Care Plan discussed with:  Patient x   Family     RN x              Consultant:       Padmaja Boston NP     Procedures: see electronic medical records for all procedures/Xrays and details which were not copied into this note but were reviewed prior to creation of Plan. LABS:  Recent Labs     09/06/22 0242 09/05/22  0010   WBC 10.0 12.9*   HGB 13.4 15.1   HCT 42.2 48.2   PLT 93* 109*     Recent Labs     09/06/22 0242 09/05/22  0010 09/04/22  0930   * 143 143   K 3.5 3.7 3.9   * 113* 112*   CO2 25 26 25   BUN 28* 31* 31*   CREA 2.16* 2.45* 2.49*   * 92 112*   CA 8.6 9.2 8.6   MG 1.7  --   --      Recent Labs     09/06/22  0242 09/05/22  0010 09/04/22  0930   * 225* 183*   TP 7.0 7.8 7.1   ALB 3.0* 3.5 3.0*   GLOB 4.0 4.3* 4.1*     Recent Labs     09/05/22  0414 09/05/22  0010   INR 2.1* 2.3*   PTP 21.3* 23.1*   APTT 40.9* >130.0*      No results for input(s): FE, TIBC, PSAT, FERR in the last 72 hours.    No results found for: FOL, RBCF  No results for input(s): PH, PCO2, PO2 in the last 72 hours.   Recent Labs     09/05/22  0010 09/03/22  1401   * 306     Lab Results   Component Value Date/Time    Color DARK YELLOW 09/03/2022 10:01 PM    Appearance CLOUDY (A) 09/03/2022 10:01 PM    Specific gravity 1.019 09/03/2022 10:01 PM    Specific gravity <1.005 (L) 06/24/2016 03:14 PM    pH (UA) 5.5 09/03/2022 10:01 PM    Protein 100 (A) 09/03/2022 10:01 PM    Glucose 100 (A) 09/03/2022 10:01 PM    Ketone Negative 09/03/2022 10:01 PM    Bilirubin NEGATIVE  06/24/2016 03:14 PM    Urobilinogen 1.0 09/03/2022 10:01 PM    Nitrites Negative 09/03/2022 10:01 PM    Leukocyte Esterase SMALL (A) 09/03/2022 10:01 PM    Epithelial cells FEW 09/03/2022 10:01 PM    Bacteria 1+ (A) 09/03/2022 10:01 PM    WBC 10-20 09/03/2022 10:01 PM    RBC 10-20 09/03/2022 10:01 PM       MEDICATIONS:  Current Facility-Administered Medications   Medication Dose Route Frequency    lactulose (CHRONULAC) 10 gram/15 mL solution 45 mL  30 g Oral BID    thiamine (B-1) 200 mg in 0.9% sodium chloride 50 mL IVPB  200 mg IntraVENous DAILY    metroNIDAZOLE (FLAGYL) IVPB premix 500 mg  500 mg IntraVENous Q12H    levETIRAcetam (KEPPRA) injection 1,000 mg  1,000 mg IntraVENous Q12H    sodium chloride (NS) flush 5-40 mL  5-40 mL IntraVENous Q8H    sodium chloride (NS) flush 5-40 mL  5-40 mL IntraVENous PRN    acetaminophen (TYLENOL) tablet 650 mg  650 mg Oral Q6H PRN    Or    acetaminophen (TYLENOL) suppository 650 mg  650 mg Rectal Q6H PRN    polyethylene glycol (MIRALAX) packet 17 g  17 g Oral DAILY PRN    ondansetron (ZOFRAN ODT) tablet 4 mg  4 mg Oral Q8H PRN    Or    ondansetron (ZOFRAN) injection 4 mg  4 mg IntraVENous Q6H PRN    cefTRIAXone (ROCEPHIN) 1 g in 0.9% sodium chloride (MBP/ADV) 50 mL MBP  1 g IntraVENous Q24H    dextrose 5% and 0.9% NaCl infusion  75 mL/hr IntraVENous CONTINUOUS    dextrose 10% infusion 0-250 mL  0-250 mL IntraVENous PRN    vancomycin (VANCOCIN) 1,000 mg in 0.9% sodium chloride 250 mL ([id number])  1,000 mg IntraVENous Q24H

## 2022-09-06 NOTE — PROGRESS NOTES
Pt  had  26  beats of VT while sleeping. HD stable    Plan :  Cont tele  Check cardiac lytes  K /Mag  Card.  Consult ordered to see in am

## 2022-09-06 NOTE — PROGRESS NOTES
Problem: Aspiration - Risk of  Goal: *Absence of aspiration  Outcome: Progressing Towards Goal     Problem: Pressure Injury - Risk of  Goal: *Prevention of pressure injury  Description: Document Ralph Scale and appropriate interventions in the flowsheet. Outcome: Progressing Towards Goal  Note: Pressure Injury Interventions:  Sensory Interventions: Assess changes in LOC, Check visual cues for pain, Float heels, Keep linens dry and wrinkle-free, Maintain/enhance activity level, Minimize linen layers, Monitor skin under medical devices, Turn and reposition approx. every two hours (pillows and wedges if needed)    Moisture Interventions: Absorbent underpads, Internal/External urinary devices, Maintain skin hydration (lotion/cream), Minimize layers    Activity Interventions: Assess need for specialty bed, Increase time out of bed    Mobility Interventions: Assess need for specialty bed, Float heels, HOB 30 degrees or less, Turn and reposition approx. every two hours(pillow and wedges)    Nutrition Interventions: Document food/fluid/supplement intake, Offer support with meals,snacks and hydration    Friction and Shear Interventions: Apply protective barrier, creams and emollients, HOB 30 degrees or less, Lift sheet, Minimize layers                Problem: Falls - Risk of  Goal: *Absence of Falls  Description: Document Kevyn Fall Risk and appropriate interventions in the flowsheet.   Outcome: Progressing Towards Goal  Note: Fall Risk Interventions:  Mobility Interventions: Assess mobility with egress test, Bed/chair exit alarm, Communicate number of staff needed for ambulation/transfer    Mentation Interventions: Adequate sleep, hydration, pain control, Bed/chair exit alarm, Door open when patient unattended, Evaluate medications/consider consulting pharmacy, More frequent rounding, Reorient patient, Room close to nurse's station, Toileting rounds, Update white board    Medication Interventions: Assess postural VS orthostatic hypotension, Bed/chair exit alarm, Evaluate medications/consider consulting pharmacy    Elimination Interventions: Bed/chair exit alarm, Stay With Me (per policy)

## 2022-09-06 NOTE — PROGRESS NOTES
Consult received for VT. Pt appears to be following with [facility]. Will defer care to them.     9 Lacey Gil NP  9/6/2022  9:04 AM

## 2022-09-07 LAB
ALBUMIN SERPL-MCNC: 3 G/DL (ref 3.5–5)
ALBUMIN/GLOB SERPL: 0.7 {RATIO} (ref 1.1–2.2)
ALP SERPL-CCNC: 199 U/L (ref 45–117)
ALT SERPL-CCNC: 1456 U/L (ref 12–78)
AMMONIA PLAS-SCNC: 14 UMOL/L
ANION GAP SERPL CALC-SCNC: 3 MMOL/L (ref 5–15)
AST SERPL-CCNC: 759 U/L (ref 15–37)
BASOPHILS # BLD: 0 K/UL (ref 0–0.1)
BASOPHILS NFR BLD: 0 % (ref 0–1)
BILIRUB SERPL-MCNC: 5.1 MG/DL (ref 0.2–1)
BUN SERPL-MCNC: 25 MG/DL (ref 6–20)
BUN/CREAT SERPL: 14 (ref 12–20)
CALCIUM SERPL-MCNC: 8.7 MG/DL (ref 8.5–10.1)
CHLORIDE SERPL-SCNC: 119 MMOL/L (ref 97–108)
CO2 SERPL-SCNC: 26 MMOL/L (ref 21–32)
CREAT SERPL-MCNC: 1.85 MG/DL (ref 0.7–1.3)
DIFFERENTIAL METHOD BLD: ABNORMAL
EOSINOPHIL # BLD: 0.2 K/UL (ref 0–0.4)
EOSINOPHIL NFR BLD: 3 % (ref 0–7)
ERYTHROCYTE [DISTWIDTH] IN BLOOD BY AUTOMATED COUNT: 18.3 % (ref 11.5–14.5)
GLOBULIN SER CALC-MCNC: 4.3 G/DL (ref 2–4)
GLUCOSE BLD STRIP.AUTO-MCNC: 101 MG/DL (ref 65–117)
GLUCOSE BLD STRIP.AUTO-MCNC: 107 MG/DL (ref 65–117)
GLUCOSE BLD STRIP.AUTO-MCNC: 74 MG/DL (ref 65–117)
GLUCOSE BLD STRIP.AUTO-MCNC: 80 MG/DL (ref 65–117)
GLUCOSE BLD STRIP.AUTO-MCNC: 92 MG/DL (ref 65–117)
GLUCOSE SERPL-MCNC: 111 MG/DL (ref 65–100)
HCT VFR BLD AUTO: 44.2 % (ref 36.6–50.3)
HGB BLD-MCNC: 13.9 G/DL (ref 12.1–17)
IMM GRANULOCYTES # BLD AUTO: 0 K/UL (ref 0–0.04)
IMM GRANULOCYTES NFR BLD AUTO: 0 % (ref 0–0.5)
LYMPHOCYTES # BLD: 0.9 K/UL (ref 0.8–3.5)
LYMPHOCYTES NFR BLD: 12 % (ref 12–49)
MCH RBC QN AUTO: 23.1 PG (ref 26–34)
MCHC RBC AUTO-ENTMCNC: 31.4 G/DL (ref 30–36.5)
MCV RBC AUTO: 73.4 FL (ref 80–99)
MONOCYTES # BLD: 1 K/UL (ref 0–1)
MONOCYTES NFR BLD: 13 % (ref 5–13)
NEUTS SEG # BLD: 5.3 K/UL (ref 1.8–8)
NEUTS SEG NFR BLD: 72 % (ref 32–75)
NRBC # BLD: 0 K/UL (ref 0–0.01)
NRBC BLD-RTO: 0 PER 100 WBC
PLATELET # BLD AUTO: 97 K/UL (ref 150–400)
PMV BLD AUTO: ABNORMAL FL (ref 8.9–12.9)
POTASSIUM SERPL-SCNC: 3.1 MMOL/L (ref 3.5–5.1)
PROCALCITONIN SERPL-MCNC: 12.66 NG/ML
PROT SERPL-MCNC: 7.3 G/DL (ref 6.4–8.2)
RBC # BLD AUTO: 6.02 M/UL (ref 4.1–5.7)
SERVICE CMNT-IMP: NORMAL
SODIUM SERPL-SCNC: 148 MMOL/L (ref 136–145)
WBC # BLD AUTO: 7.4 K/UL (ref 4.1–11.1)

## 2022-09-07 PROCEDURE — 74011000250 HC RX REV CODE- 250: Performed by: INTERNAL MEDICINE

## 2022-09-07 PROCEDURE — 74011000250 HC RX REV CODE- 250: Performed by: STUDENT IN AN ORGANIZED HEALTH CARE EDUCATION/TRAINING PROGRAM

## 2022-09-07 PROCEDURE — 74011000258 HC RX REV CODE- 258: Performed by: STUDENT IN AN ORGANIZED HEALTH CARE EDUCATION/TRAINING PROGRAM

## 2022-09-07 PROCEDURE — 82962 GLUCOSE BLOOD TEST: CPT

## 2022-09-07 PROCEDURE — 74011250637 HC RX REV CODE- 250/637: Performed by: INTERNAL MEDICINE

## 2022-09-07 PROCEDURE — 82140 ASSAY OF AMMONIA: CPT

## 2022-09-07 PROCEDURE — 74011250636 HC RX REV CODE- 250/636: Performed by: INTERNAL MEDICINE

## 2022-09-07 PROCEDURE — 74011250636 HC RX REV CODE- 250/636: Performed by: STUDENT IN AN ORGANIZED HEALTH CARE EDUCATION/TRAINING PROGRAM

## 2022-09-07 PROCEDURE — 36415 COLL VENOUS BLD VENIPUNCTURE: CPT

## 2022-09-07 PROCEDURE — 74011000258 HC RX REV CODE- 258: Performed by: INTERNAL MEDICINE

## 2022-09-07 PROCEDURE — 65270000046 HC RM TELEMETRY

## 2022-09-07 PROCEDURE — 85025 COMPLETE CBC W/AUTO DIFF WBC: CPT

## 2022-09-07 PROCEDURE — 84145 PROCALCITONIN (PCT): CPT

## 2022-09-07 PROCEDURE — 74011250636 HC RX REV CODE- 250/636: Performed by: NURSE PRACTITIONER

## 2022-09-07 PROCEDURE — 80053 COMPREHEN METABOLIC PANEL: CPT

## 2022-09-07 RX ORDER — CARVEDILOL 6.25 MG/1
6.25 TABLET ORAL 2 TIMES DAILY
Status: DISCONTINUED | OUTPATIENT
Start: 2022-09-07 | End: 2022-09-09

## 2022-09-07 RX ORDER — POTASSIUM CHLORIDE 7.45 MG/ML
10 INJECTION INTRAVENOUS
Status: COMPLETED | OUTPATIENT
Start: 2022-09-07 | End: 2022-09-07

## 2022-09-07 RX ORDER — BALSAM PERU/CASTOR OIL
OINTMENT (GRAM) TOPICAL 2 TIMES DAILY
Status: DISCONTINUED | OUTPATIENT
Start: 2022-09-07 | End: 2022-09-09 | Stop reason: HOSPADM

## 2022-09-07 RX ADMIN — SODIUM CHLORIDE, PRESERVATIVE FREE 10 ML: 5 INJECTION INTRAVENOUS at 22:30

## 2022-09-07 RX ADMIN — LACTULOSE 45 ML: 20 SOLUTION ORAL at 09:47

## 2022-09-07 RX ADMIN — POTASSIUM CHLORIDE 10 MEQ: 7.46 INJECTION, SOLUTION INTRAVENOUS at 03:21

## 2022-09-07 RX ADMIN — METRONIDAZOLE 500 MG: 500 INJECTION, SOLUTION INTRAVENOUS at 22:29

## 2022-09-07 RX ADMIN — CARVEDILOL 6.25 MG: 6.25 TABLET, FILM COATED ORAL at 15:43

## 2022-09-07 RX ADMIN — VANCOMYCIN HYDROCHLORIDE 1000 MG: 1 INJECTION, POWDER, LYOPHILIZED, FOR SOLUTION INTRAVENOUS at 09:47

## 2022-09-07 RX ADMIN — LEVETIRACETAM 750 MG: 500 TABLET, FILM COATED ORAL at 22:28

## 2022-09-07 RX ADMIN — POTASSIUM CHLORIDE 10 MEQ: 7.46 INJECTION, SOLUTION INTRAVENOUS at 04:20

## 2022-09-07 RX ADMIN — THIAMINE HYDROCHLORIDE 200 MG: 100 INJECTION, SOLUTION INTRAMUSCULAR; INTRAVENOUS at 13:02

## 2022-09-07 RX ADMIN — DEXTROSE AND SODIUM CHLORIDE 50 ML/HR: 5; 450 INJECTION, SOLUTION INTRAVENOUS at 17:42

## 2022-09-07 RX ADMIN — SODIUM CHLORIDE 1 G: 900 INJECTION INTRAVENOUS at 05:08

## 2022-09-07 RX ADMIN — LEVETIRACETAM 1000 MG: 100 INJECTION INTRAVENOUS at 09:38

## 2022-09-07 RX ADMIN — POTASSIUM CHLORIDE 10 MEQ: 7.46 INJECTION, SOLUTION INTRAVENOUS at 05:09

## 2022-09-07 RX ADMIN — SODIUM CHLORIDE, PRESERVATIVE FREE 10 ML: 5 INJECTION INTRAVENOUS at 15:43

## 2022-09-07 RX ADMIN — METRONIDAZOLE 500 MG: 500 INJECTION, SOLUTION INTRAVENOUS at 12:55

## 2022-09-07 RX ADMIN — SODIUM CHLORIDE, PRESERVATIVE FREE 10 ML: 5 INJECTION INTRAVENOUS at 05:12

## 2022-09-07 RX ADMIN — POTASSIUM CHLORIDE 10 MEQ: 7.46 INJECTION, SOLUTION INTRAVENOUS at 07:01

## 2022-09-07 RX ADMIN — CASTOR OIL AND BALSAM, PERU: 788; 87 OINTMENT TOPICAL at 22:28

## 2022-09-07 NOTE — PROGRESS NOTES
Hospitalist Progress Note    NAME: Bouchra Kay   :  1965   MRN:  [id number]     Admit date: 2022    Today's date: 22    PCP: Ebony Corona MD    Anticipated discharge date: 2022    Barriers:  not medically stable    Assessment / Plan:    Tonic-clonic seizure POA no recurrence  Acute metabolic encephalopathy POA ? Post-ictal, ? Hepatic encephalopathy, ? Sepsis, hypoglycemia  Hypoglycemia glucose 23 on 9/3/2022 POA resolved  Baseline aphasia and right hemiparesis secondary to old CVA POA  Leukocytosis POA elevated procalcitonin  18.95, unclear source  Lactic acidosis lactate POA 7.69 --> 1.7   Abnormal LFTs - trending down  Lactic acidosis may relate to seizure and poor hepatic clearance  Prior on seizure meds, not at admit  Now with recurrent seizure. Lethargic after admit, slow to wake up  Definitely improved from admit  Known cirrhosis, ammonia elevated, ? Clinical significance   Improved with rectal lactulose, change to PO  Continue marinanicholas [facility] [place] [facility] followed  EEG abnormal.   Mild to moderate generalized slowing as seen in encephalopathies. There is no focal asymmetry, seizures or epileptiform discharges seen. Elevated procalcitonin 18.95 suggests infection  CXR with no ASD  CT abdomen/pelvis IMPRESSION  1. Study without oral or IV contrast with resultant diminished assess stability  of bowel, vasculature and solid organs. 2. Moderate cardiomegaly with evidence for right heart failure and superior vena  cava passive venous congestion. 3. Cirrhosis. Perihepatic ascites. 4. Loop of left mid abdominal small bowel with equivocal mural thickening. 5. Equivocal pneumatosis in cecum. 6. Air-fluid levels within colon demonstrated distally  7.  ASD at [facility] negative  UA 10-20 WBC, 10-20 RBC, 1+ bacteria   Urine culture negative  Empiric vanco, cefepime, flagyl  BS dropped to 23 on 9/3/2022, ? stress and poor hepatic function   Resolved on D5W  IVF with dextrose - taper to off in next 24-48 hrs  as pt eats more  Appreciate Gi input- signed off, OP followup with Hepatology Dr Mariajose Lay  Acute hepatitis panel negative    Cirrhosis POA  Hyperammonemia NH4 113 --> 14 now  Hyperbilirubinemia POA  Coagulopathy INR 1.7 POA  Alcohol abuse POA  Trace perihepatic ascites on CT scan  Serial levels, lactulose enemas, NH improved  Ammonia improved on lactulose enemas   Changed to PO lactulose     Acute kidney injury POA creatinine 2.26- Cr improved to 1.8 today  Hypokalemia- K+ 3.1  Last baseline 1 year ago was 1.62  IVF  Serial labs  Check urine NA and creat  No hydronephrosis on the CT scan  Replenish K+ IV x 1 today    Elevated troponin 299 in setting of sepsis, seizure  Atrial fibrillation  Essential hypertension  Idiopathic hypertrophic subaortic stenosis  Status post PPM-unclear indication (tachybradycardia syndrome?)  Echo LVEF 15-20%  Troponin trending down     Code Status: Full     DVT Prophylaxis: Lovenox  GI Prophylaxis: not indicated  Diet: Dysphagia diet now    Body mass index is 26.21 kg/m². Subjective:     Chief Complaint / Reason for Physician Visit   Discussed with RN events overnight. Baseline non verbal, opening eyes to voice, much more responsive  Non sustained VT overnight  LFTs continue to worsen  Not able to provide any history    Review of Systems:  Symptom Y/N Comments  Symptom Y/N Comments   Fever/Chills n   Chest Pain     Poor Appetite    Edema     Cough    Abdominal Pain     Sputum    Joint Pain     SOB/CARDOZA    Headache     Nausea/vomit    Tolerating PT/OT     Diarrhea    Tolerating Diet     Constipation    Other       Could NOT obtain due to: Non verbal     Objective:     VITALS:   Last 24hrs VS reviewed since prior progress note.  Most recent are:  Patient Vitals for the past 24 hrs:   Temp Pulse Resp BP SpO2   09/07/22 1055 97.8 °F (36.6 °C) 71 20 (!) 171/85 97 %   09/07/22 0755 98.2 °F (36.8 °C) 71 -- (!) 152/83 100 %   09/07/22 0300 97.7 °F (36.5 °C) 76 20 (!) 157/104 99 %   09/06/22 2300 97.9 °F (36.6 °C) 74 20 (!) 171/96 100 %   09/06/22 1913 97.6 °F (36.4 °C) 74 20 115/62 99 %         Intake/Output Summary (Last 24 hours) at 9/7/2022 1420  Last data filed at 9/7/2022 0420  Gross per 24 hour   Intake 842.92 ml   Output 450 ml   Net 392.92 ml          Wt Readings from Last 12 Encounters:   09/07/22 75.9 kg (167 lb 5.3 oz)   09/21/21 77.5 kg (170 lb 12.8 oz)   08/13/21 76.7 kg (169 lb)   05/01/17 81.6 kg (180 lb)   04/20/17 81.6 kg (180 lb)   04/14/17 81.6 kg (180 lb)   04/10/17 82.6 kg (182 lb)   06/26/16 89 kg (196 lb 3.4 oz)   10/12/15 86.2 kg (190 lb)   08/15/15 93.8 kg (206 lb 12.8 oz)   06/30/15 90.7 kg (200 lb)   05/15/15 91.6 kg (201 lb 14.4 oz)       PHYSICAL EXAM:    I had a face to face encounter and independently examined this patient on 09/07/22 as outlined below:    General: WD, WN. Lethargic, no seizure activity,, no acute distress    EENT:  PERRL. Anicteric sclerae. MMM  Resp:  CTA bilaterally, no wheezing or rales. No accessory muscle use  CV:  Regular  rhythm,  No edema  GI:  Soft, Non distended, Non tender. +hypoactive Bowel sounds, no rebound  Neurologic:  Lethargic, opens eyes when stimulated, not following commands  Psych:   Not anxious nor agitated  Skin:  No rashes. No jaundice    Reviewed most current lab test results and cultures  YES  Reviewed most current radiology test results   YES  Review and summation of old records today    NO  Reviewed patient's current orders and MAR    YES  PMH/SH reviewed - no change compared to H&P  ________________________________________________________________________  Care Plan discussed with:    Comments   Patient x    Family      RN x    Care Manager x    Consultant                       x Multidiciplinary team rounds were held today with , nursing, pharmacist and clinical coordinator. Patient's plan of care was discussed; medications were reviewed and discharge planning was addressed. ________________________________________________________________________  Total Time: 36 mins    Comments   >50% of visit spent in counseling and coordination of care x    ________________________________________________________________________  Renita Pino MD     Procedures: see electronic medical records for all procedures/Xrays and details which were not copied into this note but were reviewed prior to creation of Plan. LABS:  I reviewed today's most current labs and imaging studies.   Pertinent labs include:  Recent Labs     09/07/22  0021 09/06/22  0242 09/05/22  0010   WBC 7.4 10.0 12.9*   HGB 13.9 13.4 15.1   HCT 44.2 42.2 48.2   PLT 97* 93* 109*       Recent Labs     09/07/22  0021 09/06/22  0242 09/05/22  0414 09/05/22  0010   * 147*  --  143   K 3.1* 3.5  --  3.7   * 119*  --  113*   CO2 26 25  --  26   * 123*  --  92   BUN 25* 28*  --  31*   CREA 1.85* 2.16*  --  2.45*   CA 8.7 8.6  --  9.2   MG  --  1.7  --   --    ALB 3.0* 3.0*  --  3.5   TBILI 5.1* 5.0*  --  4.3*   ALT 1,456* 1,977*  --  1,115*   INR  --   --  2.1* 2.3*

## 2022-09-07 NOTE — PROGRESS NOTES
End of Shift Note    Bedside shift change report given to [place] (oncoming nurse) by Valente Vallejo RN (offgoing nurse). Report included the following information SBAR, Kardex, Intake/Output, MAR, and Quality Measures    Shift worked:  Day     Shift summary and any significant changes:     Lactulose caused significant diarrhea. Night dose held. Ammonia levels WDL     Concerns for physician to address:  HTN     Zone phone for oncoming shift:          Activity:  Activity Level: Bed Rest  Number times ambulated in hallways past shift: 0  Number of times OOB to chair past shift: 0    Cardiac:   Cardiac Monitoring: Yes      Cardiac Rhythm: Ventricular Paced    Access:  Current line(s): PIV     Genitourinary:   Urinary status: voiding and external catheter    Respiratory:   O2 Device: None (Room air)  Chronic home O2 use?: NO  Incentive spirometer at bedside: NO       GI:  Last Bowel Movement Date: 09/07/22  Current diet:  ADULT DIET Dysphagia - Pureed  Passing flatus: YES  Tolerating current diet: YES       Pain Management:   Patient states pain is manageable on current regimen: N/A    Skin:  Ralph Score: 13  Interventions: float heels and limit briefs    Patient Safety:  Fall Score:  Total Score: 4  Interventions: bed/chair alarm  High Fall Risk: Yes    Length of Stay:  Expected LOS: 4d 19h  Actual LOS: [address], RN

## 2022-09-07 NOTE — CONSULTS
EP/ ARRHYTHMIA/ CARDIOLOGY CONSULT    Patient ID:  Patient: Raquel Davis  MRN: [id number]  Age: 60 y.o.  : 1965    Date of  Admission: 2022  9:30 PM   PCP:  Wilmer Aguirre MD    Assessment:   Nonsustained VT reported on tele, no ICD therapy. RV apical pacing noted. Chronic nonischemic cardiomyopathy LVEF 15%. Chronic biventricular failure based on echo here. Chronic atrial flutter with prior surgical MAZE in . Remote mitral valve repair per notes. Dual chamber ICD upgrade to prior dual chamber pacemaker. There are atrial and ventricular pacemaker leads that appear abandoned on CXR and atrial and ventricular leads connected to the [facility] ICD system at the left chest.  Stroke with residual R-sided weakness and aphasia. Seizure disorder. Chronic anticoagulation. EtOH cirrhosis. Thrombocytopenia. Hypernatremia, mild. Elevated procalcitonin, lactic acidosis, on antibiotics. Full code. Plan: Will interrogate the ICD tomorrow to get more data on the atrial flutter, V pacing, and see if the seizure was cardiogenic (ventricular arrhythmia). .  Regarding the dual chamber ICD, if he is chronically RV apical pacing, he may benefit from BIV-ICD upgrade when the time Is right. The RV apical pacing may be deleterious and cause further CHF. Changed his infusion to D51/2NS, see what electrolytes are like tomorrow. Blood cultures negative to date. Elevated procalcitonin, lactic acidosis, suggestive of bacterial sepsis, but to be worked out. Not a candidate for beta-blocker or ARB/ACEI addition at this time due to lower BP. If BP allows, would use propranolol as a beta-blocker. Will check back tomorrow. With more data, will be better able to make recommendations. [x]       High complexity decision making was performed in this patient at high risk for decompensation. Raquel Davis is a 60 y.o. male with a history of the above.   Seizure [facility] at the SNF.    Per the hospitalist H&P:  Ivanna Garcia is a 60 y.o.  male with pertinent medical history as listed below who presents from convalescent home in [place] after witnessed seizure by EMS who provided 4 mg IV Versed with resolution of seizure activity. Blood sugar at that time was around 50 and corrected. EMS states that facility had very little information for them about the patient and patient arrived without any paperwork. Patient reportedly aphasic at baseline and unable to provide any history. History collected exclusively from chart review (limited recent records) and report. In the ED, patient hemodynamically stable (hypertensive 190s/90s improved to 140s/80s). Saturating well on room air. CXR demonstrated enlarged cardiac silhouette, minimal atelectasis in the left base. CT head demonstrated extensive encephalomalacia not significant change. No acute abnormality. Rapid COVID-negative. Labs demonstrate: Lactic 7.05 on presentation and improved to 4.7, WBC 14.4, hemoglobin 14.7, platelets 886, ammonia 113, ethyl alcohol undetectable, salicylates undetectable, Tylenol undetectable, proBNP 2709 (prior 22,000), high-sensitivity troponin 176 increased to 238, CK2 162. Sodium 134, potassium 5.4, glucose 111, creatinine 2.29 (1.6 to 1-year prior), AST 36 5, ALT 36, alk phos 262. We were asked to admit for work up and evaluation of the above problems. \"       Past Medical History:   Diagnosis Date    Acid reflux     Alcohol abuse     Arthropathy, unspecified, site unspecified     Atrial fibrillation ([facility] [address]     Calcaneus fracture     Condyloma     Depression     Essential hypertension     H/O mitral valve repair 2006    Along with MAZE procedure    High cholesterol     History of angina     Hypertension     IHSS (idiopathic hypertrophic subaortic stenosis) ([facility])     Ill-defined condition     pacemaker    Impotence of organic origin     LA thrombus 05/2015    LA and ARNULFO thrombus Pacemaker     Thyroid disease     Trichilemmal cyst 4/10/2017    Unspecified cerebral artery occlusion with cerebral infarction         Past Surgical History:   Procedure Laterality Date    HX APPENDECTOMY      HX CYST REMOVAL      HX HEART CATHETERIZATION      HX OTHER SURGICAL  2005    HX OTHER SURGICAL  2017    excision of scalp mass    HX PACEMAKER  2006    [place] CARDIAC SURG PROCEDURE UNLIST  2006    Mitral Valve Replacement       Social History     Tobacco Use    Smoking status: Former     Types: Cigarettes     Quit date: 2020     Years since quittin.0    Smokeless tobacco: Never   Substance Use Topics    Alcohol use: Yes     Alcohol/week: 0.0 standard drinks     Types: 1 - 2 Cans of beer per week     Comment: occ        No family history on file.      Allergies   Allergen Reactions    Bactrim [Sulfamethoxazole-Trimethoprim] Other (comments)     GI Distress          Current Facility-Administered Medications   Medication Dose Route Frequency    lactulose (CHRONULAC) 10 gram/15 mL solution 45 mL  30 g Oral BID    thiamine (B-1) 200 mg in 0.9% sodium chloride 50 mL IVPB  200 mg IntraVENous DAILY    metroNIDAZOLE (FLAGYL) IVPB premix 500 mg  500 mg IntraVENous Q12H    levETIRAcetam (KEPPRA) injection 1,000 mg  1,000 mg IntraVENous Q12H    sodium chloride (NS) flush 5-40 mL  5-40 mL IntraVENous Q8H    sodium chloride (NS) flush 5-40 mL  5-40 mL IntraVENous PRN    acetaminophen (TYLENOL) tablet 650 mg  650 mg Oral Q6H PRN    Or    acetaminophen (TYLENOL) suppository 650 mg  650 mg Rectal Q6H PRN    polyethylene glycol (MIRALAX) packet 17 g  17 g Oral DAILY PRN    ondansetron (ZOFRAN ODT) tablet 4 mg  4 mg Oral Q8H PRN    Or    ondansetron (ZOFRAN) injection 4 mg  4 mg IntraVENous Q6H PRN    cefTRIAXone (ROCEPHIN) 1 g in 0.9% sodium chloride (MBP/ADV) 50 mL MBP  1 g IntraVENous Q24H    dextrose 5% and 0.9% NaCl infusion  75 mL/hr IntraVENous CONTINUOUS    dextrose 10% infusion 0-250 mL 0-250 mL IntraVENous PRN    vancomycin (VANCOCIN) 1,000 mg in 0.9% sodium chloride 250 mL ([id number])  1,000 mg IntraVENous Q24H       Review of Symptoms:  He cannot convey a ROS at this time. General: negative for fever, chills, sweats, weakness, weight loss   Eyes: negative for blurred vision, eye pain, loss of vision, diplopia   Ear Nose and Throat: negative for rhinorrhea, pharyngitis, otalgia, tinnitus, speech or swallowing difficulties   Respiratory: negative for SOB, cough, sputum production, wheezing, CARDOZA, pleuritic pain   Cardiology: negative for chest pain, palpitations, orthopnea, PND, edema, syncope   Gastrointestinal: negative for abdominal pain, N/V, dysphagia, change in bowel habits, bleeding   Genitourinary: negative for frequency, urgency, dysuria, hematuria, incontinence   Muskuloskeletal : negative for arthralgia, myalgia   Hematology: negative for easy bruising, bleeding, lymphadenopathy   Dermatological: negative for rash, ulceration, mole change, new lesion   Endocrine: negative for hot flashes or polydipsia   Neurological: negative for headache, dizziness, confusion, focal weakness, paresthesia, memory loss, gait disturbance   Psychological: negative for anxiety, depression, agitation       Objective:      Physical Exam:  Temp (24hrs), Av.4 °F (36.3 °C), Min:97.2 °F (36.2 °C), Max:97.8 °F (36.6 °C)    Patient Vitals for the past 8 hrs:   Pulse   22 1913 74    No data found. Patient Vitals for the past 8 hrs:   BP   22 1913 115/62        Intake/Output Summary (Last 24 hours) at 20227  Last data filed at 2022 0425  Gross per 24 hour   Intake 673.75 ml   Output --   Net 673.75 ml       Nondiaphoretic, not in acute distress. Supple, no palpable thyromegaly. No scleral icterus, mucous membranes moist, conjuctivae pink, no xanthelasma. Unlabored, clear to auscultation bilaterally anteriorly, symmetric air movement.   Regular rate and rhythm, no murmur, pericardial rub, knock, or gallop. No peripheral edema. Palpable radial pulses bilaterally. Abdomen, soft, nontender, nondistended. No abdominal bruit or pulstatile masses. Extremities without cyanosis or clubbing. Skin warm and dry. No rashes or ulcers. Awake, hard to tell if appropriate given the aphasia. CARDIOGRAPHICS and STUDIES, I reviewed:    Telemetry:  Currently ventricular pacing. ECG:  Could not open the ECG in the viewer. Labs:  Recent Labs     09/05/22  0010   *     No results found for: CHOL, CHOLX, CHLST, CHOLV, HDL, HDLP, LDL, LDLC, DLDLP, Anahy Reeks, CHHD, CHHDX  Recent Labs     09/05/22  0414 09/05/22  0010   INR 2.1* 2.3*   PTP 21.3* 23.1*   APTT 40.9* >130.0*      Recent Labs     09/06/22  0242 09/05/22  0010 09/04/22  0930   * 143 143   K 3.5 3.7 3.9   * 113* 112*   CO2 25 26 25   BUN 28* 31* 31*   CREA 2.16* 2.45* 2.49*   * 92 112*   CA 8.6 9.2 8.6   ALB 3.0* 3.5 3.0*   WBC 10.0 12.9* 14.9*   HGB 13.4 15.1 13.3   HCT 42.2 48.2 42.6   PLT 93* 109* 96*     Recent Labs     09/06/22  0242 09/05/22  0010 09/04/22  0930   * 225* 183*   TP 7.0 7.8 7.1   ALB 3.0* 3.5 3.0*   GLOB 4.0 4.3* 4.1*     No components found for: GLPOC  No results for input(s): PH, PCO2, PO2 in the last 72 hours.         Arabella Draper MD  9/6/2022

## 2022-09-07 NOTE — PROGRESS NOTES
EP/ ARRHYTHMIA Progress Note    Patient ID:  Patient: Noble Kamara  MRN: [id number]  Age: 60 y.o.  : 1965    Date of  Admission: 2022  9:30 PM   PCP:  Osito Molina MD    Assessment:   Nonsustained VT reported on tele, no ICD therapy. Typical to have some events like this given the state of his heart. RV apical pacing noted with iatrogenic LBBB. His natural HR is in the 40-50's with narrow QRS, but this may compromise his cardiac output. Chronic nonischemic cardiomyopathy LVEF 15%. Chronic biventricular failure based on echo here. Chronic atrial flutter with prior surgical MAZE in . He had recurrence of atrial flutter in 2022 which has sustained. Remote mitral valve repair per notes. Dual chamber ICD upgrade to prior dual chamber pacemaker. There are atrial and ventricular pacemaker leads that appear abandoned on CXR and atrial and ventricular leads connected to the [facility] ICD system at the left chest.  Stroke with residual R-sided weakness and aphasia. Seizure disorder. Chronic anticoagulation. EtOH cirrhosis. Thrombocytopenia. Hypernatremia, mild. Elevated procalcitonin, lactic acidosis, on antibiotics. Full code. Plan:     I interrogated the dual chamber ICD to get more data on the atrial flutter, V pacing, and see if the seizure was cardiogenic (ventricular arrhythmia). The seizure was NOT cardiogenic, no arrhythmias to explain this as recorded by the device. Regarding the dual chamber ICD, if he is chronically RV apical pacing, he may benefit from BIV-ICD upgrade when the time Is right. The RV apical pacing may be deleterious and cause further CHF. Changed his infusion to D51/2NS yesterday, so far, so good. Blood cultures negative to date. Elevated procalcitonin, lactic acidosis, suggestive of bacterial sepsis but no source identified. Not a candidate for beta-blocker or ARB/ACEI addition at this time due to lower BP.   If BP allows, would use propranolol as a beta-blocker. From a cardiac standpoint, he may benefit from CARDIOVERSION, and maybe YULIANA-cardioversion if there is any question about his compliance with anticoagulation. I am suspicious about this  The hope would be that he has less deleterious V pacing in sinus, and this will be clarified. I'll make him NPO after MN, see if a YULIANA-cardioversion can be done tomorrow. [x]       High complexity decision making was performed in this patient at high risk for decompensation. Haylee Golden is a 60 y.o. male with a history of the above. Seizure witnessed at the SNF. Per the hospitalist H&P:  Alvarez Gautam is a 60 y.o.  male with pertinent medical history as listed below who presents from convalescent home in [place] after witnessed seizure by EMS who provided 4 mg IV Versed with resolution of seizure activity. Blood sugar at that time was around 50 and corrected. EMS states that facility had very little information for them about the patient and patient arrived without any paperwork. Patient reportedly aphasic at baseline and unable to provide any history. History collected exclusively from chart review (limited recent records) and report. In the ED, patient hemodynamically stable (hypertensive 190s/90s improved to 140s/80s). Saturating well on room air. CXR demonstrated enlarged cardiac silhouette, minimal atelectasis in the left base. CT head demonstrated extensive encephalomalacia not significant change. No acute abnormality. Rapid COVID-negative. Labs demonstrate: Lactic 7.05 on presentation and improved to 4.7, WBC 14.4, hemoglobin 14.7, platelets 393, ammonia 113, ethyl alcohol undetectable, salicylates undetectable, Tylenol undetectable, proBNP 2709 (prior 22,000), high-sensitivity troponin 176 increased to 238, CK2 162. Sodium 134, potassium 5.4, glucose 111, creatinine 2.29 (1.6 to 1-year prior), AST 36 5, ALT 36, alk phos 262.      We were asked to admit for work up and evaluation of the above problems. \"       Allergies   Allergen Reactions    Bactrim [Sulfamethoxazole-Trimethoprim] Other (comments)     GI Distress          Current Facility-Administered Medications   Medication Dose Route Frequency    [START ON 9/8/2022] Vancomycin level 14:00 9/8 AND 9:00 9/9   Other DAILY    levETIRAcetam (KEPPRA) tablet 750 mg  750 mg Oral Q12H    balsam peru-castor oiL (VENELEX) ointment   Topical BID    carvediloL (COREG) tablet 6.25 mg  6.25 mg Oral BID    lactulose (CHRONULAC) 10 gram/15 mL solution 45 mL  30 g Oral BID    dextrose 5 % - 0.45% NaCl infusion  50 mL/hr IntraVENous CONTINUOUS    thiamine (B-1) 200 mg in 0.9% sodium chloride 50 mL IVPB  200 mg IntraVENous DAILY    metroNIDAZOLE (FLAGYL) IVPB premix 500 mg  500 mg IntraVENous Q12H    sodium chloride (NS) flush 5-40 mL  5-40 mL IntraVENous Q8H    sodium chloride (NS) flush 5-40 mL  5-40 mL IntraVENous PRN    [Held by provider] acetaminophen (TYLENOL) tablet 650 mg  650 mg Oral Q6H PRN    Or    [Held by provider] acetaminophen (TYLENOL) suppository 650 mg  650 mg Rectal Q6H PRN    polyethylene glycol (MIRALAX) packet 17 g  17 g Oral DAILY PRN    ondansetron (ZOFRAN ODT) tablet 4 mg  4 mg Oral Q8H PRN    Or    ondansetron (ZOFRAN) injection 4 mg  4 mg IntraVENous Q6H PRN    cefTRIAXone (ROCEPHIN) 1 g in 0.9% sodium chloride (MBP/ADV) 50 mL MBP  1 g IntraVENous Q24H    dextrose 10% infusion 0-250 mL  0-250 mL IntraVENous PRN    vancomycin (VANCOCIN) 1,000 mg in 0.9% sodium chloride 250 mL ([id number])  1,000 mg IntraVENous Q24H       Review of Symptoms:  He cannot convey a ROS at this time.   General: negative for fever, chills, sweats, weakness, weight loss   Eyes: negative for blurred vision, eye pain, loss of vision, diplopia   Ear Nose and Throat: negative for rhinorrhea, pharyngitis, otalgia, tinnitus, speech or swallowing difficulties   Respiratory: negative for SOB, cough, sputum production, wheezing, CARDOZA, pleuritic pain   Cardiology: negative for chest pain, palpitations, orthopnea, PND, edema, syncope   Gastrointestinal: negative for abdominal pain, N/V, dysphagia, change in bowel habits, bleeding   Genitourinary: negative for frequency, urgency, dysuria, hematuria, incontinence   Muskuloskeletal : negative for arthralgia, myalgia   Hematology: negative for easy bruising, bleeding, lymphadenopathy   Dermatological: negative for rash, ulceration, mole change, new lesion   Endocrine: negative for hot flashes or polydipsia   Neurological: negative for headache, dizziness, confusion, focal weakness, paresthesia, memory loss, gait disturbance   Psychological: negative for anxiety, depression, agitation       Objective:      Physical Exam:  Temp (24hrs), Av.9 °F (36.6 °C), Min:97.6 °F (36.4 °C), Max:98.2 °F (36.8 °C)    Patient Vitals for the past 8 hrs:   Pulse   22 1430 72   22 1400 73   22 1300 72   22 1055 71      Patient Vitals for the past 8 hrs:   Resp   22 1430 20   22 1055 20      Patient Vitals for the past 8 hrs:   BP   22 1430 (!) 167/91   22 1400 (!) 157/81   22 1300 (!) 176/98   22 1055 (!) 171/85          Intake/Output Summary (Last 24 hours) at 2022 1733  Last data filed at 2022 0420  Gross per 24 hour   Intake 842.92 ml   Output 450 ml   Net 392.92 ml         Nondiaphoretic, not in acute distress. No scleral icterus, mucous membranes moist, conjuctivae pink, no xanthelasma. Unlabored, clear to auscultation bilaterally anteriorly, symmetric air movement. Regular rate and rhythm, no murmur, pericardial rub, knock, or gallop. No peripheral edema. Palpable radial pulses bilaterally. Abdomen, soft, nontender, nondistended. Extremities without cyanosis or clubbing. Skin warm and dry. No rashes or ulcers. Awake, hard to tell if appropriate given the aphasia.     CARDIOGRAPHICS and STUDIES, I reviewed:    Telemetry:  Currently ventricular pacing. ECG:  Could not open the ECG in the viewer. Labs:  Recent Labs     09/05/22  0010   *       No results found for: CHOL, CHOLX, CHLST, CHOLV, HDL, HDLP, LDL, LDLC, DLDLP, Yip Rear, CHHD, CHHDX  Recent Labs     09/05/22  0414 09/05/22  0010   INR 2.1* 2.3*   PTP 21.3* 23.1*   APTT 40.9* >130.0*        Recent Labs     09/07/22  0021 09/06/22  0242 09/05/22  0010   * 147* 143   K 3.1* 3.5 3.7   * 119* 113*   CO2 26 25 26   BUN 25* 28* 31*   CREA 1.85* 2.16* 2.45*   * 123* 92   CA 8.7 8.6 9.2   ALB 3.0* 3.0* 3.5   WBC 7.4 10.0 12.9*   HGB 13.9 13.4 15.1   HCT 44.2 42.2 48.2   PLT 97* 93* 109*       Recent Labs     09/07/22  0021 09/06/22  0242 09/05/22  0010   * 189* 225*   TP 7.3 7.0 7.8   ALB 3.0* 3.0* 3.5   GLOB 4.3* 4.0 4.3*       No components found for: GLPOC  No results for input(s): PH, PCO2, PO2 in the last 72 hours.         Eryn Gibson MD  9/7/2022

## 2022-09-07 NOTE — PROGRESS NOTES
Received notification from bedside RN about patient with regards to: K+ 3.1  VS: /96, HR 74, RR 20, O2 sat 100% on RA    Intervention given: Kcl 10 meq IV x 4 doses, BMP and Magnesium for tomorrow AM ordered

## 2022-09-07 NOTE — PROGRESS NOTES
End of Shift Note    Bedside shift change report given to Елена Wyatt (oncoming nurse) by Rafael Jimenez RN (offgoing nurse). Report included the following information SBAR, Kardex, MAR, and Recent Results    Shift worked:  [id number]     Shift summary and any significant changes:     K 3.1, receiving potassium       Concerns for physician to address:       Zone phone for oncoming shift:          Activity:  Activity Level: Bed Rest  Number times ambulated in hallways past shift: 0  Number of times OOB to chair past shift: 0    Cardiac:   Cardiac Monitoring: Yes      Cardiac Rhythm: Atrial Fib, Ventricular Paced    Access:  Current line(s): PIV     Genitourinary:   Urinary status: voiding, incontinent, and external catheter    Respiratory:   O2 Device: None (Room air)  Chronic home O2 use?: NO  Incentive spirometer at bedside: NO       GI:  Last Bowel Movement Date: 09/06/22  Current diet:  ADULT DIET Dysphagia - Pureed  Passing flatus: YES  Tolerating current diet: YES       Pain Management:   Patient states pain is manageable on current regimen: YES    Skin:  Ralph Score: 13  Interventions: PT/OT consult and internal/external urinary devices    Patient Safety:  Fall Score:  Total Score: 4  Interventions: bed/chair alarm and gripper socks  High Fall Risk: Yes    Length of Stay:  Expected LOS: - - -  Actual LOS: 4      Rafael Jimenez RN

## 2022-09-07 NOTE — PROGRESS NOTES
Hospitalist Progress Note    NAME: Jonna Mcmanus   :  1965   MRN:  [id number]     Admit date: 2022    Today's date: 22    PCP: Marianela Johnson MD    Anticipated discharge date: 2022    Barriers:  not medically stable    Assessment / Plan:    Tonic-clonic seizure POA no recurrence  Acute metabolic encephalopathy POA ? Post-ictal, ? Hepatic encephalopathy, ? Sepsis, hypoglycemia  Hypoglycemia glucose 23 on 9/3/2022 POA resolved  Baseline aphasia and right hemiparesis secondary to old CVA POA  Leukocytosis POA elevated procalcitonin  18.95, unclear source  Lactic acidosis lactate 7.69 --> POA   Abnormal LFTs increasing AST 1752, ALT 1977, alk phos 189  Lactic acidosis may relate to seizure and poor hepatic clearance  Prior on seizure meds, not at admit  Now with recurrent seizure. Lethargic after admit, slow to wake up  Definitely improved from admit  Known cirrhosis, ammonia elevated, ? Clinical significance   Improved with rectal lactulose, change to PO  Continue keppra  Dr Henry Johnson followed  EEG abnormal.   Mild to moderate generalized slowing as seen in encephalopathies. There is no focal asymmetry, seizures or epileptiform discharges seen. Elevated procalcitonin 18.95 suggests infection  CXR with no ASD  CT abdomen/pelvis IMPRESSION  1. Study without oral or IV contrast with resultant diminished assess stability  of bowel, vasculature and solid organs. 2. Moderate cardiomegaly with evidence for right heart failure and superior vena  cava passive venous congestion. 3. Cirrhosis. Perihepatic ascites. 4. Loop of left mid abdominal small bowel with equivocal mural thickening. 5. Equivocal pneumatosis in cecum. 6. Air-fluid levels within colon demonstrated distally  7.  [facility] at [facility] negative  UA 10-20 WBC, 10-20 RBC, 1+ bacteria   Urine culture negative  Empiric vanco, cefepime, flagyl  BS dropped to 23 on 9/3/2022, ? stress and poor hepatic function   Resolved on D5W  IVF  Appreciate Gi input  Acute hepatitis panel negative    Cirrhosis POA  Hyperammonemia NH4 113 --> 28 POA  Hyperbilirubinemia POA  Coagulopathy INR 1.7 POA  Alcohol abuse POA  Trace perihepatic ascites on CT scan  Serial levels, lactulose enemas, NH improved  Ammonia improved on lactulose enemas   Change to PO lactulose     Acute kidney injury POA creatinine 2.26  Last baseline 1 year ago was 1.62  IVF  Serial labs  Check urine NA and creat  No hydronephrosis on the CT scan    Elevated troponin 299 in setting of sepsis, seizure  Atrial fibrillation  Essential hypertension  Idiopathic hypertrophic subaortic stenosis  Status post PPM-unclear indication (tachybradycardia syndrome?)  Echo LVEF 15-20%  Troponin trending down     Code Status: Full     DVT Prophylaxis: Lovenox  GI Prophylaxis: not indicated  Diet: N.p.o.    Body mass index is 26 kg/m². Subjective:     Chief Complaint / Reason for Physician Visit   Discussed with RN events overnight. Baseline non verbal, opening eyes to voice, much more responsive  Non sustained VT overnight  LFTs continue to worsen  Not able to provide any history    Review of Systems:  Symptom Y/N Comments  Symptom Y/N Comments   Fever/Chills    Chest Pain     Poor Appetite    Edema     Cough    Abdominal Pain     Sputum    Joint Pain     SOB/CARDOZA    Headache     Nausea/vomit    Tolerating PT/OT     Diarrhea    Tolerating Diet     Constipation    Other       Could NOT obtain due to: Non verbal     Objective:     VITALS:   Last 24hrs VS reviewed since prior progress note.  Most recent are:  Patient Vitals for the past 24 hrs:   Temp Pulse Resp BP SpO2   09/06/22 1913 -- 74 -- 115/62 --   09/06/22 1104 -- 72 21 -- 99 %   09/06/22 1103 -- 77 23 -- 99 %   09/06/22 1102 -- 70 20 -- 99 %   09/06/22 1101 -- 71 23 -- 100 %   09/06/22 1100 -- 75 22 (!) 176/98 99 %   09/06/22 1059 -- 73 18 -- 99 %   09/06/22 1058 97.2 °F (36.2 °C) 71 20 (!) 173/82 99 %   09/06/22 1057 -- 72 21 -- 99 % 09/06/22 1056 -- 72 22 -- 100 %   09/06/22 1055 -- 72 21 -- 99 %   09/06/22 1054 -- 76 22 -- 100 %   09/06/22 1053 -- 70 22 -- 99 %   09/06/22 1052 -- 84 23 -- 98 %   09/06/22 1051 -- 72 23 -- 99 %   09/06/22 1050 -- 73 22 -- 100 %   09/06/22 1049 -- 75 22 -- 99 %   09/06/22 1048 -- 74 22 -- 99 %   09/06/22 1047 -- 74 23 -- 99 %   09/06/22 1046 -- 68 22 -- 99 %   09/06/22 1045 -- 72 22 -- 99 %   09/06/22 1044 -- 72 22 -- 99 %   09/06/22 1043 -- 76 23 -- 99 %   09/06/22 1042 -- 71 23 -- 98 %   09/06/22 1041 -- 72 22 -- 99 %   09/06/22 1040 -- 76 22 -- 99 %   09/06/22 1039 -- 72 23 -- 99 %   09/06/22 1038 -- 74 22 -- 99 %   09/06/22 1037 -- 81 22 -- 99 %   09/06/22 1036 -- 71 22 -- 99 %   09/06/22 1035 -- 70 23 -- 98 %   09/06/22 1034 -- 75 22 -- 99 %   09/06/22 1033 -- 74 24 -- 98 %   09/06/22 1032 -- 75 23 -- 99 %   09/06/22 1031 -- 76 22 -- 99 %   09/06/22 1030 -- 75 23 -- 99 %   09/06/22 1029 -- 75 22 -- 99 %   09/06/22 1028 -- 79 23 -- 98 %   09/06/22 1027 -- 73 22 -- 99 %   09/06/22 1026 -- 75 22 -- 99 %   09/06/22 1025 -- 71 23 -- 99 %   09/06/22 1024 -- 72 23 -- 99 %   09/06/22 1023 -- 74 22 -- 99 %   09/06/22 1022 -- 74 23 -- 100 %   09/06/22 1021 -- 75 23 -- 99 %   09/06/22 1020 -- 76 23 -- 99 %   09/06/22 1019 -- 71 23 -- 99 %   09/06/22 1018 -- 71 23 -- 100 %   09/06/22 1017 -- 79 22 -- 100 %   09/06/22 1016 -- 72 22 -- --   09/06/22 1015 -- 70 22 -- --   09/06/22 1014 -- 71 21 -- 100 %   09/06/22 1013 -- 76 19 -- 99 %   09/06/22 1012 -- 73 24 -- 100 %   09/06/22 1011 -- 74 22 -- 100 %   09/06/22 1010 -- 70 23 -- 100 %   09/06/22 1009 -- 74 23 -- 100 %   09/06/22 1008 -- 71 22 -- 100 %   09/06/22 1007 -- 73 22 -- 100 %   09/06/22 1006 -- 70 23 -- 100 %   09/06/22 1005 -- 76 23 -- 100 %   09/06/22 1004 -- 72 24 -- 100 %   09/06/22 1003 -- 72 23 -- 100 %   09/06/22 1002 -- 72 22 -- 100 %   09/06/22 1001 -- 77 23 -- 100 %   09/06/22 1000 -- 80 22 (!) 179/88 100 %   09/06/22 0959 -- 76 24 -- 100 %   09/06/22 0958 -- 73 24 -- 100 %   09/06/22 0957 -- 70 22 -- 100 %   09/06/22 0956 -- 77 22 -- 100 %   09/06/22 0955 -- 73 22 -- 100 %   09/06/22 0954 -- 77 22 -- 100 %   09/06/22 0953 -- 71 22 -- 100 %   09/06/22 0952 -- 73 22 -- 100 %   09/06/22 0951 -- 70 22 -- 100 %   09/06/22 0950 -- 72 22 -- 100 %   09/06/22 0949 -- 70 23 -- 100 %   09/06/22 0948 -- 76 22 -- 100 %   09/06/22 0947 -- 78 19 -- 100 %   09/06/22 0946 -- 77 20 -- 100 %   09/06/22 0945 -- 74 20 -- 100 %   09/06/22 0944 -- 77 23 -- 100 %   09/06/22 0943 -- 77 22 -- 100 %   09/06/22 0942 -- 73 21 -- 100 %   09/06/22 0941 -- 72 21 -- 100 %   09/06/22 0940 -- 75 18 -- 100 %   09/06/22 0939 -- 75 22 -- 100 %   09/06/22 0938 -- 77 22 -- 100 %   09/06/22 0937 -- 72 20 -- 100 %   09/06/22 0936 -- 74 22 -- 100 %   09/06/22 0935 -- 71 23 -- 99 %   09/06/22 0934 -- 74 24 -- 100 %   09/06/22 0933 -- 73 21 -- 100 %   09/06/22 0932 -- 70 22 -- 100 %   09/06/22 0931 -- 69 21 -- 100 %   09/06/22 0930 -- 77 22 -- 100 %   09/06/22 0929 -- 75 19 -- 100 %   09/06/22 0928 -- 76 16 -- 100 %   09/06/22 0927 -- 76 18 -- 100 %   09/06/22 0926 -- 78 20 -- 100 %   09/06/22 0925 -- 78 15 -- 100 %   09/06/22 0924 -- 70 20 -- 100 %   09/06/22 0923 -- 76 19 -- 100 %   09/06/22 0922 -- 71 22 -- 100 %   09/06/22 0921 -- 72 20 -- 100 %   09/06/22 0920 -- 72 23 -- 100 %   09/06/22 0919 -- 70 22 -- 100 %   09/06/22 0918 -- 73 23 -- 100 %   09/06/22 0917 -- 76 22 -- 100 %   09/06/22 0916 -- 72 22 -- 100 %   09/06/22 0915 -- 72 22 -- 99 %   09/06/22 0914 -- 75 24 -- 100 %   09/06/22 0913 -- 71 24 -- 100 %   09/06/22 0912 -- 77 25 -- 100 %   09/06/22 0911 -- 75 24 -- 100 %   09/06/22 0910 -- 73 22 -- 100 %   09/06/22 0909 -- 75 17 -- 100 %   09/06/22 0908 -- 70 15 -- 100 %   09/06/22 0907 -- 72 22 -- 100 %   09/06/22 0906 -- 70 20 -- 100 %   09/06/22 0905 -- 70 23 -- 100 %   09/06/22 0904 -- 76 21 -- 100 %   09/06/22 0903 -- 79 21 -- 100 %   09/06/22 0902 -- 73 20 -- 100 %   09/06/22 0901 -- 70 21 -- 100 %   09/06/22 0900 -- 73 23 (!) 168/110 100 %   09/06/22 0859 -- 72 22 -- 100 %   09/06/22 0858 -- 73 21 -- 100 %   09/06/22 0857 -- 72 22 -- 99 %   09/06/22 0856 -- 70 23 -- 100 %   09/06/22 0855 -- 73 22 -- 100 %   09/06/22 0854 -- 71 19 -- 100 %   09/06/22 0853 -- 77 24 -- 100 %   09/06/22 0852 -- 72 17 -- 100 %   09/06/22 0851 -- 72 23 -- 99 %   09/06/22 0850 -- 76 23 -- 100 %   09/06/22 0849 -- 72 19 -- 99 %   09/06/22 0848 -- 72 17 -- 99 %   09/06/22 0847 -- 73 23 -- 98 %   09/06/22 0846 -- 73 23 -- 99 %   09/06/22 0845 -- 74 21 -- 100 %   09/06/22 0844 -- 70 21 -- 100 %   09/06/22 0843 -- 76 24 -- 100 %   09/06/22 0842 -- 70 22 -- 100 %   09/06/22 0841 -- 71 18 -- 100 %   09/06/22 0840 -- 71 23 -- 100 %   09/06/22 0839 -- 70 22 -- 100 %   09/06/22 0838 -- 72 21 -- 100 %   09/06/22 0837 -- 73 21 -- 100 %   09/06/22 0836 -- 70 18 -- 100 %   09/06/22 0835 -- 77 20 -- 100 %   09/06/22 0834 -- 70 18 -- 100 %   09/06/22 0833 -- 77 20 -- 99 %   09/06/22 0832 -- 70 20 -- 100 %   09/06/22 0831 -- 72 21 -- 100 %   09/06/22 0830 -- 74 22 -- 100 %   09/06/22 0829 -- 72 18 -- 100 %   09/06/22 0828 -- 76 21 -- 100 %   09/06/22 0827 -- 70 23 -- 100 %   09/06/22 0826 -- 73 20 -- 99 %   09/06/22 0825 -- 69 21 -- 99 %   09/06/22 0824 -- 71 22 -- 99 %   09/06/22 0823 -- 70 22 -- 98 %   09/06/22 0822 -- 72 22 -- 99 %   09/06/22 0821 -- 78 23 -- 99 %   09/06/22 0820 -- 70 19 -- 98 %   09/06/22 0819 -- 72 21 -- 98 %   09/06/22 0818 -- 70 22 -- 99 %   09/06/22 0817 -- 70 23 -- 99 %   09/06/22 0816 -- 73 21 -- 98 %   09/06/22 0815 -- 72 18 -- 98 %   09/06/22 0814 -- 74 23 -- 99 %   09/06/22 0813 -- 71 24 -- 99 %   09/06/22 0812 -- 69 21 -- 99 %   09/06/22 0811 -- 72 21 -- 98 %   09/06/22 0810 -- 70 18 -- 98 %   09/06/22 0809 -- 74 24 -- 99 %   09/06/22 0808 -- 71 19 -- 100 %   09/06/22 0807 -- 70 20 -- 99 %   09/06/22 0806 -- 75 20 -- 100 %   09/06/22 0805 -- 75 24 -- 99 %   09/06/22 0804 -- 75 23 -- 99 %   09/06/22 0803 -- 75 21 -- 99 %   09/06/22 0802 -- 79 24 -- 99 %   09/06/22 0801 -- 71 24 -- 99 %   09/06/22 0800 -- 73 19 (!) 154/84 98 %   09/06/22 0759 -- 76 20 -- 100 %   09/06/22 0758 -- 74 25 -- 100 %   09/06/22 0757 -- 81 21 -- 99 %   09/06/22 0756 -- 72 22 -- 100 %   09/06/22 0755 -- 72 22 -- 99 %   09/06/22 0754 -- 70 21 -- 100 %   09/06/22 0753 -- 72 24 -- 100 %   09/06/22 0752 -- 76 24 -- 99 %   09/06/22 0751 -- 72 23 -- 99 %   09/06/22 0750 -- 75 24 -- 99 %   09/06/22 0749 -- 78 23 -- 99 %   09/06/22 0748 -- 72 22 -- 99 %   09/06/22 0747 -- 73 17 -- 100 %   09/06/22 0746 -- 73 16 -- 100 %   09/06/22 0745 -- 78 14 -- 99 %   09/06/22 0744 -- 71 23 -- 100 %   09/06/22 0743 -- 70 23 -- 100 %   09/06/22 0742 -- 76 21 -- 99 %   09/06/22 0741 -- 70 20 -- 100 %   09/06/22 0740 -- 72 23 -- 100 %   09/06/22 0739 -- 70 25 -- 100 %   09/06/22 0738 -- 71 21 -- 98 %   09/06/22 0737 -- 75 23 -- 99 %   09/06/22 0736 -- 78 23 -- 99 %   09/06/22 0735 -- 72 23 -- 98 %   09/06/22 0734 -- 70 20 -- 100 %   09/06/22 0733 -- 75 22 -- 99 %   09/06/22 0732 -- 69 20 -- 98 %   09/06/22 0731 -- 76 25 -- 99 %   09/06/22 0730 -- 78 19 -- 99 %   09/06/22 0729 -- 73 25 -- 98 %   09/06/22 0728 -- 78 26 -- 98 %   09/06/22 0727 -- 71 25 -- 98 %   09/06/22 0726 -- 78 26 -- 98 %   09/06/22 0725 -- 77 23 -- 98 %   09/06/22 0724 -- 74 23 -- 97 %   09/06/22 0723 -- 78 20 -- 100 %   09/06/22 0722 -- 71 24 -- 98 %   09/06/22 0721 -- 70 25 -- 97 %   09/06/22 0720 -- 74 25 -- 98 %   09/06/22 0719 -- 72 24 -- 100 %   09/06/22 0718 -- 75 23 -- 99 %   09/06/22 0717 -- 79 22 -- 99 %   09/06/22 0716 -- 75 22 -- 98 %   09/06/22 0715 -- 72 21 -- 99 %   09/06/22 0714 -- 75 23 -- 100 %   09/06/22 0713 -- 71 24 -- 100 %   09/06/22 0712 -- 75 23 -- 99 %   09/06/22 0711 -- 75 24 -- 99 %   09/06/22 0710 -- 78 23 -- 99 %   09/06/22 0709 -- 70 23 -- 100 %   09/06/22 0708 -- 73 24 -- 99 %   09/06/22 0707 -- 78 23 -- 100 %   09/06/22 0706 -- 74 24 -- 99 %   09/06/22 0705 -- 76 23 -- 100 %   09/06/22 0704 -- 71 24 -- 98 %   09/06/22 0703 -- 73 24 -- 99 %   09/06/22 0702 -- 72 25 -- 99 %   09/06/22 0701 -- 72 24 -- 99 %   09/06/22 0700 -- 74 24 (!) 147/75 100 %   09/06/22 0659 -- 72 24 -- 100 %   09/06/22 0658 -- 73 24 -- 100 %   09/06/22 0657 -- 72 21 -- 99 %   09/06/22 0656 -- 75 25 -- 98 %   09/06/22 0655 -- 74 25 -- 99 %   09/06/22 0654 -- 72 24 -- 99 %   09/06/22 0653 -- 76 24 -- 99 %   09/06/22 0652 -- 76 23 -- 99 %   09/06/22 0651 -- 78 24 -- 98 %   09/06/22 0650 -- 71 24 -- 99 %   09/06/22 0649 -- 77 25 -- 99 %   09/06/22 0648 -- 74 23 -- 99 %   09/06/22 0647 -- 72 25 -- 99 %   09/06/22 0646 -- 73 24 -- 98 %   09/06/22 0645 -- 71 23 -- 99 %   09/06/22 0644 -- 75 21 -- 99 %   09/06/22 0643 -- 75 22 -- 98 %   09/06/22 0642 -- 76 24 -- 99 %   09/06/22 0641 -- 73 21 -- 100 %   09/06/22 0640 -- 70 23 -- 99 %   09/06/22 0639 -- 77 26 -- 99 %   09/06/22 0638 -- 72 27 -- 99 %   09/06/22 0637 -- 72 25 -- 98 %   09/06/22 0636 -- 72 15 -- 100 %   09/06/22 0635 -- 76 22 -- 100 %   09/06/22 0634 -- 73 22 -- 100 %   09/06/22 0633 -- 72 21 -- 98 %   09/06/22 0632 -- 79 24 -- 99 %   09/06/22 0631 -- 70 22 -- 98 %   09/06/22 0630 -- 73 22 -- 100 %   09/06/22 0629 -- 79 23 -- 100 %   09/06/22 0628 -- 73 23 -- 100 %   09/06/22 0627 -- 71 24 -- 100 %   09/06/22 0626 -- 70 28 -- 99 %   09/06/22 0625 -- 72 21 -- 99 %   09/06/22 0624 -- 70 24 -- 99 %   09/06/22 0623 -- 77 21 -- 100 %   09/06/22 0622 -- 70 23 -- 100 %   09/06/22 0621 -- 71 24 -- 100 %   09/06/22 0620 -- 71 24 -- 100 %   09/06/22 0619 -- 76 24 -- 100 %   09/06/22 0618 -- 70 23 -- 100 %   09/06/22 0617 -- 72 23 -- 100 %   09/06/22 0616 -- 74 23 -- 100 %   09/06/22 0615 -- 70 20 -- 100 %   09/06/22 0614 -- 78 24 -- --   09/06/22 0613 -- 76 22 -- --   09/06/22 0612 -- 72 24 -- --   09/06/22 0611 -- 73 27 -- --   09/06/22 0610 -- 79 25 -- --   09/06/22 0609 -- 72 22 -- --   09/06/22 0608 -- 72 22 -- --   09/06/22 0607 -- 76 25 -- --   09/06/22 7164 -- 71 24 -- --   09/06/22 0605 -- 71 23 -- --   09/06/22 0604 -- 78 24 -- 100 %   09/06/22 0603 -- 71 24 -- 98 %   09/06/22 0602 -- 72 20 -- 98 %   09/06/22 0601 -- 79 18 -- 100 %   09/06/22 0600 -- 70 25 (!) 125/92 98 %   09/06/22 0559 -- 76 22 -- 100 %   09/06/22 0558 -- 71 19 -- 98 %   09/06/22 0557 -- 74 24 -- 100 %   09/06/22 0556 -- 73 20 -- 100 %   09/06/22 0555 -- 75 19 -- 100 %   09/06/22 0554 -- 75 22 -- 100 %   09/06/22 0553 -- 71 21 -- 100 %   09/06/22 0552 -- 70 23 -- 100 %   09/06/22 0551 -- 73 24 -- 100 %   09/06/22 0550 -- 79 23 -- 100 %   09/06/22 0549 -- 73 21 -- 100 %   09/06/22 0548 -- 80 22 -- 100 %   09/06/22 0547 -- 76 22 -- 99 %   09/06/22 0546 -- 72 23 -- --   09/06/22 0545 -- 80 22 -- 100 %   09/06/22 0544 -- 79 21 -- 100 %   09/06/22 0543 -- 80 21 -- 100 %   09/06/22 0542 -- 70 22 -- 100 %   09/06/22 0541 -- 74 25 -- 100 %   09/06/22 0540 -- 72 24 -- 100 %   09/06/22 0539 -- 78 23 -- 100 %   09/06/22 0538 -- 73 23 -- 100 %   09/06/22 0537 -- 76 19 -- 100 %   09/06/22 0536 -- 70 22 -- 99 %   09/06/22 0535 -- 77 24 -- 100 %   09/06/22 0534 -- 72 23 -- 100 %   09/06/22 0533 -- 75 23 -- 100 %   09/06/22 0532 -- 70 24 -- 100 %   09/06/22 0531 -- 77 23 -- 100 %   09/06/22 0530 -- 77 24 -- 100 %   09/06/22 0529 -- 70 24 -- 100 %   09/06/22 0528 -- 70 24 -- 99 %   09/06/22 0527 -- 72 23 -- 100 %   09/06/22 0526 -- 71 23 -- 99 %   09/06/22 0525 -- 71 23 -- 99 %   09/06/22 0524 -- 73 24 -- 99 %   09/06/22 0523 -- 72 24 -- 100 %   09/06/22 0522 -- 71 24 -- 100 %   09/06/22 0521 -- 75 25 -- 100 %   09/06/22 0520 -- 71 24 -- 99 %   09/06/22 0519 -- 71 25 -- 100 %   09/06/22 0518 -- 70 23 -- 99 %   09/06/22 0517 -- 70 25 -- 100 %   09/06/22 0516 -- 73 23 -- 100 %   09/06/22 0515 -- 73 23 -- 99 %   09/06/22 0514 -- 73 23 -- 100 %   09/06/22 0513 -- 74 21 -- 100 %   09/06/22 0512 -- 69 23 -- 100 %   09/06/22 0511 -- 72 24 -- 100 %   09/06/22 0510 -- 73 27 -- 100 %   09/06/22 0509 -- 70 25 -- 100 % 09/06/22 0508 -- 74 26 -- 99 %   09/06/22 0507 -- 70 22 -- 100 %   09/06/22 0506 -- 70 26 -- 100 %   09/06/22 0505 -- 74 26 -- 100 %   09/06/22 0504 -- 75 26 -- 100 %   09/06/22 0503 -- 71 23 -- 99 %   09/06/22 0502 -- 70 23 -- 100 %   09/06/22 0501 -- 77 24 -- 100 %   09/06/22 0500 -- 72 25 (!) 128/110 100 %   09/06/22 0459 -- 71 (!) 32 -- 100 %   09/06/22 0458 -- 69 23 -- 97 %   09/06/22 0457 -- 79 23 -- 98 %   09/06/22 0456 -- 70 22 -- 98 %   09/06/22 0455 -- 75 17 -- 97 %   09/06/22 0454 -- 76 25 -- 94 %   09/06/22 0453 -- 70 26 -- 99 %   09/06/22 0452 -- 72 19 -- 100 %   09/06/22 0451 -- 72 21 -- 98 %   09/06/22 0450 -- 72 21 -- 99 %   09/06/22 0449 -- 70 24 -- 99 %   09/06/22 0448 -- 74 25 -- 100 %   09/06/22 0447 -- 74 23 -- 100 %   09/06/22 0446 -- 72 21 -- 100 %   09/06/22 0445 -- 73 24 -- 100 %   09/06/22 0444 -- 72 23 -- 100 %   09/06/22 0443 -- 70 24 -- 100 %   09/06/22 0442 -- 74 19 -- 100 %   09/06/22 0441 -- 70 18 -- 100 %   09/06/22 0440 -- 73 28 -- 100 %   09/06/22 0439 -- 70 26 -- 100 %   09/06/22 0438 -- 70 20 -- 100 %   09/06/22 0437 -- 74 23 -- 100 %   09/06/22 0436 -- 70 17 -- 100 %   09/06/22 0435 -- 75 25 -- 100 %   09/06/22 0434 -- 71 26 -- 100 %   09/06/22 0433 -- 70 26 -- 100 %   09/06/22 0432 -- 73 23 -- 100 %   09/06/22 0431 -- 73 24 -- 100 %   09/06/22 0430 -- 70 23 -- 100 %   09/06/22 0429 -- 72 22 -- 100 %   09/06/22 0428 -- 72 23 -- 100 %   09/06/22 0427 -- 73 23 -- 100 %   09/06/22 0426 -- 71 22 -- 100 %   09/06/22 0425 97.8 °F (36.6 °C) 71 22 (!) 143/93 100 %   09/06/22 0000 97.2 °F (36.2 °C) 70 24 (!) 150/82 97 %         Intake/Output Summary (Last 24 hours) at 9/6/2022 2232  Last data filed at 9/6/2022 0425  Gross per 24 hour   Intake 673.75 ml   Output --   Net 673.75 ml          Wt Readings from Last 12 Encounters:   09/05/22 75.3 kg (166 lb 0.1 oz)   09/21/21 77.5 kg (170 lb 12.8 oz)   08/13/21 76.7 kg (169 lb)   05/01/17 81.6 kg (180 lb)   04/20/17 81.6 kg (180 lb) 04/14/17 81.6 kg (180 lb)   04/10/17 82.6 kg (182 lb)   06/26/16 89 kg (196 lb 3.4 oz)   10/12/15 86.2 kg (190 lb)   08/15/15 93.8 kg (206 lb 12.8 oz)   06/30/15 90.7 kg (200 lb)   05/15/15 91.6 kg (201 lb 14.4 oz)       PHYSICAL EXAM:    I had a face to face encounter and independently examined this patient on 09/06/22 as outlined below:    General: WD, WN. Lethargic, no seizure activity,, no acute distress    EENT:  PERRL. Anicteric sclerae. MMM  Resp:  CTA bilaterally, no wheezing or rales. No accessory muscle use  CV:  Regular  rhythm,  No edema  GI:  Soft, Non distended, Non tender. +hypoactive Bowel sounds, no rebound  Neurologic:  Lethargic, opens eyes when stimulated, not following commands  Psych:   Not anxious nor agitated  Skin:  No rashes. No jaundice    Reviewed most current lab test results and cultures  YES  Reviewed most current radiology test results   YES  Review and summation of old records today    NO  Reviewed patient's current orders and MAR    YES  PMH/ reviewed - no change compared to H&P  ________________________________________________________________________  Care Plan discussed with:    Comments   Patient x    Family      RN x    Care Manager     Consultant                        Multidiciplinary team rounds were held today with , nursing, pharmacist and clinical coordinator. Patient's plan of care was discussed; medications were reviewed and discharge planning was addressed. ________________________________________________________________________      Comments   >50% of visit spent in counseling and coordination of care     ________________________________________________________________________  Shannon Acosta MD     Procedures: see electronic medical records for all procedures/Xrays and details which were not copied into this note but were reviewed prior to creation of Plan. LABS:  I reviewed today's most current labs and imaging studies.   Pertinent labs include:  Recent Labs     09/06/22  0242 09/05/22  0010 09/04/22  0930   WBC 10.0 12.9* 14.9*   HGB 13.4 15.1 13.3   HCT 42.2 48.2 42.6   PLT 93* 109* 96*       Recent Labs     09/06/22  0242 09/05/22  0414 09/05/22  0010 09/04/22  0930   *  --  143 143   K 3.5  --  3.7 3.9   *  --  113* 112*   CO2 25  --  26 25   *  --  92 112*   BUN 28*  --  31* 31*   CREA 2.16*  --  2.45* 2.49*   CA 8.6  --  9.2 8.6   MG 1.7  --   --   --    ALB 3.0*  --  3.5 3.0*   TBILI 5.0*  --  4.3* 2.6*   ALT 1,977*  --  1,115* 492*   INR  --  2.1* 2.3*  --

## 2022-09-07 NOTE — PROGRESS NOTES
Problem: Aspiration - Risk of  Goal: *Absence of aspiration  Outcome: Progressing Towards Goal     Problem: Patient Education: Go to Patient Education Activity  Goal: Patient/Family Education  Outcome: Progressing Towards Goal     Problem: Pressure Injury - Risk of  Goal: *Prevention of pressure injury  Description: Document Ralph Scale and appropriate interventions in the flowsheet.   Note: Pressure Injury Interventions:  Sensory Interventions: Assess changes in LOC, Check visual cues for pain, Keep linens dry and wrinkle-free    Moisture Interventions: Absorbent underpads, Limit adult briefs, Internal/External urinary devices    Activity Interventions: Assess need for specialty bed    Mobility Interventions: Assess need for specialty bed    Nutrition Interventions: Document food/fluid/supplement intake    Friction and Shear Interventions: Minimize layers                Problem: Patient Education: Go to Patient Education Activity  Goal: Patient/Family Education  Outcome: Progressing Towards Goal

## 2022-09-07 NOTE — PROGRESS NOTES
Transition of Care Plan:    RUR:  16% moderate risk  Disposition:  Return to [facility] (LTC)  Follow up appointments: to be arranged by SNF  DME needed: to be provided by SNF  Transportation at Discharge: [facility] transport  [place] or means to access home:  going to SNF (LTC)      IM Medicare Letter:  2nd IMM letter needed  Caregiver Contact:  Madison Wu, [phone]  Discharge Caregiver contacted prior to discharge? CG will be contacted regarding discharge plan  Care Conference needed?:   n/a    CM reviewed chart. Plan for patient to return back to [facility] for continued LTC. Referral sent through [facility], waiting on response from [place]. Care Management Interventions  PCP Verified by CM: Yes  Palliative Care Criteria Met (RRAT>21 & CHF Dx)?: No  Mode of Transport at Discharge: [facility]  Transition of Care Consult (CM Consult): Discharge Planning, [address]: No  Discharge Durable Medical Equipment: No  Health Maintenance Reviewed: Yes  Physical Therapy Consult: No  Occupational Therapy Consult: No  Speech Therapy Consult: No  Support Systems: Other (Comment)David  Confirm Follow Up Transport: Other (see comment)  [facility] Provided?: No  Discharge Location  Patient Expects to be Discharged to[de-identified] [facility] [address]is [address], [address] - [address] [place]  [employer] ACP Facilitator  Zone Phone: [phone]

## 2022-09-08 LAB
ANION GAP SERPL CALC-SCNC: 6 MMOL/L (ref 5–15)
BACTERIA SPEC CULT: NORMAL
BUN SERPL-MCNC: 22 MG/DL (ref 6–20)
BUN/CREAT SERPL: 13 (ref 12–20)
CALCIUM SERPL-MCNC: 8.8 MG/DL (ref 8.5–10.1)
CHLORIDE SERPL-SCNC: 116 MMOL/L (ref 97–108)
CO2 SERPL-SCNC: 24 MMOL/L (ref 21–32)
COVID-19 RAPID TEST, COVR: NOT DETECTED
CREAT SERPL-MCNC: 1.66 MG/DL (ref 0.7–1.3)
DATE LAST DOSE: NORMAL
GLUCOSE BLD STRIP.AUTO-MCNC: 82 MG/DL (ref 65–117)
GLUCOSE BLD STRIP.AUTO-MCNC: 92 MG/DL (ref 65–117)
GLUCOSE BLD STRIP.AUTO-MCNC: 93 MG/DL (ref 65–117)
GLUCOSE BLD STRIP.AUTO-MCNC: 94 MG/DL (ref 65–117)
GLUCOSE SERPL-MCNC: 103 MG/DL (ref 65–100)
MAGNESIUM SERPL-MCNC: 1.7 MG/DL (ref 1.6–2.4)
POTASSIUM SERPL-SCNC: 3.7 MMOL/L (ref 3.5–5.1)
REPORTED DOSE,DOSE: NORMAL UNITS
REPORTED DOSE/TIME,TMG: NORMAL
SERVICE CMNT-IMP: NORMAL
SODIUM SERPL-SCNC: 146 MMOL/L (ref 136–145)
SOURCE, COVRS: NORMAL
VANCOMYCIN PEAK SERPL-MCNC: 26.5 UG/ML (ref 20–40)

## 2022-09-08 PROCEDURE — 36415 COLL VENOUS BLD VENIPUNCTURE: CPT

## 2022-09-08 PROCEDURE — 82962 GLUCOSE BLOOD TEST: CPT

## 2022-09-08 PROCEDURE — 74011000258 HC RX REV CODE- 258: Performed by: INTERNAL MEDICINE

## 2022-09-08 PROCEDURE — 65270000046 HC RM TELEMETRY

## 2022-09-08 PROCEDURE — 87635 SARS-COV-2 COVID-19 AMP PRB: CPT

## 2022-09-08 PROCEDURE — 74011250636 HC RX REV CODE- 250/636: Performed by: INTERNAL MEDICINE

## 2022-09-08 PROCEDURE — 74011250637 HC RX REV CODE- 250/637: Performed by: INTERNAL MEDICINE

## 2022-09-08 PROCEDURE — 74011250636 HC RX REV CODE- 250/636: Performed by: STUDENT IN AN ORGANIZED HEALTH CARE EDUCATION/TRAINING PROGRAM

## 2022-09-08 PROCEDURE — 80202 ASSAY OF VANCOMYCIN: CPT

## 2022-09-08 PROCEDURE — 83735 ASSAY OF MAGNESIUM: CPT

## 2022-09-08 PROCEDURE — 80048 BASIC METABOLIC PNL TOTAL CA: CPT

## 2022-09-08 PROCEDURE — 74011000258 HC RX REV CODE- 258: Performed by: STUDENT IN AN ORGANIZED HEALTH CARE EDUCATION/TRAINING PROGRAM

## 2022-09-08 PROCEDURE — 74011000250 HC RX REV CODE- 250: Performed by: STUDENT IN AN ORGANIZED HEALTH CARE EDUCATION/TRAINING PROGRAM

## 2022-09-08 RX ORDER — VALSARTAN 40 MG/1
40 TABLET ORAL DAILY
Status: DISCONTINUED | OUTPATIENT
Start: 2022-09-08 | End: 2022-09-09 | Stop reason: HOSPADM

## 2022-09-08 RX ADMIN — THIAMINE HYDROCHLORIDE 200 MG: 100 INJECTION, SOLUTION INTRAMUSCULAR; INTRAVENOUS at 08:16

## 2022-09-08 RX ADMIN — CASTOR OIL AND BALSAM, PERU: 788; 87 OINTMENT TOPICAL at 08:16

## 2022-09-08 RX ADMIN — CASTOR OIL AND BALSAM, PERU: 788; 87 OINTMENT TOPICAL at 23:00

## 2022-09-08 RX ADMIN — METRONIDAZOLE 500 MG: 500 INJECTION, SOLUTION INTRAVENOUS at 22:53

## 2022-09-08 RX ADMIN — SODIUM CHLORIDE, PRESERVATIVE FREE 10 ML: 5 INJECTION INTRAVENOUS at 14:22

## 2022-09-08 RX ADMIN — SODIUM CHLORIDE 1 G: 900 INJECTION INTRAVENOUS at 06:16

## 2022-09-08 RX ADMIN — CARVEDILOL 6.25 MG: 6.25 TABLET, FILM COATED ORAL at 08:16

## 2022-09-08 RX ADMIN — SODIUM CHLORIDE, PRESERVATIVE FREE 10 ML: 5 INJECTION INTRAVENOUS at 08:17

## 2022-09-08 RX ADMIN — VANCOMYCIN HYDROCHLORIDE 1000 MG: 1 INJECTION, POWDER, LYOPHILIZED, FOR SOLUTION INTRAVENOUS at 10:18

## 2022-09-08 RX ADMIN — LEVETIRACETAM 750 MG: 100 SOLUTION ORAL at 09:40

## 2022-09-08 RX ADMIN — METRONIDAZOLE 500 MG: 500 INJECTION, SOLUTION INTRAVENOUS at 11:42

## 2022-09-08 RX ADMIN — VALSARTAN 40 MG: 40 TABLET, FILM COATED ORAL at 11:42

## 2022-09-08 RX ADMIN — CARVEDILOL 6.25 MG: 6.25 TABLET, FILM COATED ORAL at 17:19

## 2022-09-08 RX ADMIN — SODIUM CHLORIDE, PRESERVATIVE FREE 10 ML: 5 INJECTION INTRAVENOUS at 23:01

## 2022-09-08 NOTE — PROGRESS NOTES
Spiritual Care Assessment/Progress Note  [facility]      NAME: Yessy Dueñas      MRN: [id number]  AGE: 60 y.o. SEX: male  Yazidism Affiliation: Bahai   Language: English     9/8/2022     Total Time (in minutes): 10     Spiritual Assessment begun in [facility] through conversation with:         [x]Patient        [] Family    [] Friend(s)        Reason for Consult: Initial/Spiritual assessment, patient floor     Spiritual beliefs: (Please include comment if needed)     [] Identifies with a raffaele tradition:         [] Supported by a raffaele community:            [] Claims no spiritual orientation:           [] Seeking spiritual identity:                [] Adheres to an individual form of spirituality:           [x] Not able to assess:                           Identified resources for coping:      [] Prayer                               [] Music                  [] Guided Imagery     [] Family/friends                 [] Pet visits     [] Devotional reading                         [x] Unknown     [] Other:                                                Interventions offered during this visit: (See comments for more details)    Patient Interventions: Initial/Spiritual assessment, patient floor           Plan of Care:     [] Support spiritual and/or cultural needs    [] Support AMD and/or advance care planning process      [] Support grieving process   [] Coordinate Rites and/or Rituals    [] Coordination with community clergy   [] No spiritual needs identified at this time   [] Detailed Plan of Care below (See Comments)  [] Make referral to Music Therapy  [] Make referral to Pet Therapy     [] Make referral to Addiction services  [] Make referral to [facility]  [] Make referral to Spiritual Care Partner  [] No future visits requested        [x] Contact Spiritual Care for further referrals     Comments:  Visit was in 2242 for initial spiritual assessment.  Patient is non verbal but appeared to understand  and nodded in response to questions. There was a nurse attending to patient at time of visit.  introduced self and role and advised patient of spiritual care availability for support through staff referrals.  He thanked  for the visit,      Visited by: Devon gonzalez : Linda Medellin  ([id number])

## 2022-09-08 NOTE — PROGRESS NOTES
0800: Spoke to Dr. Marcia Nair via phone, not planning for patient to have YULIANA/cardioversion today, patient ok to eat.  Will resume diet orders per MD.

## 2022-09-08 NOTE — PROGRESS NOTES
Tele reported patient had 10 beats of vtach. Patient asymptomatic. Dr Akira Vargas notified via perfect serve. Per MD will check labs in am, no further recommendations from cardiology.

## 2022-09-08 NOTE — PROGRESS NOTES
Hospitalist Progress Note    NAME: Jadiel Armenta   :  1965   MRN:  [id number]     Admit date: 2022    Today's date: 22    PCP: Jose Rangel MD    Anticipated discharge date: 2022    Barriers: none,     Assessment / Plan:    Tonic-clonic seizure POA no recurrence  Acute metabolic encephalopathy POA ? Post-ictal, ? Hepatic encephalopathy, ? Sepsis, hypoglycemia  Hypoglycemia glucose 23 on 9/3/2022 POA resolved  Baseline aphasia and right hemiparesis secondary to old CVA POA  Leukocytosis POA elevated procalcitonin  18.95, unclear source  Lactic acidosis lactate POA 7.69 --> 1.7   Abnormal LFTs - trending down  Lactic acidosis may relate to seizure and poor hepatic clearance  Prior on seizure meds, not at admit  Now with recurrent seizure. Lethargic after admit, slow to wake up  Definitely improved from admit  Known cirrhosis, ammonia elevated, ? Clinical significance   Improved with rectal lactulose, change to PO  Continue keppra- changed to Liquid form as pt can only take crushed meds and Keppra pill cant be crushed per [facility]  Dr Christal Hanson followed  EEG abnormal.   Mild to moderate generalized slowing as seen in encephalopathies. There is no focal asymmetry, seizures or epileptiform discharges seen. Elevated procalcitonin 18.95 suggests infection  CXR with no ASD  CT abdomen/pelvis IMPRESSION  1. Study without oral or IV contrast with resultant diminished assess stability  of bowel, vasculature and solid organs. 2. Moderate cardiomegaly with evidence for right heart failure and superior vena  cava passive venous congestion. 3. Cirrhosis. Perihepatic ascites. 4. Loop of left mid abdominal small bowel with equivocal mural thickening. 5. Equivocal pneumatosis in cecum. 6. Air-fluid levels within colon demonstrated distally  7.  ASD at [facility] negative  UA 10-20 WBC, 10-20 RBC, 1+ bacteria   Urine culture negative  Empiric vanco, cefepime, flagyl  BS dropped to 23 on 9/3/2022, ? stress and poor hepatic function   Resolved on D5W  IVF with dextrose - taper to off in next 24-48 hrs  as pt eats more  Appreciate Gi input- signed off, OP followup with Hepatology Dr Kym Monteiro  Acute hepatitis panel negative    Cirrhosis POA  Hyperammonemia NH4 113 --> 14 now  Hyperbilirubinemia POA  Coagulopathy INR 1.7 POA  Alcohol abuse POA  Trace perihepatic ascites on CT scan  Serial levels, lactulose enemas, NH improved  Ammonia improved on lactulose enemas   Changed to PO lactulose     Acute kidney injury POA creatinine 2.26- Cr improved to 1.7 today  Hypokalemia- K+ 3.7, resolved now  Last baseline 1 year ago was 1.62  IVF  Serial labs  Check urine NA and creat  No hydronephrosis on the CT scan      Elevated troponin 299 in setting of sepsis, seizure  Atrial fibrillation  Essential hypertension  Idiopathic hypertrophic subaortic stenosis  Status post PPM-unclear indication (tachybradycardia syndrome?)  Echo LVEF 15-20%  Troponin trending down     Code Status: Full     DVT Prophylaxis: Lovenox  GI Prophylaxis: not indicated  Diet: Dysphagia diet now    Body mass index is 25.69 kg/m². Subjective:     Chief Complaint / Reason for Physician Visit   Discussed with RN events overnight. Baseline non verbal, opening eyes to voice, much more responsive  Non sustained VT overnight  LFTs continue to worsen  Not able to provide any history    Review of Systems:  Symptom Y/N Comments  Symptom Y/N Comments   Fever/Chills n   Chest Pain     Poor Appetite    Edema     Cough    Abdominal Pain     Sputum    Joint Pain     SOB/CARDOZA    Headache     Nausea/vomit    Tolerating PT/OT     Diarrhea    Tolerating Diet     Constipation    Other       Could NOT obtain due to: Non verbal     Objective:     VITALS:   Last 24hrs VS reviewed since prior progress note.  Most recent are:  Patient Vitals for the past 24 hrs:   Temp Pulse Resp BP SpO2   09/08/22 1506 97.3 °F (36.3 °C) 73 22 129/70 99 %   09/08/22 1400 -- 74 23 (!) 142/82 99 %   09/08/22 1101 98 °F (36.7 °C) 71 22 (!) 147/81 98 %   09/08/22 0721 97.9 °F (36.6 °C) 74 16 (!) 159/76 100 %   09/08/22 0410 98 °F (36.7 °C) 70 18 (!) 165/83 100 %   09/07/22 2347 98 °F (36.7 °C) 70 16 (!) 162/88 100 %   09/07/22 2100 -- 70 -- (!) 159/90 100 %   09/07/22 2000 97.9 °F (36.6 °C) 70 18 (!) 152/69 97 %         Intake/Output Summary (Last 24 hours) at 9/8/2022 1554  Last data filed at 9/8/2022 1400  Gross per 24 hour   Intake 240 ml   Output 700 ml   Net -460 ml          Wt Readings from Last 12 Encounters:   09/08/22 74.4 kg (164 lb 0.4 oz)   09/21/21 77.5 kg (170 lb 12.8 oz)   08/13/21 76.7 kg (169 lb)   05/01/17 81.6 kg (180 lb)   04/20/17 81.6 kg (180 lb)   04/14/17 81.6 kg (180 lb)   04/10/17 82.6 kg (182 lb)   06/26/16 89 kg (196 lb 3.4 oz)   10/12/15 86.2 kg (190 lb)   08/15/15 93.8 kg (206 lb 12.8 oz)   06/30/15 90.7 kg (200 lb)   05/15/15 91.6 kg (201 lb 14.4 oz)       PHYSICAL EXAM:    I had a face to face encounter and independently examined this patient on 09/08/22 as outlined below:    General: WD, WN. Lethargic, no seizure activity,, no acute distress    EENT:  PERRL. Anicteric sclerae. MMM  Resp:  CTA bilaterally, no wheezing or rales. No accessory muscle use  CV:  Regular  rhythm,  No edema  GI:  Soft, Non distended, Non tender. +hypoactive Bowel sounds, no rebound  Neurologic:  Lethargic, opens eyes when stimulated, not following commands  Psych:   Not anxious nor agitated  Skin:  No rashes.   No jaundice    Reviewed most current lab test results and cultures  YES  Reviewed most current radiology test results   YES  Review and summation of old records today    NO  Reviewed patient's current orders and MAR    YES  PMH/SH reviewed - no change compared to H&P  ________________________________________________________________________  Care Plan discussed with:    Comments   Patient x    Family      RN x    Care Manager x    Consultant                       x Multidiciplinary team rounds were held today with , nursing, pharmacist and clinical coordinator. Patient's plan of care was discussed; medications were reviewed and discharge planning was addressed. ________________________________________________________________________  Total Time: 16 mins    Comments   >50% of visit spent in counseling and coordination of care x    ________________________________________________________________________  Herbie Borrero MD     Procedures: see electronic medical records for all procedures/Xrays and details which were not copied into this note but were reviewed prior to creation of Plan. LABS:  I reviewed today's most current labs and imaging studies.   Pertinent labs include:  Recent Labs     09/07/22  0021 09/06/22  0242   WBC 7.4 10.0   HGB 13.9 13.4   HCT 44.2 42.2   PLT 97* 93*       Recent Labs     09/08/22  0413 09/07/22  0021 09/06/22  0242   * 148* 147*   K 3.7 3.1* 3.5   * 119* 119*   CO2 24 26 25   * 111* 123*   BUN 22* 25* 28*   CREA 1.66* 1.85* 2.16*   CA 8.8 8.7 8.6   MG 1.7  --  1.7   ALB  --  3.0* 3.0*   TBILI  --  5.1* 5.0*   ALT  --  1,456* 1,977*

## 2022-09-08 NOTE — PROGRESS NOTES
End of Shift Note    Bedside shift change report given to Deanna RN (oncoming nurse) by Concepcion Contreras RN (offgoing nurse). Report included the following information SBAR, Kardex, Intake/Output, MAR, and Recent Results    Shift worked:  7a-7p     Shift summary and any significant changes:    Lactulose held for loose stools. Goal for 1-2 BM/day per MD.     Rapid covid test negative    Dc tomorrow? Concerns for physician to address:       Zone phone for oncoming shift:          Activity:  Activity Level: Bed Rest  Number times ambulated in hallways past shift: 0  Number of times OOB to chair past shift: 0    Cardiac:   Cardiac Monitoring: Yes      Cardiac Rhythm: Ventricular Paced, PVC    Access:  Current line(s): PIV     Genitourinary:   Urinary status: voiding and external catheter    Respiratory:   O2 Device: None (Room air)  Chronic home O2 use?: NO  Incentive spirometer at bedside: NO       GI:  Last Bowel Movement Date: 09/08/22  Current diet:  ADULT DIET Dysphagia - Pureed  Passing flatus: YES  Tolerating current diet: NO       Pain Management:   Patient states pain is manageable on current regimen: YES    Skin:  Ralph Score: 12  Interventions: float heels    Patient Safety:  Fall Score:  Total Score: 3  Interventions: bed/chair alarm  High Fall Risk: Yes    Length of Stay:  Expected LOS: 4d 19h  Actual LOS: [address], RN

## 2022-09-08 NOTE — PROGRESS NOTES
Bedside shift change report given to Lin Leon and Dixei House (oncoming nurse) by Margot Sprague RN (offgoing nurse). Report included the following information SBAR, Kardex, Intake/Output, MAR, and Quality Measures     Shift worked: 1701-9958      Shift summary and any significant changes:      Pt still experienced several bouts of diarrhea. Concerns for physician to address:  HTN      Zone phone for oncoming shift:            Activity:  Activity Level: Bed Rest  Number times ambulated in hallways past shift: 0  Number of times OOB to chair past shift: 0     Cardiac:   Cardiac Monitoring: Yes      Cardiac Rhythm: Ventricular Paced     Access:  Current line(s): PIV      Genitourinary:   Urinary status: voiding and external catheter     Respiratory:   O2 Device: None (Room air)  Chronic home O2 use?: NO  Incentive spirometer at bedside: NO     GI:  Last Bowel Movement Date: 09/07/22  Current diet:  ADULT DIET Dysphagia - Pureed  Passing flatus: YES  Tolerating current diet: YES     Pain Management:   Patient states pain is manageable on current regimen: N/A     Skin:  Ralph Score: 13  Interventions: float heels and limit briefs    Patient Safety:  Fall Score:  Total Score: 4  Interventions: bed/chair alarm  High Fall Risk: Yes     Length of Stay:  Expected LOS: 4d 19h  Actual LOS: [address]azar, RN

## 2022-09-08 NOTE — PROGRESS NOTES
Problem: Pressure Injury - Risk of  Goal: *Prevention of pressure injury  Description: Document Ralph Scale and appropriate interventions in the flowsheet. Outcome: Progressing Towards Goal  Note: Pressure Injury Interventions:  Sensory Interventions: Assess changes in LOC    Moisture Interventions: Absorbent underpads, Apply protective barrier, creams and emollients, Check for incontinence Q2 hours and as needed    Activity Interventions: PT/OT evaluation    Mobility Interventions: PT/OT evaluation    Nutrition Interventions: Document food/fluid/supplement intake    Friction and Shear Interventions: Feet elevated on foot rest                Problem: Falls - Risk of  Goal: *Absence of Falls  Description: Document Kevyn Fall Risk and appropriate interventions in the flowsheet.   Outcome: Progressing Towards Goal  Note: Fall Risk Interventions:  Mobility Interventions: Bed/chair exit alarm    Mentation Interventions: Adequate sleep, hydration, pain control, Bed/chair exit alarm, Door open when patient unattended    Medication Interventions: Bed/chair exit alarm    Elimination Interventions: Bed/chair exit alarm, Call light in reach, Patient to call for help with toileting needs, Toileting schedule/hourly rounds

## 2022-09-08 NOTE — PROGRESS NOTES
EP/ ARRHYTHMIA Progress Note    Patient ID:  Patient: Josue Reynolds  MRN: [id number]  Age: 60 y.o.  : 1965    Date of  Admission: 2022  9:30 PM   PCP:  Debra Arrington MD    Assessment:   Nonsustained VT reported on tele, no ICD therapy. Typical to have some events like this given the state of his heart. RV apical pacing noted with iatrogenic LBBB. His natural HR is in the 40-50's with narrow QRS, but avoiding RV pacing and allowing the lower HR will immediately compromise his cardiac output. Chronic nonischemic cardiomyopathy LVEF 15%. Last 3 Recorded Weights in this Encounter    22 1200 22 0300 22 0617   Weight: 75.3 kg (166 lb 0.1 oz) 75.9 kg (167 lb 5.3 oz) 74.4 kg (164 lb 0.4 oz)     Chronic biventricular failure based on echo here. Chronic atrial flutter with prior surgical MAZE in . He had recurrence of atrial flutter in 2022 which has sustained. Remote mitral valve repair per notes. Dual chamber ICD upgrade to prior dual chamber pacemaker. There are atrial and ventricular pacemaker leads that appear abandoned on CXR and atrial and ventricular leads connected to the [facility] ICD system at the left chest.  Stroke with residual R-sided weakness and aphasia. Seizure disorder. Chronic anticoagulation. EtOH cirrhosis. Thrombocytopenia. Hypernatremia, mild. Elevated procalcitonin, lactic acidosis, on antibiotics. Full code. Plan:     I interrogated the dual chamber ICD  to get more data on the atrial flutter, V pacing, and see if the seizure was cardiogenic (ventricular arrhythmia). The seizure was NOT cardiogenic, no arrhythmias to explain this as recorded by the device. Regarding the dual chamber ICD, if he is chronically RV apical pacing, he may benefit from BIV-ICD upgrade when the time is right. The RV apical pacing may be deleterious longterm and cause further CHF. Blood cultures negative to date.   Elevated procalcitonin, lactic acidosis, suggestive of bacterial sepsis but no source identified. Treated with antibiotic. Carvedilol added by primary team, cardiac benefit>reducing portal hypertension benefit compared to propranolol. BP better, start valsartan 40 mg po daily. Titrate upward as needed. From a cardiac standpoint, he may benefit from YULIANA-cardioversion, can be done prior to discharge when he's optimized or even as an OP after he's proven to be on uninterrupted anticoagulation for at least 3 weeks. He's been in atrial flutter since June. [x]       High complexity decision making was performed in this patient at high risk for decompensation. Raquel Davis is a 60 y.o. male with a history of the above. [facility]izure [facility] at the [facility]. Per the hospitalist H&P:  Juliane Holcomb is a 60 y.o.  male with pertinent medical history as listed below who presents from convalescent home in [place] after witnessed seizure by EMS who provided 4 mg IV Versed with resolution of seizure activity. Blood sugar at that time was around 50 and corrected. EMS states that facility had very little information for them about the patient and patient arrived without any paperwork. Patient reportedly aphasic at baseline and unable to provide any history. History collected exclusively from chart review (limited recent records) and report. In the ED, patient hemodynamically stable (hypertensive 190s/90s improved to 140s/80s). Saturating well on room air. CXR demonstrated enlarged cardiac silhouette, minimal atelectasis in the left base. CT head demonstrated extensive encephalomalacia not significant change. No acute abnormality. Rapid COVID-negative.   Labs demonstrate: Lactic 7.05 on presentation and improved to 4.7, WBC 14.4, hemoglobin 14.7, platelets 295, ammonia 113, ethyl alcohol undetectable, salicylates undetectable, Tylenol undetectable, proBNP 2709 (prior 22,000), high-sensitivity troponin 176 increased to 238, CK2 162. Sodium 134, potassium 5.4, glucose 111, creatinine 2.29 (1.6 to 1-year prior), AST 36 5, ALT 36, alk phos 262. We were asked to admit for work up and evaluation of the above problems. \"       Allergies   Allergen Reactions    Bactrim [Sulfamethoxazole-Trimethoprim] Other (comments)     GI Distress          Current Facility-Administered Medications   Medication Dose Route Frequency    levETIRAcetam (KEPPRA) oral solution 750 mg  750 mg Oral Q12H    Vancomycin level 14:00  AND 9:00    Other DAILY    balsam peru-castor oiL (VENELEX) ointment   Topical BID    carvediloL (COREG) tablet 6.25 mg  6.25 mg Oral BID    lactulose (CHRONULAC) 10 gram/15 mL solution 45 mL  30 g Oral BID    thiamine (B-1) 200 mg in 0.9% sodium chloride 50 mL IVPB  200 mg IntraVENous DAILY    metroNIDAZOLE (FLAGYL) IVPB premix 500 mg  500 mg IntraVENous Q12H    sodium chloride (NS) flush 5-40 mL  5-40 mL IntraVENous Q8H    sodium chloride (NS) flush 5-40 mL  5-40 mL IntraVENous PRN    [Held by provider] acetaminophen (TYLENOL) tablet 650 mg  650 mg Oral Q6H PRN    Or    [Held by provider] acetaminophen (TYLENOL) suppository 650 mg  650 mg Rectal Q6H PRN    polyethylene glycol (MIRALAX) packet 17 g  17 g Oral DAILY PRN    ondansetron (ZOFRAN ODT) tablet 4 mg  4 mg Oral Q8H PRN    Or    ondansetron (ZOFRAN) injection 4 mg  4 mg IntraVENous Q6H PRN    cefTRIAXone (ROCEPHIN) 1 g in 0.9% sodium chloride (MBP/ADV) 50 mL MBP  1 g IntraVENous Q24H    dextrose 10% infusion 0-250 mL  0-250 mL IntraVENous PRN    vancomycin (VANCOCIN) 1,000 mg in 0.9% sodium chloride 250 mL ([id number])  1,000 mg IntraVENous Q24H       Review of Symptoms:  He cannot convey a ROS at this time.      Objective:      Physical Exam:  Temp (24hrs), Av °F (36.7 °C), Min:97.8 °F (36.6 °C), Max:98.1 °F (36.7 °C)    Patient Vitals for the past 8 hrs:   Pulse   22 0721 74   22 0410 70      Patient Vitals for the past 8 hrs:   Resp   22 0721 16 09/08/22 0410 18      Patient Vitals for the past 8 hrs:   BP   09/08/22 0721 (!) 159/76   09/08/22 0410 (!) 165/83          Intake/Output Summary (Last 24 hours) at 9/8/2022 0914  Last data filed at 9/8/2022 0617  Gross per 24 hour   Intake --   Output 700 ml   Net -700 ml         Nondiaphoretic, not in acute distress. No scleral icterus, mucous membranes moist, conjuctivae pink, no xanthelasma. Unlabored, clear to auscultation bilaterally anteriorly, symmetric air movement. Regular rate and rhythm, no murmur, pericardial rub, knock, or gallop. No peripheral edema. Palpable radial pulses bilaterally. Abdomen, soft, nontender, nondistended. Extremities without cyanosis or clubbing. Skin warm and dry. No rashes or ulcers. Awake, hard to tell if appropriate given the aphasia. CARDIOGRAPHICS and STUDIES, I reviewed:    Telemetry:  Currently ventricular pacing. ECG:  Could not open the ECG in the viewer. Labs:  No results for input(s): CPK, CKMB, CKNDX, TROIQ in the last 72 hours. No lab exists for component: CPKMB    No results found for: CHOL, CHOLX, CHLST, CHOLV, HDL, HDLP, LDL, LDLC, DLDLP, TGLX, TRIGL, TRIGP, CHHD, CHHDX  No results for input(s): INR, PTP, APTT, INREXT, INREXT in the last 72 hours. Recent Labs     09/08/22 0413 09/07/22 0021 09/06/22 0242   * 148* 147*   K 3.7 3.1* 3.5   * 119* 119*   CO2 24 26 25   BUN 22* 25* 28*   CREA 1.66* 1.85* 2.16*   * 111* 123*   CA 8.8 8.7 8.6   ALB  --  3.0* 3.0*   WBC  --  7.4 10.0   HGB  --  13.9 13.4   HCT  --  44.2 42.2   PLT  --  97* 93*       Recent Labs     09/07/22 0021 09/06/22 0242   * 189*   TP 7.3 7.0   ALB 3.0* 3.0*   GLOB 4.3* 4.0       No components found for: GLPOC  No results for input(s): PH, PCO2, PO2 in the last 72 hours.         Roula Hagen MD  9/8/2022

## 2022-09-08 NOTE — PROGRESS NOTES
Problem: Pressure Injury - Risk of  Goal: *Prevention of pressure injury  Description: Document Ralph Scale and appropriate interventions in the flowsheet. Outcome: Progressing Towards Goal  Note: Pressure Injury Interventions:  Sensory Interventions: Assess changes in LOC, Check visual cues for pain, Keep linens dry and wrinkle-free, Float heels, Maintain/enhance activity level, Minimize linen layers, Turn and reposition approx.  every two hours (pillows and wedges if needed)    Moisture Interventions: Absorbent underpads, Apply protective barrier, creams and emollients, Check for incontinence Q2 hours and as needed, Internal/External urinary devices, Minimize layers    Activity Interventions: Pressure redistribution bed/mattress(bed type)    Mobility Interventions: Assess need for specialty bed, Float heels, Pressure redistribution bed/mattress (bed type)    Nutrition Interventions: Document food/fluid/supplement intake, Offer support with meals,snacks and hydration, Discuss nutritional consult with provider    Friction and Shear Interventions: Apply protective barrier, creams and emollients, Minimize layers

## 2022-09-09 VITALS
BODY MASS INDEX: 27.85 KG/M2 | OXYGEN SATURATION: 98 % | HEIGHT: 67 IN | HEART RATE: 70 BPM | TEMPERATURE: 98 F | WEIGHT: 177.47 LBS | SYSTOLIC BLOOD PRESSURE: 129 MMHG | RESPIRATION RATE: 22 BRPM | DIASTOLIC BLOOD PRESSURE: 83 MMHG

## 2022-09-09 LAB
ALBUMIN SERPL-MCNC: 2.9 G/DL (ref 3.5–5)
ALBUMIN/GLOB SERPL: 0.8 {RATIO} (ref 1.1–2.2)
ALP SERPL-CCNC: 189 U/L (ref 45–117)
ALT SERPL-CCNC: 720 U/L (ref 12–78)
AST SERPL-CCNC: 142 U/L (ref 15–37)
BILIRUB DIRECT SERPL-MCNC: 2.6 MG/DL (ref 0–0.2)
BILIRUB SERPL-MCNC: 3.8 MG/DL (ref 0.2–1)
GLOBULIN SER CALC-MCNC: 3.7 G/DL (ref 2–4)
GLUCOSE BLD STRIP.AUTO-MCNC: 102 MG/DL (ref 65–117)
GLUCOSE BLD STRIP.AUTO-MCNC: 90 MG/DL (ref 65–117)
PROT SERPL-MCNC: 6.6 G/DL (ref 6.4–8.2)
SERVICE CMNT-IMP: NORMAL
SERVICE CMNT-IMP: NORMAL

## 2022-09-09 PROCEDURE — 80076 HEPATIC FUNCTION PANEL: CPT

## 2022-09-09 PROCEDURE — 74011250637 HC RX REV CODE- 250/637: Performed by: INTERNAL MEDICINE

## 2022-09-09 PROCEDURE — 74011000258 HC RX REV CODE- 258: Performed by: INTERNAL MEDICINE

## 2022-09-09 PROCEDURE — 74011000258 HC RX REV CODE- 258: Performed by: STUDENT IN AN ORGANIZED HEALTH CARE EDUCATION/TRAINING PROGRAM

## 2022-09-09 PROCEDURE — 36415 COLL VENOUS BLD VENIPUNCTURE: CPT

## 2022-09-09 PROCEDURE — 74011250636 HC RX REV CODE- 250/636: Performed by: STUDENT IN AN ORGANIZED HEALTH CARE EDUCATION/TRAINING PROGRAM

## 2022-09-09 PROCEDURE — 82962 GLUCOSE BLOOD TEST: CPT

## 2022-09-09 PROCEDURE — 74011000250 HC RX REV CODE- 250: Performed by: STUDENT IN AN ORGANIZED HEALTH CARE EDUCATION/TRAINING PROGRAM

## 2022-09-09 PROCEDURE — 74011250636 HC RX REV CODE- 250/636: Performed by: INTERNAL MEDICINE

## 2022-09-09 RX ORDER — FLUOXETINE HYDROCHLORIDE 40 MG/1
40 CAPSULE ORAL
COMMUNITY

## 2022-09-09 RX ORDER — ATORVASTATIN CALCIUM 40 MG/1
40 TABLET, FILM COATED ORAL
COMMUNITY

## 2022-09-09 RX ORDER — ASPIRIN 325 MG
TABLET, DELAYED RELEASE (ENTERIC COATED) ORAL
COMMUNITY

## 2022-09-09 RX ORDER — LEVETIRACETAM 750 MG/1
750 TABLET ORAL DAILY
COMMUNITY

## 2022-09-09 RX ORDER — VALSARTAN 40 MG/1
40 TABLET ORAL DAILY
Qty: 30 TABLET | Refills: 0 | Status: SHIPPED
Start: 2022-09-10

## 2022-09-09 RX ORDER — TRAMADOL HYDROCHLORIDE 50 MG/1
50 TABLET ORAL
COMMUNITY

## 2022-09-09 RX ORDER — CARVEDILOL 12.5 MG/1
12.5 TABLET ORAL 2 TIMES DAILY
Status: DISCONTINUED | OUTPATIENT
Start: 2022-09-09 | End: 2022-09-09 | Stop reason: HOSPADM

## 2022-09-09 RX ORDER — DIVALPROEX SODIUM 125 MG/1
250 TABLET, DELAYED RELEASE ORAL EVERY 12 HOURS
COMMUNITY

## 2022-09-09 RX ADMIN — SODIUM CHLORIDE 1 G: 900 INJECTION INTRAVENOUS at 06:07

## 2022-09-09 RX ADMIN — LACTULOSE 45 ML: 20 SOLUTION ORAL at 09:37

## 2022-09-09 RX ADMIN — THIAMINE HYDROCHLORIDE 200 MG: 100 INJECTION, SOLUTION INTRAMUSCULAR; INTRAVENOUS at 11:22

## 2022-09-09 RX ADMIN — SODIUM CHLORIDE, PRESERVATIVE FREE 10 ML: 5 INJECTION INTRAVENOUS at 06:08

## 2022-09-09 RX ADMIN — VALSARTAN 40 MG: 40 TABLET, FILM COATED ORAL at 09:36

## 2022-09-09 RX ADMIN — CARVEDILOL 12.5 MG: 12.5 TABLET, FILM COATED ORAL at 09:36

## 2022-09-09 RX ADMIN — CASTOR OIL AND BALSAM, PERU: 788; 87 OINTMENT TOPICAL at 09:37

## 2022-09-09 RX ADMIN — LEVETIRACETAM 750 MG: 100 SOLUTION ORAL at 09:37

## 2022-09-09 NOTE — PROGRESS NOTES
Bedside shift change report given to RN (oncoming nurse) by Sergio Ghosh RN (offgoing nurse). Report included the following information SBAR, Kardex, Intake/Output, MAR, and Recent Results     Shift worked:  3155-5319      Shift summary and any significant changes:     No significant changes overnight. Concerns for physician to address:        Zone phone for oncoming shift:            Activity:  Activity Level: Bed Rest  Number times ambulated in hallways past shift: 0  Number of times OOB to chair past shift: 0     Cardiac:   Cardiac Monitoring: Yes      Cardiac Rhythm: Ventricular Paced, PVC     Access:  Current line(s): PIV      Genitourinary:   Urinary status: voiding and external catheter     Respiratory:   O2 Device: None (Room air)  Chronic home O2 use?: NO  Incentive spirometer at bedside: NO     GI:  Last Bowel Movement Date: 09/08/22  Current diet:  ADULT DIET Dysphagia - Pureed  Passing flatus: YES  Tolerating current diet: NO     Pain Management:   Patient states pain is manageable on current regimen: YES     Skin:  Ralph Score: 12  Interventions: float heels    Patient Safety:  Fall Score:  Total Score: 3  Interventions: bed/chair alarm  High Fall Risk: Yes     Length of Stay:  Expected LOS: 4d 19h  Actual LOS: [address], RN

## 2022-09-09 NOTE — DISCHARGE SUMMARY
Hospitalist Discharge Summary     Patient ID:  Josue Reynolds  [id number]  1 y.o.  1965 9/2/2022    PCP on record: Debra Arrington MD    Admit date: 9/2/2022  Discharge date and time: 9/9/2022    DISCHARGE DIAGNOSIS:    Tonic-clonic seizure POA no recurrence  Acute metabolic encephalopathy POA ? Post-ictal +Hepatic encephalopathy,- resolved now  Hypoglycemia glucose 23 on 9/3/2022 POA resolved  Baseline aphasia and right hemiparesis secondary to old CVA POA- cont NOAC for Afib  Leukocytosis POA elevated procalcitonin  18.95, unclear source  Lactic acidosis lactate POA 7.69 --> 1.7   Abnormal LFTs - trending down  Cirrhosis POA  Hyperammonemia NH4 113 --> 14 now, cont Lactulose daily  Hyperbilirubinemia POA  Coagulopathy INR 1.7 POA  Alcohol abuse POA  Acute kidney injury POA creatinine 2.26- Cr improved to 1.6 today, started Diovan  Hypokalemia- K+ 3.7, resolved now  Elevated troponin 299 in setting of sepsis, seizure- trended down  Atrial fibrillation - F/U with Dr Raymon Robertson in 3-4 weeks for YULIANA/DCCV if remains on Anticoag uninterrupted for 3 weeks  Essential hypertension  Idiopathic hypertrophic subaortic stenosis  Chronic nonischemic cardiomyopathy LVEF 15%. Status post PPM/ICD  Full code    CONSULTATIONS:  IP CONSULT TO HOSPITALIST  IP CONSULT TO NEUROLOGY  IP CONSULT TO GASTROENTEROLOGY  IP CONSULT TO CARDIOLOGY    Excerpted HPI from H&P of Tim Dick DO:  [age] y.o.  male with pertinent medical history as listed below who presents from convalescent home in [place] after witnessed seizure by EMS who provided 4 mg IV Versed with resolution of seizure activity. Blood sugar at that time was around 50 and corrected. EMS states that facility had very little information for them about the patient and patient arrived without any paperwork. Patient reportedly aphasic at baseline and unable to provide any history.   History collected exclusively from chart review (limited recent records) and report. In the ED, patient hemodynamically stable (hypertensive 190s/90s improved to 140s/80s). Saturating well on room air. CXR demonstrated enlarged cardiac silhouette, minimal atelectasis in the left base. CT head demonstrated extensive encephalomalacia not significant change. No acute abnormality. Rapid COVID-negative. Labs demonstrate: Lactic 7.05 on presentation and improved to 4.7, WBC 14.4, hemoglobin 14.7, platelets 520, ammonia 113, ethyl alcohol undetectable, salicylates undetectable, Tylenol undetectable, proBNP 2709 (prior 22,000), high-sensitivity troponin 176 increased to 238, CK2 162. Sodium 134, potassium 5.4, glucose 111, creatinine 2.29 (1.6 to 1-year prior), AST 36 5, ALT 36, alk phos 262. We were asked to admit for work up and evaluation of the above problems. \"    ______________________________________________________________________  DISCHARGE SUMMARY/HOSPITAL COURSE:  for full details see H&P, daily progress notes, labs, consult notes. Tonic-clonic seizure POA no recurrence  Acute metabolic encephalopathy POA ? Post-ictal, ? Hepatic encephalopathy, ? Sepsis, hypoglycemia  Hypoglycemia glucose 23 on 9/3/2022 POA resolved  Baseline aphasia and right hemiparesis secondary to old CVA POA  Leukocytosis POA elevated procalcitonin  18.95, unclear source  Lactic acidosis lactate POA 7.69 --> 1.7   Abnormal LFTs - trending down  Lactic acidosis may relate to seizure and poor hepatic clearance  Prior on seizure meds, not at admit  Now with recurrent seizure. Lethargic after admit, slow to wake up  Definitely improved from admit  Known cirrhosis, ammonia elevated, ? Clinical significance              Improved with rectal lactulose, change to PO  Continue keppra- changed to Liquid form as pt can only take crushed meds and Keppra pill cant be crushed per [facility]  Dr eJssy Valdze followed  EEG abnormal.   Mild to moderate generalized slowing as seen in encephalopathies. There is no focal asymmetry, seizures or epileptiform discharges seen. Elevated procalcitonin 18.95 suggests infection  CXR with no ASD  CT abdomen/pelvis IMPRESSION  1. Study without oral or IV contrast with resultant diminished assess stability  of bowel, vasculature and solid organs. 2. Moderate cardiomegaly with evidence for right heart failure and superior vena  cava passive venous congestion. 3. Cirrhosis. Perihepatic ascites. 4. Loop of left mid abdominal small bowel with equivocal mural thickening. 5. Equivocal pneumatosis in cecum. 6. Air-fluid levels within colon demonstrated distally  7. ASD at [facility] negative  UA 10-20 WBC, 10-20 RBC, 1+ bacteria              Urine culture negative  Empiric vanco, cefepime, flagyl  BS dropped to 23 on 9/3/2022, ? stress and poor hepatic function              Resolved on D5W  IVF with dextrose - taper to off in next 24-48 hrs  as pt eats more  Appreciate Gi input- signed off, OP followup with Hepatology Dr Minnie Caballero  Acute hepatitis panel negative     Cirrhosis POA  Hyperammonemia NH4 113 --> 14 now  Hyperbilirubinemia POA  Coagulopathy INR 1.7 POA  Alcohol abuse POA  Trace perihepatic ascites on CT scan  Serial levels, lactulose enemas, NH improved  Ammonia improved on lactulose enemas              Changed to PO lactulose      Acute kidney injury POA creatinine 2.26- Cr improved to 1.7 today  Hypokalemia- K+ 3.7, resolved now  Last baseline 1 year ago was 1.62  IVF  Serial labs  Check urine NA and creat  No hydronephrosis on the CT scan        Elevated troponin 299 in setting of sepsis, seizure-Troponin trending down  Atrial fibrillation  Essential hypertension  Idiopathic hypertrophic subaortic stenosis  Status post PPM-unclear indication (tachybradycardia syndrome?)         Code Status: Full           _______________________________________________________________________  Patient seen and examined by me on discharge day.   Pertinent Findings:  Gen:    Not in distress  Chest: Clear lungs  CVS:   Regular rhythm. No edema  Abd:  Soft, not distended, not tender  Neuro:  Alert, oriented x1, aphasia + (chronic)  _______________________________________________________________________  DISCHARGE MEDICATIONS:   Current Discharge Medication List        START taking these medications    Details   lactulose (CHRONULAC) 10 gram/15 mL solution Take 45 mL by mouth daily. Qty: 1 Bottle, Refills: 0  Start date: 9/10/2022      valsartan (DIOVAN) 40 mg tablet Take 1 Tablet by mouth daily. Qty: 30 Tablet, Refills: 0  Start date: 9/10/2022           CONTINUE these medications which have NOT CHANGED    Details   acetaminophen (Tylenol Extra Strength) 500 mg tablet Take  by mouth every six (6) hours as needed for Pain. senna-docusate (Senna with Docusate Sodium) 8.6-50 mg per tablet Take 1 Tablet by mouth daily. levETIRAcetam (KEPPRA) 100 mg/mL solution 7.5 mL by PEG Tube route two (2) times a day. carvediloL (COREG) 6.25 mg tablet 6.25 mg by PEG Tube route two (2) times a day. levothyroxine (SYNTHROID) 50 mcg tablet 50 mcg by PEG Tube route Daily (before breakfast). dicyclomine (BENTYL) 20 mg tablet 20 mg by PEG Tube route two (2) times daily as needed for Other (for IBS). rivaroxaban (XARELTO) 20 mg tab tablet 20 mg by PEG Tube route daily. atorvastatin (LIPITOR) 20 mg tablet 40 mg by PEG Tube route daily. FLUoxetine (PROZAC) 20 mg capsule 80 mg by PEG Tube route daily.            STOP taking these medications       lisinopriL (PRINIVIL, ZESTRIL) 5 mg tablet Comments:   Reason for Stopping:         lactose-reduced food/fiber (JEVITY 1.2 JODY PO) Comments:   Reason for Stopping:         HYDROcodone-acetaminophen (NORCO) 5-325 mg per tablet Comments:   Reason for Stopping:         furosemide (LASIX) 20 mg tablet Comments:   Reason for Stopping:         mupirocin (BACTROBAN) 2 % ointment Comments:   Reason for Stopping:         ondansetron [facility] ODT) 4 mg disintegrating tablet Comments:   Reason for Stopping:                 Patient Follow Up Instructions: Activity: Activity as tolerated  Diet: Dysphagia diet-pureed      Follow-up with PCP in 2 weeks.  Dr Kevin Rothman in 3 weeks for YULIANA/DCCV if remains on Anticoag uninterrupted for 3 weeks      Follow-up Information       Follow up With Specialties Details Why Contact Info    [address]  [phone]    Kyra Barahona MD [facility]   [address]  [phone]            ________________________________________________________________    Risk of deterioration: Low    Condition at Discharge:  Stable  __________________________________________________________________    Disposition  SNF/LTC    ____________________________________________________________________    Code Status: Full Code  ___________________________________________________________________      Total time in minutes spent coordinating this discharge (includes going over instructions, follow-up, prescriptions, and preparing report for sign off to her PCP) :  >30 minutes    Signed:  Zhou Cortes MD

## 2022-09-09 NOTE — PROGRESS NOTES
Pharmacy Medication Reconciliation     [facility] was interviewed regarding current PTA medication list, use and drug allergies. The patient's use of any other inhalers, topical products, over the counter medications, herbal medications, vitamin products or ophthalmic/nasal/otic medications was questioned. Allergy Update: Bactrim [sulfamethoxazole-trimethoprim]    Recommendations/Findings: The following amendments were made to the patient's active medication list on file at [facility]:   1) Additions:   Atorvastatin 40 mg   Fluoxetine 40 mg   Keppra 750 mg  Divalproex 125 mg  2) Deletions:   Dicyclomine    3) Changes:   COREG by PEG Tube to PO   Xarelto by PEG Tube to PO       Pertinent Findings: [facility] manages patients meds. Prior to Admission Medications   Prescriptions Last Dose Informant Taking? FLUoxetine (PROzac) 40 mg capsule   Yes   Sig: Take 40 mg by mouth nightly. HYDROcodone-acetaminophen (NORCO) 5-325 mg per tablet   No   Sig: Take 1 Tab by mouth every four (4) hours as needed for Pain. Max Daily Amount: 6 Tabs. Patient not taking: Reported on 2021   acetaminophen (TYLENOL) 500 mg tablet   No   Sig: Take  by mouth every eight (8) hours as needed for Pain. atorvastatin (LIPITOR) 40 mg tablet   Yes   Sig: Take 40 mg by mouth nightly. Avoid with grapefruit juice   carvediloL (COREG) 6.25 mg tablet   No   Sig: Take 6.25 mg by mouth two (2) times a day. Indications: high blood pressure   cholecalciferol (VITAMIN D3) (50,000 UNITS /1250 MCG) capsule   Yes   Sig: Take  by mouth every Monday and Friday. Indications: low vitamin D levels   divalproex DR (DEPAKOTE) 125 mg tablet   Yes   Sig: Take 250 mg by mouth every twelve (12) hours. Give 2 capsules every 12 hours   furosemide (LASIX) 20 mg tablet   No   Si mg by PEG Tube route daily.    Patient not taking: Reported on 2021   lactose-reduced food/fiber (JEVITY 1.2 JODY PO)   No Si mL by PEG Tube route every four (4) hours. Patient not taking: Reported on 2021   levETIRAcetam (KEPPRA) 750 mg tablet   Yes   Sig: Take 750 mg by mouth daily. levothyroxine (SYNTHROID) 50 mcg tablet   No   Sig: Take 50 mcg by mouth daily. On an empty stomach, at least 30 minutes before food. Do not take within 4 hours of iron or calcium, avoid medication containing antacids and MVI w min  Indications: a condition with low thyroid hormone levels   lisinopriL (PRINIVIL, ZESTRIL) 5 mg tablet   No   Si mg by PEG Tube route daily. Patient not taking: Reported on 2021   mupirocin (BACTROBAN) 2 % ointment   No   Sig: Apply  to affected area three (3) times daily. Patient not taking: Reported on 2021   ondansetron (ZOFRAN ODT) 4 mg disintegrating tablet   No   Sig: Take 1 Tab by mouth every eight (8) hours as needed for Nausea. Patient taking differently: 1 Tab by PEG Tube route two (2) times daily as needed for Nausea. rivaroxaban (XARELTO) 20 mg tab tablet   No   Sig: Take 20 mg by mouth daily. senna-docusate (Senna with Docusate Sodium) 8.6-50 mg per tablet   No   Sig: Take 1 Tablet by mouth daily. traMADoL (ULTRAM) 50 mg tablet   Yes   Sig: Take 50 mg by mouth every six (6) hours as needed for Pain.       Facility-Administered Medications: None        Thank you,  Rodriguez Mccauley

## 2022-09-09 NOTE — PROGRESS NOTES
Pt ready for discharge from a CM standpoint. Going to [facility] bed [facility]. Nurse to call report to [phone]. [facility] transport to arrive at 1:00pm    Transition of Care Plan:    RUR:  16% moderate risk  Disposition: Return to LTC at [facility]  Follow up appointments:  to be arranged by SNF  DME needed:  to be provided by SNF  Transportation at Discharge: [facility] 1:00pm  Keys or means to access home:  going to [facility] Medicare Letter:  2nd IMM letter mailed to caregiver, patient unable to sign, discussed with family member Carlosse Pritchettrai  Caregiver Contact: Lelia Mercedes [phone]  Discharge Caregiver contacted prior to discharge? Ms. Berkley Quintana was contacted regarding patient's return to LTC and agreeable with plan. Care Conference needed?:  n/a    CM contacted patient's CG to inform of plan to return back to LTC at [facility]. Ms. Berkley Quintana agreeabel to patient's return. CM has contacted  at [place] and confirmed patient's return. CM informed nurse that patient would be ready to return to [place] and that [facility] is set to transport at 1:00pm.    Care Management Interventions  PCP Verified by CM: Yes  Palliative Care Criteria Met (RRAT>21 & CHF Dx)?: No  Mode of Transport at Discharge: S  Transition of Care Consult (CM Consult): Discharge Planning, [address]: No  Discharge Durable Medical Equipment: No  Health Maintenance Reviewed: Yes  Physical Therapy Consult: No  Occupational Therapy Consult: No  Speech Therapy Consult: No  Support Systems: Other (Comment)David  Confirm Follow Up Transport: Other (see comment)  [address] Provided?: No  Discharge Location  Patient Expects to be Discharged to[de-identified] [address]  [facility], [address] - [facility]  [facility] [employer] ACP Facilitator  Zone Phone: [phone]

## 2022-09-09 NOTE — DISCHARGE INSTRUCTIONS
HOSPITALIST DISCHARGE INSTRUCTIONS    NAME: Raquel Davis   :  1965   MRN:  [id number]     Date/Time:  2022 12:30 PM    ADMIT DATE: 2022     DISCHARGE DATE: 2022     DISCHARGE DIAGNOSIS:  Tonic-clonic seizure POA no recurrence  Acute metabolic encephalopathy POA ? Post-ictal +Hepatic encephalopathy,- resolved now  Hypoglycemia glucose 23 on 9/3/2022 POA resolved  Baseline aphasia and right hemiparesis secondary to old CVA POA- cont NOAC for Afib  Leukocytosis POA elevated procalcitonin  18.95, unclear source  Lactic acidosis lactate POA 7.69 --> 1.7   Abnormal LFTs - trending down  Cirrhosis POA  Hyperammonemia NH4 113 --> 14 now, cont Lactulose daily  Hyperbilirubinemia POA  Coagulopathy INR 1.7 POA  Alcohol abuse POA  Acute kidney injury POA creatinine 2.26- Cr improved to 1.6 today, started Diovan  Hypokalemia- K+ 3.7, resolved now  Elevated troponin 299 in setting of sepsis, seizure- trended down  Atrial fibrillation - F/U with Dr Alfredo Fabian in 3-4 weeks for YULIANA/DCCV if remains on Anticoag uninterrupted for 3 weeks  Essential hypertension  Idiopathic hypertrophic subaortic stenosis  Chronic nonischemic cardiomyopathy LVEF 15%. Status post PPM/ICD  Full code    MEDICATIONS:  As per medication reconciliation  list  It is important that you take the medication exactly as they are prescribed. Keep your medication in the bottles provided by the pharmacist and keep a list of the medication names, dosages, and times to be taken in your wallet. Do not take other medications without consulting your doctor. Pain Management: per above medications    What to do at Home    Recommended diet:  Dysphagia diet-pureed    Recommended activity: Activity as tolerated    If you have questions regarding the hospital related prescriptions or hospital related issues please call at [phone].     If you experience any of the following symptoms then please call your primary care physician or return to the emergency room if you cannot get hold of your doctor:  Fever, chills, nausea, vomiting, diarrhea, change in mentation, falling, bleeding, shortness of breath,     Follow Up:  Dr Kevin Rothman in 3-4 weeks if able to remain on Anticoagulation x 3 weeks uninterrupted for possible YULIANA/DCCV as recommended  PCP in 1 week      Information obtained by :  I understand that if any problems occur once I am at home I am to contact my physician. I understand and acknowledge receipt of the instructions indicated above.                                                                                                                                            Physician's or R.N.'s Signature                                                                  Date/Time                                                                                                                                              Patient or Representative Signature                                                          Date/Time

## 2022-09-09 NOTE — PROGRESS NOTES
Bedside and Verbal shift change report given to Brandon Reina (oncoming nurse) by  Beatrice dinero). Report included the following information SBAR, Kardex, Procedure Summary, Intake/Output, MAR, and Recent Results.       1315: Report given to Kamla at [facility]

## 2022-09-28 ENCOUNTER — HOSPITAL ENCOUNTER (EMERGENCY)
Age: 57
Discharge: HOME | End: 2022-09-28
Attending: STUDENT IN AN ORGANIZED HEALTH CARE EDUCATION/TRAINING PROGRAM
Payer: MEDICAID

## 2022-09-28 VITALS
SYSTOLIC BLOOD PRESSURE: 135 MMHG | DIASTOLIC BLOOD PRESSURE: 79 MMHG | RESPIRATION RATE: 16 BRPM | TEMPERATURE: 97.9 F | OXYGEN SATURATION: 100 % | WEIGHT: 180 LBS | HEIGHT: 69 IN | BODY MASS INDEX: 26.66 KG/M2 | HEART RATE: 72 BPM

## 2022-09-28 DIAGNOSIS — Z01.89 ENCOUNTER FOR LABORATORY TEST: Primary | ICD-10-CM

## 2022-09-28 LAB
ALBUMIN SERPL-MCNC: 3.5 G/DL (ref 3.5–5)
ALBUMIN/GLOB SERPL: 0.8 {RATIO} (ref 1.1–2.2)
ALP SERPL-CCNC: 162 U/L (ref 45–117)
ALT SERPL-CCNC: 47 U/L (ref 12–78)
ANION GAP SERPL CALC-SCNC: 6 MMOL/L (ref 5–15)
AST SERPL-CCNC: 34 U/L (ref 15–37)
BASOPHILS # BLD: 0 K/UL (ref 0–0.1)
BASOPHILS NFR BLD: 1 % (ref 0–1)
BILIRUB SERPL-MCNC: 2.8 MG/DL (ref 0.2–1)
BUN SERPL-MCNC: 17 MG/DL (ref 6–20)
BUN/CREAT SERPL: 11 (ref 12–20)
CALCIUM SERPL-MCNC: 9.4 MG/DL (ref 8.5–10.1)
CHLORIDE SERPL-SCNC: 106 MMOL/L (ref 97–108)
CO2 SERPL-SCNC: 26 MMOL/L (ref 21–32)
CREAT SERPL-MCNC: 1.53 MG/DL (ref 0.7–1.3)
DIFFERENTIAL METHOD BLD: ABNORMAL
EOSINOPHIL # BLD: 0.2 K/UL (ref 0–0.4)
EOSINOPHIL NFR BLD: 4 % (ref 0–7)
ERYTHROCYTE [DISTWIDTH] IN BLOOD BY AUTOMATED COUNT: 18.6 % (ref 11.5–14.5)
GLOBULIN SER CALC-MCNC: 4.4 G/DL (ref 2–4)
GLUCOSE SERPL-MCNC: 119 MG/DL (ref 65–100)
HCT VFR BLD AUTO: 39.2 % (ref 36.6–50.3)
HGB BLD-MCNC: 12.5 G/DL (ref 12.1–17)
IMM GRANULOCYTES # BLD AUTO: 0 K/UL (ref 0–0.04)
IMM GRANULOCYTES NFR BLD AUTO: 1 % (ref 0–0.5)
LYMPHOCYTES # BLD: 0.8 K/UL (ref 0.8–3.5)
LYMPHOCYTES NFR BLD: 20 % (ref 12–49)
MCH RBC QN AUTO: 23.5 PG (ref 26–34)
MCHC RBC AUTO-ENTMCNC: 31.9 G/DL (ref 30–36.5)
MCV RBC AUTO: 73.8 FL (ref 80–99)
MONOCYTES # BLD: 0.6 K/UL (ref 0–1)
MONOCYTES NFR BLD: 15 % (ref 5–13)
NEUTS SEG # BLD: 2.2 K/UL (ref 1.8–8)
NEUTS SEG NFR BLD: 59 % (ref 32–75)
NRBC # BLD: 0 K/UL (ref 0–0.01)
NRBC BLD-RTO: 0 PER 100 WBC
PLATELET # BLD AUTO: 136 K/UL (ref 150–400)
PLATELET COMMENTS,PCOM: ABNORMAL
PMV BLD AUTO: ABNORMAL FL (ref 8.9–12.9)
POTASSIUM SERPL-SCNC: 4.4 MMOL/L (ref 3.5–5.1)
PROT SERPL-MCNC: 7.9 G/DL (ref 6.4–8.2)
RBC # BLD AUTO: 5.31 M/UL (ref 4.1–5.7)
RBC MORPH BLD: ABNORMAL
SODIUM SERPL-SCNC: 138 MMOL/L (ref 136–145)
TROPONIN-HIGH SENSITIVITY: 124 NG/L (ref 0–76)
WBC # BLD AUTO: 3.8 K/UL (ref 4.1–11.1)

## 2022-09-28 PROCEDURE — 99284 EMERGENCY DEPT VISIT MOD MDM: CPT

## 2022-09-28 PROCEDURE — 36415 COLL VENOUS BLD VENIPUNCTURE: CPT

## 2022-09-28 PROCEDURE — 84484 ASSAY OF TROPONIN QUANT: CPT

## 2022-09-28 PROCEDURE — 80053 COMPREHEN METABOLIC PANEL: CPT

## 2022-09-28 PROCEDURE — 85025 COMPLETE CBC W/AUTO DIFF WBC: CPT

## 2022-09-28 PROCEDURE — 82947 ASSAY GLUCOSE BLOOD QUANT: CPT

## 2022-09-28 PROCEDURE — 93005 ELECTROCARDIOGRAM TRACING: CPT

## 2022-09-28 RX ORDER — CALCIUM GLUCONATE 20 MG/ML
2 INJECTION, SOLUTION INTRAVENOUS ONCE
Status: DISCONTINUED | OUTPATIENT
Start: 2022-09-28 | End: 2022-09-29 | Stop reason: HOSPADM

## 2022-09-28 NOTE — ED NOTES
Call bell answered. Patient requesting to use the bedside commode. He was assisted to the bedside commode and the call bell was placed directly in front of him for when he finished.

## 2022-09-28 NOTE — DISCHARGE INSTRUCTIONS
Potassium was 4.4 today. Please follow-up with your primary care doctor for further lab work as indicated.

## 2022-09-28 NOTE — ED NOTES
Report received from [facility]. Reviewed reason for patient arrival, vitals, labs, medications, orders, procedures, results, pending orders/results and current plan for disposition. Questions were asked and answered prior to departure.

## 2022-09-28 NOTE — ED PROVIDER NOTES
EMERGENCY DEPARTMENT HISTORY AND PHYSICAL EXAM      Date: 9/28/2022  Patient Name: Home Philip    History of Presenting Illness     Chief Complaint   Patient presents with    Abnormal Lab Results     Pt had blood work and k came back high       History Provided By: Patient    HPI: Home Philip, 59 y.o. male presents to the ED with cc of referral from nursing facility. Per nursing documentation, patient was referred for low potassium and \"potassium drip\" but on the sheet it notes a potassium of 6.9. Patient is aphasic, unable to provide history. Per chart review he has history of ptosis, on lactulose, atorvastatin, he does take carvedilol Xarelto as well as Aldactone. Hx is limited due to patient's aphasia and no alternative caregivers at bedside. PCP: Jemima Duran MD    No current facility-administered medications on file prior to encounter. Current Outpatient Medications on File Prior to Encounter   Medication Sig Dispense Refill    lactulose (CHRONULAC) 10 gram/15 mL solution Take 45 mL by mouth daily. 1 Bottle 0    valsartan (DIOVAN) 40 mg tablet Take 1 Tablet by mouth daily. 30 Tablet 0    cholecalciferol (VITAMIN D3) (50,000 UNITS /1250 MCG) capsule Take  by mouth every Monday and Friday. Indications: low vitamin D levels      levETIRAcetam (KEPPRA) 750 mg tablet Take 750 mg by mouth daily. traMADoL (ULTRAM) 50 mg tablet Take 50 mg by mouth every six (6) hours as needed for Pain. atorvastatin (LIPITOR) 40 mg tablet Take 40 mg by mouth nightly. Avoid with grapefruit juice      FLUoxetine (PROzac) 40 mg capsule Take 40 mg by mouth nightly. divalproex DR (DEPAKOTE) 125 mg tablet Take 250 mg by mouth every twelve (12) hours. Give 2 capsules every 12 hours      acetaminophen (TYLENOL) 500 mg tablet Take  by mouth every eight (8) hours as needed for Pain. senna-docusate (Senna with Docusate Sodium) 8.6-50 mg per tablet Take 1 Tablet by mouth daily.       carvediloL (COREG) 6.25 mg tablet Take 6.25 mg by mouth two (2) times a day. Indications: high blood pressure      levothyroxine (SYNTHROID) 50 mcg tablet Take 50 mcg by mouth daily. On an empty stomach, at least 30 minutes before food. Do not take within 4 hours of iron or calcium, avoid medication containing antacids and MVI w min  Indications: a condition with low thyroid hormone levels      rivaroxaban (XARELTO) 20 mg tab tablet Take 20 mg by mouth daily. Past History     Past Medical History:  Past Medical History:   Diagnosis Date    Acid reflux     Alcohol abuse     Arthropathy, unspecified, site unspecified     Atrial fibrillation ([address]     Calcaneus fracture     Condyloma     Depression     Essential hypertension     H/O mitral valve repair     Along with MAZE procedure    High cholesterol     History of angina     Hypertension     IHSS (idiopathic hypertrophic subaortic stenosis) ([facility])     Ill-defined condition     pacemaker    Impotence of organic origin     LA thrombus 2015    LA and ARNULFO thrombus    Pacemaker     Thyroid disease     Trichilemmal cyst 4/10/2017    Unspecified cerebral artery occlusion with cerebral infarction        Past Surgical History:  Past Surgical History:   Procedure Laterality Date    HX APPENDECTOMY      HX CYST REMOVAL      HX HEART CATHETERIZATION      HX OTHER SURGICAL  2005    HX OTHER SURGICAL  2017    excision of scalp mass    HX PACEMAKER  2006    SC CARDIAC SURG PROCEDURE UNLIST  2006    Mitral Valve Replacement       Family History:  No family history on file. Social History:  Social History     Tobacco Use    Smoking status: Former     Types: Cigarettes     Quit date: 2020     Years since quittin.1    Smokeless tobacco: Never   Vaping Use    Vaping Use: Never used   Substance Use Topics    Alcohol use: Yes     Alcohol/week: 0.0 standard drinks     Types: 1 - 2 Cans of beer per week     Comment: occ    Drug use: Never       Allergies:   Allergies Allergen Reactions    Bactrim [Sulfamethoxazole-Trimethoprim] Other (comments)     GI Distress         Review of Systems   Review of Systems   Unable to perform ROS: Patient nonverbal     Physical Exam   Physical Exam  Vitals and nursing note reviewed. Constitutional:       Appearance: Normal appearance. HENT:      Head: Normocephalic and atraumatic. Eyes:      Conjunctiva/sclera: Conjunctivae normal.   Cardiovascular:      Rate and Rhythm: Normal rate and regular rhythm. Pulses: Normal pulses. Pulmonary:      Effort: Pulmonary effort is normal.      Breath sounds: Normal breath sounds. Abdominal:      General: Abdomen is flat. Palpations: Abdomen is soft. Musculoskeletal:      Cervical back: Neck supple. Skin:     General: Skin is warm. Comments: PICC line RUE, c/d/i   Neurological:      General: No focal deficit present. Mental Status: He is alert.    Psychiatric:         Mood and Affect: Mood normal.         Behavior: Behavior normal.       Diagnostic Study Results     Labs -     Recent Results (from the past 12 hour(s))   EKG, 12 LEAD, INITIAL    Collection Time: 09/28/22  6:14 PM   Result Value Ref Range    Ventricular Rate 70 BPM    Atrial Rate 227 BPM    QRS Duration 184 ms    Q-T Interval 530 ms    QTC Calculation (Bezet) 572 ms    Calculated R Axis -61 degrees    Calculated T Axis 106 degrees    Diagnosis       Ventricular-paced rhythm  When compared with ECG of 02-SEP-2022 23:36,  No significant change was found     CBC WITH AUTOMATED DIFF    Collection Time: 09/28/22  6:46 PM   Result Value Ref Range    WBC 3.8 (L) 4.1 - 11.1 K/uL    RBC 5.31 4.10 - 5.70 M/uL    HGB 12.5 12.1 - 17.0 g/dL    HCT 39.2 36.6 - 50.3 %    MCV 73.8 (L) 80.0 - 99.0 FL    MCH 23.5 (L) 26.0 - 34.0 PG    MCHC 31.9 30.0 - 36.5 g/dL    RDW 18.6 (H) 11.5 - 14.5 %    PLATELET 664 (L) 762 - 400 K/uL    MPV ABNORMAL 8.9 - 12.9 FL    NRBC 0.0 0  WBC    ABSOLUTE NRBC 0.00 0.00 - 0.01 K/uL NEUTROPHILS 59 32 - 75 %    LYMPHOCYTES 20 12 - 49 %    MONOCYTES 15 (H) 5 - 13 %    EOSINOPHILS 4 0 - 7 %    BASOPHILS 1 0 - 1 %    IMMATURE GRANULOCYTES 1 (H) 0.0 - 0.5 %    ABS. NEUTROPHILS 2.2 1.8 - 8.0 K/UL    ABS. LYMPHOCYTES 0.8 0.8 - 3.5 K/UL    ABS. MONOCYTES 0.6 0.0 - 1.0 K/UL    ABS. EOSINOPHILS 0.2 0.0 - 0.4 K/UL    ABS. BASOPHILS 0.0 0.0 - 0.1 K/UL    ABS. IMM. GRANS. 0.0 0.00 - 0.04 K/UL    DF SMEAR SCANNED      PLATELET COMMENTS Large Platelets      RBC COMMENTS ANISOCYTOSIS  1+        RBC COMMENTS MALDONADO CELLS  PRESENT        RBC COMMENTS RBC FRAGMENTS     METABOLIC PANEL, COMPREHENSIVE    Collection Time: 09/28/22  6:46 PM   Result Value Ref Range    Sodium 138 136 - 145 mmol/L    Potassium 4.4 3.5 - 5.1 mmol/L    Chloride 106 97 - 108 mmol/L    CO2 26 21 - 32 mmol/L    Anion gap 6 5 - 15 mmol/L    Glucose 119 (H) 65 - 100 mg/dL    BUN 17 6 - 20 MG/DL    Creatinine 1.53 (H) 0.70 - 1.30 MG/DL    BUN/Creatinine ratio 11 (L) 12 - 20      GFR est AA 57 (L) >60 ml/min/1.73m2    GFR est non-AA 47 (L) >60 ml/min/1.73m2    Calcium 9.4 8.5 - 10.1 MG/DL    Bilirubin, total 2.8 (H) 0.2 - 1.0 MG/DL    ALT (SGPT) 47 12 - 78 U/L    AST (SGOT) 34 15 - 37 U/L    Alk. phosphatase 162 (H) 45 - 117 U/L    Protein, total 7.9 6.4 - 8.2 g/dL    Albumin 3.5 3.5 - 5.0 g/dL    Globulin 4.4 (H) 2.0 - 4.0 g/dL    A-G Ratio 0.8 (L) 1.1 - 2.2     TROPONIN-HIGH SENSITIVITY    Collection Time: 09/28/22  6:46 PM   Result Value Ref Range    Troponin-High Sensitivity 124 (HH) 0 - 76 ng/L       Radiologic Studies -   No orders to display     CT Results  (Last 48 hours)      None          CXR Results  (Last 48 hours)      None            Medical Decision Making   I am the first provider for this patient. I reviewed the vital signs, available nursing notes, past medical history, past surgical history, family history and social history. Vital Signs-Reviewed the patient's vital signs.   Patient Vitals for the past 12 hrs:   Temp Pulse Resp BP SpO2   09/28/22 1852 -- -- -- -- 100 %   09/28/22 1850 97.9 °F (36.6 °C) 72 16 135/79 --       EKG interpretation: (Preliminary)  V paced, rate 70     Records Reviewed: Nursing Notes, Previous Radiology Studies, and Previous Laboratory Studies    Provider Notes (Medical Decision Making):   60 yo male presenting with c/c of elevated K? Unclear if elevated or low based on paperwork from nursing staff. Patient asymptomatic, his EKG is vpaced and unchanged, given empiric calcium due to reported K of 6.9. will obtain repeat labs, disposition pending work up. ED Course:   Initial assessment performed. The patients presenting problems have been discussed, and they are in agreement with the care plan formulated and outlined with them. I have encouraged them to ask questions as they arise throughout their visit. ED Course as of 09/29/22 0010   Wed Sep 28, 2022   1934 M4.4K 4.4, troponin elevated but has been chronically elevated, patient with no chest pain or shortness of breath, will discharge back to facility. [NM]      ED Course User Index  [NM] Lizz Bales DO       Disposition:    DC- Adult Discharges: All of the diagnostic tests were reviewed and questions answered. Diagnosis, care plan and treatment options were discussed. The patient understands the instructions and will follow up as directed. The patients results have been reviewed with them. They have been counseled regarding their diagnosis. The patient verbally convey understanding and agreement of the signs, symptoms, diagnosis, treatment and prognosis and additionally agrees to follow up as recommended with their PCP in 24 - 48 hours. They also agree with the care-plan and convey that all of their questions have been answered.   I have also put together some discharge instructions for them that include: 1) educational information regarding their diagnosis, 2) how to care for their diagnosis at home, as well a 3) list of reasons why they would want to return to the ED prior to their follow-up appointment, should their condition change. DC-The patient was given verbal follow-up instructions      DISCHARGE PLAN:  1. Discharge Medication List as of 9/28/2022  7:36 PM        2. Follow-up Information       Follow up With Specialties Details Why Contact Info    Nancy Palencia MD Family Medicine Schedule an appointment as soon as possible for a visit in 1 week  [address] [facility] [place]  [address] [phone]            3. Return to ED if worse     Diagnosis     Clinical Impression:   1. Encounter for laboratory test        Attestations:    Anibal Gray, DO        Please note that this dictation was completed with iSSimple, the computer voice recognition software. Quite often unanticipated grammatical, syntax, homophones, and other interpretive errors are inadvertently transcribed by the computer software. Please disregard these errors. Please excuse any errors that have escaped final proofreading. Thank you.

## 2022-09-29 LAB
ATRIAL RATE: 227 BPM
BASE EXCESS BLD CALC-SCNC: 3.7 MMOL/L
CA-I BLD-MCNC: 1.31 MMOL/L (ref 1.12–1.32)
CALCULATED R AXIS, ECG10: -61 DEGREES
CALCULATED T AXIS, ECG11: 106 DEGREES
CHLORIDE BLD-SCNC: 106 MMOL/L (ref 100–108)
CO2 BLD-SCNC: 29 MMOL/L (ref 19–24)
CREAT UR-MCNC: 1.4 MG/DL (ref 0.6–1.3)
DIAGNOSIS, 93000: NORMAL
HCO3 BLDA-SCNC: 29 MMOL/L
LACTATE BLD-SCNC: 0.78 MMOL/L (ref 0.4–2)
PCO2 BLDV: 42.8 MMHG (ref 41–51)
PH BLDV: 7.43 [PH] (ref 7.32–7.42)
PO2 BLDV: 34 MMHG (ref 25–40)
POTASSIUM BLD-SCNC: 4.2 MMOL/L (ref 3.5–5.5)
Q-T INTERVAL, ECG07: 530 MS
QRS DURATION, ECG06: 184 MS
QTC CALCULATION (BEZET), ECG08: 572 MS
SODIUM BLD-SCNC: 143 MMOL/L (ref 136–145)
SPECIMEN SITE: ABNORMAL
VENTRICULAR RATE, ECG03: 70 BPM

## 2022-09-29 NOTE — ED NOTES
Patient discharged from the ED by Dr. Nancy Decker. Diagnosis, medications, precautions and follow-ups were reviewed with the patient/family. Questions were asked and answered prior to departure. Patient departed the ED via wheelchair and was accompanied by [facility].

## 2022-09-29 NOTE — ED NOTES
Provider stated that the patient is going to be discharged back to [facility]. She advised that we no longer need to administer the calcium gluconate. [facility] will be here around 2030 to get the patient.  Patient unhooked from everything, changed and dressed prior to [facility] arrival. PICC left in place as it was present on arrival.

## 2022-12-21 ENCOUNTER — HOSPITAL ENCOUNTER (INPATIENT)
Age: 57
LOS: 7 days | Discharge: NURSING, REHAB OR LONG-TERM CARE | DRG: 100 | End: 2022-12-28
Attending: STUDENT IN AN ORGANIZED HEALTH CARE EDUCATION/TRAINING PROGRAM | Admitting: INTERNAL MEDICINE
Payer: MEDICARE

## 2022-12-21 ENCOUNTER — APPOINTMENT (OUTPATIENT)
Dept: GENERAL RADIOLOGY | Age: 57
DRG: 100 | End: 2022-12-21
Attending: EMERGENCY MEDICINE
Payer: MEDICARE

## 2022-12-21 ENCOUNTER — APPOINTMENT (OUTPATIENT)
Dept: CT IMAGING | Age: 57
DRG: 100 | End: 2022-12-21
Attending: EMERGENCY MEDICINE
Payer: MEDICARE

## 2022-12-21 DIAGNOSIS — Z71.89 GOALS OF CARE, COUNSELING/DISCUSSION: ICD-10-CM

## 2022-12-21 DIAGNOSIS — R41.89 IMPAIRED COGNITIVE ABILITY: ICD-10-CM

## 2022-12-21 DIAGNOSIS — G40.901 STATUS EPILEPTICUS (HCC): Primary | ICD-10-CM

## 2022-12-21 DIAGNOSIS — Z71.89 DNR (DO NOT RESUSCITATE) DISCUSSION: ICD-10-CM

## 2022-12-21 DIAGNOSIS — Z51.5 PALLIATIVE CARE BY SPECIALIST: ICD-10-CM

## 2022-12-21 DIAGNOSIS — R47.01 EXPRESSIVE APHASIA: ICD-10-CM

## 2022-12-21 DIAGNOSIS — J96.02 ACUTE RESPIRATORY FAILURE WITH HYPOXIA AND HYPERCAPNIA (HCC): ICD-10-CM

## 2022-12-21 DIAGNOSIS — R53.1 WEAKNESS GENERALIZED: ICD-10-CM

## 2022-12-21 DIAGNOSIS — R45.1 RESTLESSNESS: ICD-10-CM

## 2022-12-21 DIAGNOSIS — J96.01 ACUTE RESPIRATORY FAILURE WITH HYPOXIA AND HYPERCAPNIA (HCC): ICD-10-CM

## 2022-12-21 DIAGNOSIS — R41.89 DECREASED RESPONSIVENESS: ICD-10-CM

## 2022-12-21 LAB
ALBUMIN SERPL-MCNC: 4 G/DL (ref 3.5–5)
ALBUMIN/GLOB SERPL: 0.8 {RATIO} (ref 1.1–2.2)
ALP SERPL-CCNC: 162 U/L (ref 45–117)
ALT SERPL-CCNC: 30 U/L (ref 12–78)
AMMONIA PLAS-SCNC: 165 UMOL/L
AMPHET UR QL SCN: NEGATIVE
ANION GAP SERPL CALC-SCNC: 8 MMOL/L (ref 5–15)
APPEARANCE UR: CLEAR
ARTERIAL PATENCY WRIST A: YES
AST SERPL-CCNC: 44 U/L (ref 15–37)
BACTERIA URNS QL MICRO: ABNORMAL /HPF
BARBITURATES UR QL SCN: NEGATIVE
BASE DEFICIT BLD-SCNC: 9.3 MMOL/L
BASE DEFICIT BLDA-SCNC: 3 MMOL/L
BASOPHILS # BLD: 0.1 K/UL (ref 0–0.1)
BASOPHILS NFR BLD: 1 % (ref 0–1)
BDY SITE: ABNORMAL
BENZODIAZ UR QL: POSITIVE
BILIRUB SERPL-MCNC: 1.8 MG/DL (ref 0.2–1)
BILIRUB UR QL: NEGATIVE
BUN SERPL-MCNC: 17 MG/DL (ref 6–20)
BUN/CREAT SERPL: 12 (ref 12–20)
CA-I BLD-MCNC: 1.43 MMOL/L (ref 1.12–1.32)
CALCIUM SERPL-MCNC: 10.3 MG/DL (ref 8.5–10.1)
CANNABINOIDS UR QL SCN: NEGATIVE
CAOX CRY URNS QL MICRO: ABNORMAL
CHLORIDE BLD-SCNC: 107 MMOL/L (ref 100–108)
CHLORIDE SERPL-SCNC: 103 MMOL/L (ref 97–108)
CO2 BLD-SCNC: 27 MMOL/L (ref 19–24)
CO2 SERPL-SCNC: 29 MMOL/L (ref 21–32)
COCAINE UR QL SCN: NEGATIVE
COLOR UR: ABNORMAL
COVID-19 RAPID TEST, COVR: NOT DETECTED
CREAT SERPL-MCNC: 1.45 MG/DL (ref 0.7–1.3)
CREAT UR-MCNC: 1.2 MG/DL (ref 0.6–1.3)
DIFFERENTIAL METHOD BLD: ABNORMAL
DRUG SCRN COMMENT,DRGCM: ABNORMAL
EOSINOPHIL # BLD: 0.2 K/UL (ref 0–0.4)
EOSINOPHIL NFR BLD: 1 % (ref 0–7)
EPITH CASTS URNS QL MICRO: ABNORMAL /LPF
ERYTHROCYTE [DISTWIDTH] IN BLOOD BY AUTOMATED COUNT: 15.6 % (ref 11.5–14.5)
ETHANOL SERPL-MCNC: <10 MG/DL
GLOBULIN SER CALC-MCNC: 5 G/DL (ref 2–4)
GLUCOSE BLD STRIP.AUTO-MCNC: 179 MG/DL (ref 74–106)
GLUCOSE BLD STRIP.AUTO-MCNC: 83 MG/DL (ref 65–117)
GLUCOSE BLD STRIP.AUTO-MCNC: 86 MG/DL (ref 65–117)
GLUCOSE SERPL-MCNC: 178 MG/DL (ref 65–100)
GLUCOSE UR STRIP.AUTO-MCNC: NEGATIVE MG/DL
HCO3 BLDA-SCNC: 19 MMOL/L (ref 22–26)
HCO3 BLDA-SCNC: 26 MMOL/L
HCT VFR BLD AUTO: 47.9 % (ref 36.6–50.3)
HGB BLD-MCNC: 14.4 G/DL (ref 12.1–17)
HGB UR QL STRIP: ABNORMAL
IMM GRANULOCYTES # BLD AUTO: 0.2 K/UL (ref 0–0.04)
IMM GRANULOCYTES NFR BLD AUTO: 1 % (ref 0–0.5)
KETONES UR QL STRIP.AUTO: ABNORMAL MG/DL
LACTATE BLD-SCNC: 4.23 MMOL/L (ref 0.4–2)
LEUKOCYTE ESTERASE UR QL STRIP.AUTO: ABNORMAL
LYMPHOCYTES # BLD: 1.5 K/UL (ref 0.8–3.5)
LYMPHOCYTES NFR BLD: 12 % (ref 12–49)
MAGNESIUM SERPL-MCNC: 2.3 MG/DL (ref 1.6–2.4)
MCH RBC QN AUTO: 22.9 PG (ref 26–34)
MCHC RBC AUTO-ENTMCNC: 30.1 G/DL (ref 30–36.5)
MCV RBC AUTO: 76.3 FL (ref 80–99)
METHADONE UR QL: NEGATIVE
MONOCYTES # BLD: 0.6 K/UL (ref 0–1)
MONOCYTES NFR BLD: 5 % (ref 5–13)
MUCOUS THREADS URNS QL MICRO: ABNORMAL /LPF
NEUTS SEG # BLD: 10.3 K/UL (ref 1.8–8)
NEUTS SEG NFR BLD: 80 % (ref 32–75)
NITRITE UR QL STRIP.AUTO: POSITIVE
NRBC # BLD: 0 K/UL (ref 0–0.01)
NRBC BLD-RTO: 0 PER 100 WBC
OPIATES UR QL: NEGATIVE
PCO2 BLDA: 27 MMHG (ref 35–45)
PCO2 BLDV: 105.5 MMHG (ref 41–51)
PCP UR QL: NEGATIVE
PH BLDA: 7.46 [PH] (ref 7.35–7.45)
PH BLDV: 7 [PH] (ref 7.32–7.42)
PH UR STRIP: 6 [PH] (ref 5–8)
PHOSPHATE SERPL-MCNC: 7 MG/DL (ref 2.6–4.7)
PLATELET # BLD AUTO: 155 K/UL (ref 150–400)
PMV BLD AUTO: ABNORMAL FL (ref 8.9–12.9)
PO2 BLDA: 71 MMHG (ref 80–100)
PO2 BLDV: 88 MMHG (ref 25–40)
POTASSIUM BLD-SCNC: 3.6 MMOL/L (ref 3.5–5.5)
POTASSIUM SERPL-SCNC: 4.1 MMOL/L (ref 3.5–5.1)
PROT SERPL-MCNC: 9 G/DL (ref 6.4–8.2)
PROT UR STRIP-MCNC: 30 MG/DL
RBC # BLD AUTO: 6.28 M/UL (ref 4.1–5.7)
RBC #/AREA URNS HPF: ABNORMAL /HPF (ref 0–5)
SAO2 % BLD: 95 % (ref 92–97)
SAO2% DEVICE SAO2% SENSOR NAME: ABNORMAL
SERVICE CMNT-IMP: ABNORMAL
SERVICE CMNT-IMP: NORMAL
SERVICE CMNT-IMP: NORMAL
SODIUM BLD-SCNC: 142 MMOL/L (ref 136–145)
SODIUM SERPL-SCNC: 140 MMOL/L (ref 136–145)
SOURCE, COVRS: NORMAL
SP GR UR REFRACTOMETRY: 1.02
SPECIMEN SITE: ABNORMAL
SPECIMEN SITE: ABNORMAL
TSH SERPL DL<=0.05 MIU/L-ACNC: 6.9 UIU/ML (ref 0.36–3.74)
UA: UC IF INDICATED,UAUC: ABNORMAL
UROBILINOGEN UR QL STRIP.AUTO: 4 EU/DL (ref 0.2–1)
VALPROATE SERPL-MCNC: <3 UG/ML (ref 50–100)
WBC # BLD AUTO: 12.9 K/UL (ref 4.1–11.1)
WBC URNS QL MICRO: ABNORMAL /HPF (ref 0–4)

## 2022-12-21 PROCEDURE — 82077 ASSAY SPEC XCP UR&BREATH IA: CPT

## 2022-12-21 PROCEDURE — 94640 AIRWAY INHALATION TREATMENT: CPT

## 2022-12-21 PROCEDURE — 74011000250 HC RX REV CODE- 250: Performed by: EMERGENCY MEDICINE

## 2022-12-21 PROCEDURE — 84443 ASSAY THYROID STIM HORMONE: CPT

## 2022-12-21 PROCEDURE — 82962 GLUCOSE BLOOD TEST: CPT

## 2022-12-21 PROCEDURE — 51702 INSERT TEMP BLADDER CATH: CPT

## 2022-12-21 PROCEDURE — 87086 URINE CULTURE/COLONY COUNT: CPT

## 2022-12-21 PROCEDURE — 31500 INSERT EMERGENCY AIRWAY: CPT

## 2022-12-21 PROCEDURE — 82947 ASSAY GLUCOSE BLOOD QUANT: CPT

## 2022-12-21 PROCEDURE — 83735 ASSAY OF MAGNESIUM: CPT

## 2022-12-21 PROCEDURE — 83036 HEMOGLOBIN GLYCOSYLATED A1C: CPT

## 2022-12-21 PROCEDURE — 84100 ASSAY OF PHOSPHORUS: CPT

## 2022-12-21 PROCEDURE — 65610000006 HC RM INTENSIVE CARE

## 2022-12-21 PROCEDURE — 87077 CULTURE AEROBIC IDENTIFY: CPT

## 2022-12-21 PROCEDURE — 81001 URINALYSIS AUTO W/SCOPE: CPT

## 2022-12-21 PROCEDURE — 36600 WITHDRAWAL OF ARTERIAL BLOOD: CPT

## 2022-12-21 PROCEDURE — 5A1945Z RESPIRATORY VENTILATION, 24-96 CONSECUTIVE HOURS: ICD-10-PCS | Performed by: INTERNAL MEDICINE

## 2022-12-21 PROCEDURE — 80164 ASSAY DIPROPYLACETIC ACD TOT: CPT

## 2022-12-21 PROCEDURE — 82140 ASSAY OF AMMONIA: CPT

## 2022-12-21 PROCEDURE — 96375 TX/PRO/DX INJ NEW DRUG ADDON: CPT

## 2022-12-21 PROCEDURE — 87186 SC STD MICRODIL/AGAR DIL: CPT

## 2022-12-21 PROCEDURE — 70450 CT HEAD/BRAIN W/O DYE: CPT

## 2022-12-21 PROCEDURE — 71045 X-RAY EXAM CHEST 1 VIEW: CPT

## 2022-12-21 PROCEDURE — 74011000258 HC RX REV CODE- 258: Performed by: EMERGENCY MEDICINE

## 2022-12-21 PROCEDURE — 0BH17EZ INSERTION OF ENDOTRACHEAL AIRWAY INTO TRACHEA, VIA NATURAL OR ARTIFICIAL OPENING: ICD-10-PCS | Performed by: INTERNAL MEDICINE

## 2022-12-21 PROCEDURE — 87635 SARS-COV-2 COVID-19 AMP PRB: CPT

## 2022-12-21 PROCEDURE — 82803 BLOOD GASES ANY COMBINATION: CPT

## 2022-12-21 PROCEDURE — 87070 CULTURE OTHR SPECIMN AEROBIC: CPT

## 2022-12-21 PROCEDURE — 80307 DRUG TEST PRSMV CHEM ANLYZR: CPT

## 2022-12-21 PROCEDURE — 87040 BLOOD CULTURE FOR BACTERIA: CPT

## 2022-12-21 PROCEDURE — 94002 VENT MGMT INPAT INIT DAY: CPT

## 2022-12-21 PROCEDURE — 74011250637 HC RX REV CODE- 250/637: Performed by: INTERNAL MEDICINE

## 2022-12-21 PROCEDURE — 74011250636 HC RX REV CODE- 250/636: Performed by: EMERGENCY MEDICINE

## 2022-12-21 PROCEDURE — 93005 ELECTROCARDIOGRAM TRACING: CPT

## 2022-12-21 PROCEDURE — 96374 THER/PROPH/DIAG INJ IV PUSH: CPT

## 2022-12-21 PROCEDURE — 80053 COMPREHEN METABOLIC PANEL: CPT

## 2022-12-21 PROCEDURE — 85025 COMPLETE CBC W/AUTO DIFF WBC: CPT

## 2022-12-21 PROCEDURE — 36415 COLL VENOUS BLD VENIPUNCTURE: CPT

## 2022-12-21 PROCEDURE — 95816 EEG AWAKE AND DROWSY: CPT | Performed by: INTERNAL MEDICINE

## 2022-12-21 PROCEDURE — 83605 ASSAY OF LACTIC ACID: CPT

## 2022-12-21 PROCEDURE — 99285 EMERGENCY DEPT VISIT HI MDM: CPT

## 2022-12-21 PROCEDURE — 74011250636 HC RX REV CODE- 250/636: Performed by: INTERNAL MEDICINE

## 2022-12-21 PROCEDURE — 74011000250 HC RX REV CODE- 250: Performed by: INTERNAL MEDICINE

## 2022-12-21 RX ORDER — SODIUM CHLORIDE 0.9 % (FLUSH) 0.9 %
5-40 SYRINGE (ML) INJECTION EVERY 8 HOURS
Status: DISCONTINUED | OUTPATIENT
Start: 2022-12-21 | End: 2022-12-28 | Stop reason: HOSPADM

## 2022-12-21 RX ORDER — ONDANSETRON 2 MG/ML
4 INJECTION INTRAMUSCULAR; INTRAVENOUS
Status: DISCONTINUED | OUTPATIENT
Start: 2022-12-21 | End: 2022-12-22

## 2022-12-21 RX ORDER — METOPROLOL TARTRATE 25 MG/1
12.5 TABLET, FILM COATED ORAL EVERY 8 HOURS
Status: DISCONTINUED | OUTPATIENT
Start: 2022-12-21 | End: 2022-12-22

## 2022-12-21 RX ORDER — PROPOFOL 10 MG/ML
0-50 VIAL (ML) INTRAVENOUS
Status: DISCONTINUED | OUTPATIENT
Start: 2022-12-21 | End: 2022-12-23

## 2022-12-21 RX ORDER — ENOXAPARIN SODIUM 100 MG/ML
40 INJECTION SUBCUTANEOUS DAILY
Status: DISCONTINUED | OUTPATIENT
Start: 2022-12-22 | End: 2022-12-23

## 2022-12-21 RX ORDER — ACETAMINOPHEN 325 MG/1
650 TABLET ORAL
Status: DISCONTINUED | OUTPATIENT
Start: 2022-12-21 | End: 2022-12-22

## 2022-12-21 RX ORDER — VANCOMYCIN/0.9 % SOD CHLORIDE 1.5G/250ML
1500 PLASTIC BAG, INJECTION (ML) INTRAVENOUS
Status: ACTIVE | OUTPATIENT
Start: 2022-12-21 | End: 2022-12-22

## 2022-12-21 RX ORDER — DIVALPROEX SODIUM 125 MG/1
125 CAPSULE, COATED PELLETS ORAL EVERY 12 HOURS
Status: DISCONTINUED | OUTPATIENT
Start: 2022-12-21 | End: 2022-12-22

## 2022-12-21 RX ORDER — INSULIN LISPRO 100 [IU]/ML
INJECTION, SOLUTION INTRAVENOUS; SUBCUTANEOUS EVERY 6 HOURS
Status: DISCONTINUED | OUTPATIENT
Start: 2022-12-21 | End: 2022-12-28 | Stop reason: HOSPADM

## 2022-12-21 RX ORDER — ETOMIDATE 2 MG/ML
20 INJECTION INTRAVENOUS ONCE
Status: COMPLETED | OUTPATIENT
Start: 2022-12-21 | End: 2022-12-21

## 2022-12-21 RX ORDER — SODIUM CHLORIDE 0.9 % (FLUSH) 0.9 %
5-40 SYRINGE (ML) INJECTION AS NEEDED
Status: DISCONTINUED | OUTPATIENT
Start: 2022-12-21 | End: 2022-12-28 | Stop reason: HOSPADM

## 2022-12-21 RX ORDER — ROCURONIUM BROMIDE 50 MG/5 ML
100 SYRINGE (ML) INTRAVENOUS
Status: COMPLETED | OUTPATIENT
Start: 2022-12-21 | End: 2022-12-21

## 2022-12-21 RX ORDER — LEVOTHYROXINE SODIUM 50 UG/1
50 TABLET ORAL
Status: DISCONTINUED | OUTPATIENT
Start: 2022-12-22 | End: 2022-12-22

## 2022-12-21 RX ORDER — LEVETIRACETAM 500 MG/5ML
1000 INJECTION, SOLUTION, CONCENTRATE INTRAVENOUS EVERY 12 HOURS
Status: DISCONTINUED | OUTPATIENT
Start: 2022-12-22 | End: 2022-12-28 | Stop reason: HOSPADM

## 2022-12-21 RX ORDER — MAGNESIUM SULFATE 100 %
4 CRYSTALS MISCELLANEOUS AS NEEDED
Status: DISCONTINUED | OUTPATIENT
Start: 2022-12-21 | End: 2022-12-28 | Stop reason: HOSPADM

## 2022-12-21 RX ORDER — ALBUTEROL SULFATE 0.83 MG/ML
2.5 SOLUTION RESPIRATORY (INHALATION)
Status: DISCONTINUED | OUTPATIENT
Start: 2022-12-21 | End: 2022-12-24

## 2022-12-21 RX ORDER — POLYETHYLENE GLYCOL 3350 17 G/17G
17 POWDER, FOR SOLUTION ORAL DAILY PRN
Status: DISCONTINUED | OUTPATIENT
Start: 2022-12-21 | End: 2022-12-22

## 2022-12-21 RX ORDER — ONDANSETRON 4 MG/1
4 TABLET, ORALLY DISINTEGRATING ORAL
Status: DISCONTINUED | OUTPATIENT
Start: 2022-12-21 | End: 2022-12-22

## 2022-12-21 RX ADMIN — AMPICILLIN SODIUM AND SULBACTAM SODIUM 3 G: 2; 1 INJECTION, POWDER, FOR SOLUTION INTRAMUSCULAR; INTRAVENOUS at 16:42

## 2022-12-21 RX ADMIN — PROPOFOL 45 MCG/KG/MIN: 10 INJECTION, EMULSION INTRAVENOUS at 18:31

## 2022-12-21 RX ADMIN — Medication 100 MG: at 13:35

## 2022-12-21 RX ADMIN — SODIUM CHLORIDE, PRESERVATIVE FREE 10 ML: 5 INJECTION INTRAVENOUS at 23:10

## 2022-12-21 RX ADMIN — ALBUTEROL SULFATE 2.5 MG: 2.5 SOLUTION RESPIRATORY (INHALATION) at 19:42

## 2022-12-21 RX ADMIN — SODIUM CHLORIDE 1000 ML: 9 INJECTION, SOLUTION INTRAVENOUS at 14:18

## 2022-12-21 RX ADMIN — DIVALPROEX SODIUM 125 MG: 125 CAPSULE, COATED PELLETS ORAL at 19:03

## 2022-12-21 RX ADMIN — SODIUM CHLORIDE 4500 MG: 9 INJECTION, SOLUTION INTRAVENOUS at 14:17

## 2022-12-21 RX ADMIN — METOPROLOL TARTRATE 12.5 MG: 25 TABLET, FILM COATED ORAL at 23:06

## 2022-12-21 RX ADMIN — SODIUM CHLORIDE, PRESERVATIVE FREE 20 MG: 5 INJECTION INTRAVENOUS at 23:09

## 2022-12-21 RX ADMIN — ETOMIDATE 20 MG: 2 INJECTION, SOLUTION INTRAVENOUS at 13:34

## 2022-12-21 RX ADMIN — SODIUM CHLORIDE, PRESERVATIVE FREE 10 ML: 5 INJECTION INTRAVENOUS at 19:04

## 2022-12-21 RX ADMIN — PROPOFOL 25 MCG/KG/MIN: 10 INJECTION, EMULSION INTRAVENOUS at 14:14

## 2022-12-21 RX ADMIN — LACTULOSE 30 ML: 20 SOLUTION ORAL at 23:32

## 2022-12-21 RX ADMIN — PROPOFOL 45 MCG/KG/MIN: 10 INJECTION, EMULSION INTRAVENOUS at 23:31

## 2022-12-21 RX ADMIN — LACTULOSE 30 ML: 20 SOLUTION ORAL at 19:03

## 2022-12-21 NOTE — ED NOTES
Patient arrives via EMS from [facility] for seizures being bagged due to no respiratory effort and a GCS of 3. EMS reports seizure lasting more than 10 minutes. EMS gave 5mg versed via IM, which broke the seizure.

## 2022-12-21 NOTE — ED PROVIDER NOTES
EMERGENCY DEPARTMENT HISTORY AND PHYSICAL EXAM      Date: 12/21/2022  Patient Name: Kenna Lin  Patient Age and Sex: 59 y.o. male     History of Presenting Illness     Chief Complaint   Patient presents with    Respiratory Distress    Seizure     History Provided By: EMS    HPI: Kenna Lin is a 58-year-old history of seizure disorder on Depakote, A. fib on Xarelto, CVA presenting with seizures. Patient had multiple seizures prior to EMS arrival  with associated vomiting. Approximately 20 minutes of seizure activity, approximately 3 seizures with seconds of post-ictal periods. Additional seizure was witnessed per EMS in route, given 5 of IM Versed. Following this patient without respiratory effort receiving bag-valve-mask ventilation in route. SpO2 >95% en route with ventilations. History limited due to AMS. There are no other complaints, changes, or physical findings at this time. PCP: Santiago Knight MD    No current facility-administered medications on file prior to encounter. Current Outpatient Medications on File Prior to Encounter   Medication Sig Dispense Refill    lactulose (CHRONULAC) 10 gram/15 mL solution Take 45 mL by mouth daily. 1 Bottle 0    valsartan (DIOVAN) 40 mg tablet Take 1 Tablet by mouth daily. 30 Tablet 0    cholecalciferol (VITAMIN D3) (50,000 UNITS /1250 MCG) capsule Take  by mouth every Monday and Friday. Indications: low vitamin D levels      levETIRAcetam (KEPPRA) 750 mg tablet Take 750 mg by mouth daily. traMADoL (ULTRAM) 50 mg tablet Take 50 mg by mouth every six (6) hours as needed for Pain. atorvastatin (LIPITOR) 40 mg tablet Take 40 mg by mouth nightly. Avoid with grapefruit juice      FLUoxetine (PROzac) 40 mg capsule Take 40 mg by mouth nightly. divalproex DR (DEPAKOTE) 125 mg tablet Take 250 mg by mouth every twelve (12) hours.  Give 2 capsules every 12 hours      acetaminophen (TYLENOL) 500 mg tablet Take  by mouth every eight (8) hours as needed for Pain.      senna-docusate (Senna with Docusate Sodium) 8.6-50 mg per tablet Take 1 Tablet by mouth daily. carvediloL (COREG) 6.25 mg tablet Take 6.25 mg by mouth two (2) times a day. Indications: high blood pressure      levothyroxine (SYNTHROID) 50 mcg tablet Take 50 mcg by mouth daily. On an empty stomach, at least 30 minutes before food. Do not take within 4 hours of iron or calcium, avoid medication containing antacids and MVI w min  Indications: a condition with low thyroid hormone levels      [DISCONTINUED] rivaroxaban (XARELTO) 20 mg tab tablet Take 20 mg by mouth daily. Past History   Past Medical History:  Past Medical History:   Diagnosis Date    Acid reflux     Alcohol abuse     Arthropathy, unspecified, site unspecified     Atrial fibrillation ([facility] [address]     Calcaneus fracture     Condyloma     Depression     Essential hypertension     H/O mitral valve repair     Along with MAZE procedure    High cholesterol     History of angina     Hypertension     IHSS (idiopathic hypertrophic subaortic stenosis) (HCC)     Ill-defined condition     pacemaker    Impotence of organic origin     LA thrombus 2015    LA and ARNULFO thrombus    Pacemaker     Thyroid disease     Trichilemmal cyst 4/10/2017    Unspecified cerebral artery occlusion with cerebral infarction      Past Surgical History:  Past Surgical History:   Procedure Laterality Date    HX APPENDECTOMY      HX CYST REMOVAL      HX HEART CATHETERIZATION      HX OTHER SURGICAL  2005    HX OTHER SURGICAL  2017    excision of scalp mass    HX PACEMAKER  2006    [place] CARDIAC SURG PROCEDURE UNLIST  2006    Mitral Valve Replacement       Family History:  No family history on file.     Social History:  Social History     Tobacco Use    Smoking status: Former     Types: Cigarettes     Quit date: 2020     Years since quittin.3    Smokeless tobacco: Never   Vaping Use    Vaping Use: Never used   Substance Use Topics    Alcohol use: Yes     Alcohol/week: 0.0 standard drinks     Types: 1 - 2 Cans of beer per week     Comment: occ    Drug use: Never       Allergies: Allergies   Allergen Reactions    Bactrim [Sulfamethoxazole-Trimethoprim] Other (comments)     GI Distress       Review of Systems   Review of Systems   Unable to perform ROS: Mental status change        Physical Exam   Physical Exam  Vitals and nursing note reviewed. Constitutional:       Comments: Eyes closed, not responsive to verbal or painful stimulus, localizing pain in all extremities   HENT:      Head: Normocephalic and atraumatic. Mouth/Throat:      Mouth: Mucous membranes are dry. Eyes:      Conjunctiva/sclera: Conjunctivae normal.      Comments: Pupils are approximately 1 mm, eyes midline   Cardiovascular:      Rate and Rhythm: Normal rate and regular rhythm. Pulmonary:      Effort: Pulmonary effort is normal.   Abdominal:      General: Abdomen is flat. Musculoskeletal:         General: No deformity. Skin:     General: Skin is warm and dry.       Comments: Midline sternotomy scar   Neurological:      Comments: Localizing to pain in all extremities, eyes closed open to painful stimulus; no abnormal limb movements   Psychiatric:      Comments: Unresponsive        Diagnostic Study Results     Labs -     Recent Results (from the past 12 hour(s))   BLOOD GAS,CHEM8,LACTIC ACID POC    Collection Time: 12/21/22  1:34 PM   Result Value Ref Range    Calcium, ionized (POC) 1.43 (H) 1.12 - 1.32 mmol/L    BICARBONATE 26 mmol/L    Base deficit (POC) 9.3 mmol/L    Sample source VENOUS BLOOD      CO2, POC 27 (H) 19 - 24 MMOL/L    Sodium,  136 - 145 MMOL/L    Potassium, POC 3.6 3.5 - 5.5 MMOL/L    Chloride,  100 - 108 MMOL/L    Glucose,  (H) 74 - 106 MG/DL    Creatinine, POC 1.2 0.6 - 1.3 MG/DL    Lactic Acid (POC) 4.23 (HH) 0.40 - 2.00 mmol/L    Critical value read back ELDA     pH, venous (POC) 7.00 (LL) 7.32 - 7.42      pCO2, venous (POC) 105.5 (H) 41 - 51 MMHG    pO2, venous (POC) 88 (H) 25 - 40 mmHg   CBC WITH AUTOMATED DIFF    Collection Time: 12/21/22  1:46 PM   Result Value Ref Range    WBC 12.9 (H) 4.1 - 11.1 K/uL    RBC 6.28 (H) 4.10 - 5.70 M/uL    HGB 14.4 12.1 - 17.0 g/dL    HCT 47.9 36.6 - 50.3 %    MCV 76.3 (L) 80.0 - 99.0 FL    MCH 22.9 (L) 26.0 - 34.0 PG    MCHC 30.1 30.0 - 36.5 g/dL    RDW 15.6 (H) 11.5 - 14.5 %    PLATELET 996 099 - 508 K/uL    MPV ABNORMAL 8.9 - 12.9 FL    NRBC 0.0 0  WBC    ABSOLUTE NRBC 0.00 0.00 - 0.01 K/uL    NEUTROPHILS 80 (H) 32 - 75 %    LYMPHOCYTES 12 12 - 49 %    MONOCYTES 5 5 - 13 %    EOSINOPHILS 1 0 - 7 %    BASOPHILS 1 0 - 1 %    IMMATURE GRANULOCYTES 1 (H) 0.0 - 0.5 %    ABS. NEUTROPHILS 10.3 (H) 1.8 - 8.0 K/UL    ABS. LYMPHOCYTES 1.5 0.8 - 3.5 K/UL    ABS. MONOCYTES 0.6 0.0 - 1.0 K/UL    ABS. EOSINOPHILS 0.2 0.0 - 0.4 K/UL    ABS. BASOPHILS 0.1 0.0 - 0.1 K/UL    ABS. IMM. GRANS. 0.2 (H) 0.00 - 0.04 K/UL    DF AUTOMATED     METABOLIC PANEL, COMPREHENSIVE    Collection Time: 12/21/22  1:46 PM   Result Value Ref Range    Sodium 140 136 - 145 mmol/L    Potassium 4.1 3.5 - 5.1 mmol/L    Chloride 103 97 - 108 mmol/L    CO2 29 21 - 32 mmol/L    Anion gap 8 5 - 15 mmol/L    Glucose 178 (H) 65 - 100 mg/dL    BUN 17 6 - 20 MG/DL    Creatinine 1.45 (H) 0.70 - 1.30 MG/DL    BUN/Creatinine ratio 12 12 - 20      eGFR 56 (L) >60 ml/min/1.73m2    Calcium 10.3 (H) 8.5 - 10.1 MG/DL    Bilirubin, total 1.8 (H) 0.2 - 1.0 MG/DL    ALT (SGPT) 30 12 - 78 U/L    AST (SGOT) 44 (H) 15 - 37 U/L    Alk.  phosphatase 162 (H) 45 - 117 U/L    Protein, total 9.0 (H) 6.4 - 8.2 g/dL    Albumin 4.0 3.5 - 5.0 g/dL    Globulin 5.0 (H) 2.0 - 4.0 g/dL    A-G Ratio 0.8 (L) 1.1 - 2.2     MAGNESIUM    Collection Time: 12/21/22  1:46 PM   Result Value Ref Range    Magnesium 2.3 1.6 - 2.4 mg/dL   PHOSPHORUS    Collection Time: 12/21/22  1:46 PM   Result Value Ref Range    Phosphorus 7.0 (H) 2.6 - 4.7 MG/DL   DRUG SCREEN, URINE    Collection Time: 12/21/22  1:46 PM   Result Value Ref Range    AMPHETAMINES Negative NEG      BARBITURATES Negative NEG      BENZODIAZEPINES Positive (A) NEG      COCAINE Negative NEG      METHADONE Negative NEG      OPIATES Negative NEG      PCP(PHENCYCLIDINE) Negative NEG      THC (TH-CANNABINOL) Negative NEG      Drug screen comment (NOTE)    ETHYL ALCOHOL    Collection Time: 12/21/22  1:46 PM   Result Value Ref Range    ALCOHOL(ETHYL),SERUM <10 <10 MG/DL   AMMONIA    Collection Time: 12/21/22  1:46 PM   Result Value Ref Range    Ammonia, plasma 165 (H) <32 UMOL/L   VALPROIC ACID    Collection Time: 12/21/22  1:46 PM   Result Value Ref Range    Valproic acid <3 (L) 50 - 100 ug/ml   TSH 3RD GENERATION    Collection Time: 12/21/22  1:46 PM   Result Value Ref Range    TSH 6.90 (H) 0.36 - 3.74 uIU/mL   BLOOD GAS, ARTERIAL    Collection Time: 12/21/22  3:09 PM   Result Value Ref Range    pH 7.46 (H) 7.35 - 7.45      PCO2 27 (L) 35 - 45 mmHg    PO2 71 (L) 80 - 100 mmHg    O2 SAT 95 92 - 97 %    BICARBONATE 19 (L) 22 - 26 mmol/L    BASE DEFICIT 3.0 mmol/L    O2 METHOD VENT      Sample source ARTERIAL      SITE LEFT RADIAL      BABAK'S TEST YES     COVID-19 RAPID TEST    Collection Time: 12/21/22  3:35 PM   Result Value Ref Range    Specimen source Nasopharyngeal      COVID-19 rapid test Not detected NOTD     URINALYSIS W/ REFLEX CULTURE    Collection Time: 12/21/22  6:26 PM    Specimen: Urine   Result Value Ref Range    Color DARK YELLOW      Appearance CLEAR CLEAR      Specific gravity 1.021      pH (UA) 6.0 5.0 - 8.0      Protein 30 (A) NEG mg/dL    Glucose Negative NEG mg/dL    Ketone TRACE (A) NEG mg/dL    Bilirubin Negative NEG      Blood LARGE (A) NEG      Urobilinogen 4.0 (H) 0.2 - 1.0 EU/dL    Nitrites Positive (A) NEG      Leukocyte Esterase MODERATE (A) NEG      WBC 20-50 0 - 4 /hpf    RBC 20-50 0 - 5 /hpf    Epithelial cells FEW FEW /lpf    Bacteria 1+ (A) NEG /hpf    UA:UC IF INDICATED URINE CULTURE ORDERED (A) CNI Mucus TRACE (A) NEG /lpf    CA Oxalate crystals FEW (A) NEG     GLUCOSE, POC    Collection Time: 12/21/22  8:57 PM   Result Value Ref Range    Glucose (POC) 83 65 - 117 mg/dL    Performed by Mart MOCTEZUMA RN        Radiologic Studies -   CT HEAD WO CONT   Final Result   No acute changes. XR CHEST PORT   Final Result      CHF pattern of pulmonary edema with possible superimposed right pneumonia. Endotracheal tube is 1.5 cm proximal to the sukumar. Consider retraction 1-3 cm. No pneumothorax. CT Results  (Last 48 hours)                 12/21/22 1453  CT HEAD WO CONT Final result    Impression:  No acute changes. Narrative:  EXAM: CT HEAD WO CONT       INDICATION: seizure       COMPARISON: December 2, 2022. CONTRAST: None. TECHNIQUE: Unenhanced CT of the head was performed using 5 mm images. Brain and   bone windows were generated. Coronal and sagittal reformats. CT dose reduction   was achieved through use of a standardized protocol tailored for this   examination and automatic exposure control for dose modulation. FINDINGS:   Remote infarct in the vascular distribution of the left middle cerebral artery,   remote right frontal infarct. No extra-axial fluid collection, hemorrhage or   shift. CXR Results  (Last 48 hours)                 12/21/22 1443  XR CHEST PORT Final result    Impression:      CHF pattern of pulmonary edema with possible superimposed right pneumonia. Endotracheal tube is 1.5 cm proximal to the sukumar. Consider retraction 1-3 cm. No pneumothorax. Narrative:  EXAM: XR CHEST PORT       INDICATION: Respiratory distress, seizure, intubation. COMPARISON: Chest views on 9/2/2022, 4/14/2017, and 10/14/2015. TECHNIQUE: Semiupright portable chest AP view       FINDINGS: Endotracheal tube terminates 1.5 cm proximal to the sukumar. Enteric   tube extends into the proximal lateral stomach.  The fibular battery pack and   leads are unchanged. Sternotomy wires are unchanged. Cardiac monitoring wires   overlie the thorax. Oxygen tubing is visible. [Cardiomegaly is unchanged. Aortic   arch is not enlarged. Pulmonary vascular prominence is mild and new. Asymmetric right more than left interstitial and airspace opacities are   moderate. Pleural spaces are clear. Osteopenia is unchanged. Medical Decision Making   I am the first provider for this patient. I reviewed the vital signs, available nursing notes, past medical history, past surgical history, family history and social history. Vital Signs-Reviewed the patient's vital signs. Patient Vitals for the past 12 hrs:   Temp Pulse Resp BP SpO2   12/21/22 2100 -- 70 22 104/62 --   12/21/22 2000 -- 70 27 109/65 --   12/21/22 1945 98.7 °F (37.1 °C) 70 22 113/75 100 %   12/21/22 1942 -- 70 22 -- 100 %   12/21/22 1930 -- 70 22 106/79 --   12/21/22 1915 -- 70 22 103/70 100 %   12/21/22 1900 98.1 °F (36.7 °C) 71 21 112/80 99 %   12/21/22 1647 97.8 °F (36.6 °C) 70 22 116/70 100 %   12/21/22 1603 -- 70 22 -- 99 %   12/21/22 1428 -- 72 -- 109/62 100 %   12/21/22 1413 -- 71 28 117/67 98 %   12/21/22 1358 -- 70 25 104/65 94 %   12/21/22 1355 -- 79 28 104/65 96 %   12/21/22 1343 -- 85 16 109/61 96 %   12/21/22 1328 -- 85 -- -- 100 %       Records Reviewed: Nursing Notes and Old Medical Records    Provider Notes (Medical Decision Making):   Patient presenting severely CNS depressed following multiple seizures and IM Versed, no respiratory effort at time of my initial evaluation, pupils small but relatively appropriate in size eyes midline and localizing to pain in all extremities    Receiving BVM. I did place a right-sided EJ and proceeded with RSI to protect the patient's airway. We will obtain CT head, Depakote level, UDS ethyl alcohol CBC CMP ammonia.   VBG obtained on patient arrival shows a pH of 7.002 PCO2 of 105, lactate is elevated at 4.23 in setting of known seizure, suspect this is related to his seizure. . RSI was performed with etomidate and rocuronium, will load with 4.5 mg of IV Keppra for status epilepticus. ED Course:   Initial assessment performed. The patients presenting problems have been discussed, and they are in agreement with the care plan formulated and outlined with them. I have encouraged them to ask questions as they arise throughout their visit. ED Course as of 12/21/22 2209   Wed Dec 21, 2022   1349 I did call emergency contact Harlan Sky to get collateral information and give update on patient's condition, left message [WB]   1351 I did call sister Matias Saint, unfortunately she was unable to give collateral, but I was able to update her on patient's condition. She did let me know he had stopped taking his medication for unclear reasons [WB]   1351 Sister Harlan Sky did call back. Patient is coming from [facility]. Patient was vomiting and having a seizure, though she does not have any more information regarding his condition today. I did give her an update on patient's condition.  [WB]   1404 Procedure Note - Orotracheal Intubation:   2:01 PM  Performed by: Wellington Little, supervised by myself Dr Gena Irvin   Indication for procedure: impending respiratory failure and airway compromise  RSI performed. The patient was sedated with 40 mg of etomidate and 100 mg rocuronium IV and orotracheally intubated with a 8.0 cuffed Indonesian endotracheal tube using a MAC 4 blade with direct visualization. Tube was advanced to 26 cm at the teeth. ETT location confirmed by bilateral, symmetric breath sounds and good end-tidal CO2 detector color change . Number of attempts: 1  Complications: none  RSI was used. , Cricoid pressure was applied.   The procedure took 1-15 minutes, and pt tolerated well. [WB]   [facility] was [facility] on [facility] for approximately 20 minutes [WB]   1442 Ammonia level is elevated 165 [WB]   1446 He has NG in place, may consider lactulose, ALT's are mildly elevated with a bili of 1.8, mildly elevated LFTs creatinine 1.45 [WB]   1446 Elevated white blood cell count of 12.9 [WB]   1446 Patient's respiratory depression and need for positive pressure ventilation is due to postictal state, not due to sepsis [WB]   1447 Lactic elevation also due to seizure, not due to sepsis [WB]   1448 Chest x-ray on my interpretation shows pacemaker in place, enlarged but stable cardiomediastinal borders, no effusion.   ET tube which is above the sukumar however somewhat deep, will withdraw 1 to 2 cm [WB]   1501 Yes is positive for benzodiazepines which were given prehospital [WB]   1501 X-ray obtained does show a superimposed right-sided consolidation, suspect aspiration with vomiting prehospital [WB]   1511 ED [facility] ED SEPSIS NOTE:     3:11 PM The patient now meets criteria for: Septic Shock    Fluid resuscitation with: Based on patient's history and ability to tolerate IV fluids, patient and/or family/caregiver refuse the 30cc/kg bolus  Due to concern for rapidly advancing infection and deterioration of patient's condition, antibiotics are started STAT and cultures ordered. [WB]   1512 Accepted for admission by intensivist [WB]   1512 pH now 7.46, pCO2 low at 27, PaO2 is low at 71 [WB]   1528 I've performed a sepsis reassessment of the patient's clinical volume status and tissue perfusion at time 3:28 PM [WB]   1540 CT head shows no acute process [WB]      ED Course User Index  [WB] Bj Waterman MD     CRITICAL CARE NOTE :    2:55 PM    IMPENDING DETERIORATION -CNS  ASSOCIATED RISK FACTORS - CNS Decompensation  MANAGEMENT- Bedside Assessment and Supervision of Care  INTERPRETATION -  Xrays and CT Scan  INTERVENTIONS - CNS medications, intubation  CASE REVIEW - Hospitalist/Intensivist, Nursing, and Family  TREATMENT RESPONSE -Stable  PERFORMED BY - Self    NOTES   :  I have spent 45 minutes of critical care time involved in lab review, consultations with specialist, family decision- making, bedside attention and documentation. This time excludes time spent in any separate billed procedures. During this entire length of time I was immediately available to the patient. Rupa Duff MD    Disposition:  Admission Note:  Patient is being admitted to the hospital by Dr. Waylon Merrill, Service: [address].  The results of their tests and reasons for their admission have been discussed with them and available family. They convey agreement and understanding for the need to be admitted and for their admission diagnosis. Diagnosis     Clinical Impression:   1. Status epilepticus ([address]    2. Acute respiratory failure with hypoxia and hypercapnia (HCC)        Attestations:    Rupa Duff M.D. Please note that this dictation was completed with [employer], the computer voice recognition software. Quite often unanticipated grammatical, syntax, homophones, and other interpretive errors are inadvertently transcribed by the computer software. Please disregard these errors. Please excuse any errors that have escaped final proofreading. Thank you.

## 2022-12-21 NOTE — ED NOTES
TRANSFER - OUT REPORT:    Verbal report given to Juliana BRITT(name) on Chandler  being transferred to CCU(unit) for routine progression of care       Report consisted of patients Situation, Background, Assessment and   Recommendations(SBAR). Information from the following report(s) SBAR, Kardex, ED Summary, and MAR was reviewed with the receiving nurse. Lines:   Peripheral IV 12/21/22 Left Antecubital (Active)       Peripheral IV 12/21/22 Right Antecubital (Active)        Opportunity for questions and clarification was provided.       Patient transported with:   Monitor  Registered Nurse

## 2022-12-21 NOTE — H&P
[facility] INITIAL ASSESSMENT. Name: Brenton Lopez   : 1965   MRN: [id number]   Date: 2022        Chief Complaint   Patient presents with    Respiratory Distress    Seizure         HPI:     59year old male from [facility] intubated for hypercapnic respiratory failure secondary to status epilepticus. He has a known history of seizure disorder. He also has complex medical history which includes, Chronic nonischemic cardiomyopathy LVEF 15%, with Bi V failure, Chronic atrial flutter with prior surgical MAZE in . 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 He was last admitted in 2022 with seizure thought secondary to noncompliance  Today, there was concern for vomit in his airway and cxr with possible infiltrate. He was started on Vanc and unasyn in the ER. .        Assessment/Plan:     PULMONARY:  Hypercapnic respiratory failure  Secondary to versed for seizure and decreased LOC  Now resolved  ? Aspiration     Plan  - MRSA nares  - sputum cx   - Vent settings established, reviewed and/or adjusted as per orders  - bronchodilators  - Daily SBT when meets criteria  - continue antibiotics     CARDIOVASCULAR/HEMODYNAMIC:  Afib / flutter   CHFrEF      Current vasopressors: none     Plan  - hold most home antihypertensive for now due to relative hypotension  - keep beta blocker but at low dose   - MAP goal > 65 mmHg  - decide about restarting systemic anticoagulation (heparin vs home NOAC after head imaging)    RENAL:  Ckd - I       Plan  - Monitor I/Os and Uo  - Monitor renal function panel intermittently  - Correct electrolytes as needed  - Avoid nephrotoxic meds      GI/NUTRITION:  Etoh cirrhosis       Plan  - continue home lactulose         INFECTIOUS DISEASE  ?  Pneumonia    Plan  - continue abx   - follow up cx     NEURO  Metabolic encephalopathy  Seizure disorder    Plan  - eeg  - keppra load in ER  - continue keppra BID  - restart home depakote  - neurology consult  - wean propofol   - Daily SAT when meets ICU criteria  - ABCDEF mobility bundle  - PT/OT involvement when appropriate      ENDO  Hyperglycemia  hypothyroid    Plan  - SSI  - send a1c   - restart home synthroid  - send TSH        GOALS OF CARE/ADVANCED DIRECTIVES  Presumed full - will attempt to contact NOK     DVT prophylaxis: lovenox  SUP: pepcid         I personally spent 120 minutes of critical care time. This is time spent at this critically ill patient's bedside actively involved in patient care as well as the coordination of care and discussions with the patient's family. This does not include any procedural time which has been billed separately. Review of systems:     ROS   Unable to obtain     Objective:     Vital Signs:  Visit Vitals  /62   Pulse 72   Resp 28   Wt 81.6 kg (180 lb)   SpO2 100%   BMI 26.58 kg/m²      O2 Device: None (Room air) No data recorded. Intake/Output:   No intake or output data in the 24 hours ending 12/21/22 1605      Physical Exam  Constitutional:       Appearance: He is ill-appearing. Comments: Intubated sedated   HENT:      Head: Normocephalic. Mouth/Throat:      Mouth: Mucous membranes are dry. Eyes:      Comments: Sluggish pupils   Cardiovascular:      Rate and Rhythm: Normal rate and regular rhythm. Pulses: Normal pulses. Pulmonary:      Breath sounds: Rhonchi present. Abdominal:      General: Abdomen is flat. Palpations: Abdomen is soft. Skin:     General: Skin is dry.        Past Medical History:        has a past medical history of Acid reflux, Alcohol abuse, Arthropathy, unspecified, site unspecified, Atrial fibrillation ([address] Calcaneus fracture, Condyloma, Depression, Essential hypertension, H/O mitral valve repair (2006), High cholesterol, History of angina, Hypertension, IHSS (idiopathic hypertrophic subaortic stenosis) ([facility] [address] Ill-defined condition, Impotence of organic origin, LA thrombus (05/2015), Pacemaker (2006), Thyroid disease, Trichilemmal cyst (4/10/2017), and Unspecified cerebral artery occlusion with cerebral infarction. Past Surgical History:      has a past surgical history that includes hx pacemaker (07/14/2006); hx cyst removal; hx other surgical (4/2005); pr cardiac surg procedure unlist (07/13/2006); hx heart catheterization; hx appendectomy; and hx other surgical (04/20/2017). Home Medications:     Prior to Admission medications    Medication Sig Start Date End Date Taking? Authorizing Provider   lactulose (CHRONULAC) 10 gram/15 mL solution Take 45 mL by mouth daily. 9/10/22   Zaheer Osman MD   valsartan (DIOVAN) 40 mg tablet Take 1 Tablet by mouth daily. 9/10/22   Zaheer Osman MD   cholecalciferol (VITAMIN D3) (50,000 UNITS /1250 MCG) capsule Take  by mouth every Monday and Friday. Indications: low vitamin D levels    Provider, Historical   levETIRAcetam (KEPPRA) 750 mg tablet Take 750 mg by mouth daily. Provider, Historical   traMADoL (ULTRAM) 50 mg tablet Take 50 mg by mouth every six (6) hours as needed for Pain. Provider, Historical   atorvastatin (LIPITOR) 40 mg tablet Take 40 mg by mouth nightly. Avoid with grapefruit juice    Provider, Historical   FLUoxetine (PROzac) 40 mg capsule Take 40 mg by mouth nightly. Provider, Historical   divalproex DR (DEPAKOTE) 125 mg tablet Take 250 mg by mouth every twelve (12) hours. Give 2 capsules every 12 hours    Provider, Historical   acetaminophen (TYLENOL) 500 mg tablet Take  by mouth every eight (8) hours as needed for Pain. Provider, Historical   senna-docusate (Senna with Docusate Sodium) 8.6-50 mg per tablet Take 1 Tablet by mouth daily. Provider, Historical   carvediloL (COREG) 6.25 mg tablet Take 6.25 mg by mouth two (2) times a day.  Indications: high blood pressure    Provider, Historical   levothyroxine (SYNTHROID) 50 mcg tablet Take 50 mcg by mouth daily. On an empty stomach, at least 30 minutes before food. Do not take within 4 hours of iron or calcium, avoid medication containing antacids and MVI w min  Indications: a condition with low thyroid hormone levels    Provider, Historical         Allergies/Social/Family History: Allergies   Allergen Reactions    Bactrim [Sulfamethoxazole-Trimethoprim] Other (comments)     GI Distress      Social History     Tobacco Use    Smoking status: Former     Types: Cigarettes     Quit date: 2020     Years since quittin.3    Smokeless tobacco: Never   Substance Use Topics    Alcohol use: Yes     Alcohol/week: 0.0 standard drinks     Types: 1 - 2 Cans of beer per week     Comment: occ      No family history on file. LABS AND  DATA:   Reviewed      Peak airway pressure: 30 cm H2O    Minute ventilation: 14.7 l/min      CRITICAL CARE CONSULTANT NOTE  I had a face to face encounter with the patient, reviewed and interpreted patient data including clinical events, labs, images, vital signs, I/O's, and examined patient. I have discussed the case and the plan and management of the patient's care with the consulting services, the bedside nurses and the respiratory therapist.      NOTE OF PERSONAL INVOLVEMENT IN CARE   This patient has a high probability of imminent, clinically significant deterioration, which requires the highest level of preparedness to intervene urgently. I participated in the decision-making and personally managed or directed the management of the following life and organ supporting interventions that required my frequent assessment to treat or prevent imminent deterioration.       Geeta Acuna MD   Pulmonary/[facility] Critical Care  [phone]  2022

## 2022-12-21 NOTE — PROGRESS NOTES
1700: Report received from [address]: Attempted to  patient from ER and transported to ICU, EEG in progress. Will come back when EEG is finished. 1830: Pt. Now being transported to his CCU room. Primary Nurse Pb Deleon and Kit Simmons, RN performed a dual skin assessment on this patient. Skin intact, Dimple to sacrum, but intact. 1845: Pt. Bathed, Tolerated it well. Aviles placed per Dr. Sharath Maki. Urine Sample sent to lab, as well as MRSA culture swab. Attempted to draw ordered labs with 2 nurses and several Needle sticks and was unsuccessful. Dr. Sharath Maki aware. Night shift NP to hopefully insert U/S guided PIV for blood draws. Sputum culture not obtained as no sample yet produced in inline suction. 1943: BL wrist restraints now started per Dr. Sharath Maki as patient is pulling at his ETT. 1950: Report given to Huseyin Anderson, RN. They are aware of overdue blood work and new code status of DNR.

## 2022-12-21 NOTE — ACP (ADVANCE CARE PLANNING)
Spoke with REAGAN Hogan for Mr. Rita De Santiago. She states that Mr. Rita De Santiago does not want CPR/ ACLS in the event of cardiac arrest.  \"He's already been through enough\". Code status changed to DNR.      Veronica Fragoso MD [address] [place] [address],# [phone]

## 2022-12-22 LAB
ANION GAP SERPL CALC-SCNC: 10 MMOL/L (ref 5–15)
APTT PPP: 26.8 SEC (ref 22.1–31)
BACTERIA SPEC CULT: NORMAL
BACTERIA SPEC CULT: NORMAL
BUN SERPL-MCNC: 16 MG/DL (ref 6–20)
BUN/CREAT SERPL: 12 (ref 12–20)
CALCIUM SERPL-MCNC: 9.9 MG/DL (ref 8.5–10.1)
CHLORIDE SERPL-SCNC: 111 MMOL/L (ref 97–108)
CO2 SERPL-SCNC: 21 MMOL/L (ref 21–32)
CREAT SERPL-MCNC: 1.38 MG/DL (ref 0.7–1.3)
ERYTHROCYTE [DISTWIDTH] IN BLOOD BY AUTOMATED COUNT: 14.6 % (ref 11.5–14.5)
EST. AVERAGE GLUCOSE BLD GHB EST-MCNC: 120 MG/DL
GLUCOSE BLD STRIP.AUTO-MCNC: 105 MG/DL (ref 65–117)
GLUCOSE BLD STRIP.AUTO-MCNC: 116 MG/DL (ref 65–117)
GLUCOSE BLD STRIP.AUTO-MCNC: 99 MG/DL (ref 65–117)
GLUCOSE SERPL-MCNC: 110 MG/DL (ref 65–100)
HBA1C MFR BLD: 5.8 % (ref 4–5.6)
HCT VFR BLD AUTO: 40.1 % (ref 36.6–50.3)
HGB BLD-MCNC: 13.2 G/DL (ref 12.1–17)
LACTATE SERPL-SCNC: 2.6 MMOL/L (ref 0.4–2)
LACTATE SERPL-SCNC: 2.7 MMOL/L (ref 0.4–2)
MCH RBC QN AUTO: 23.4 PG (ref 26–34)
MCHC RBC AUTO-ENTMCNC: 32.9 G/DL (ref 30–36.5)
MCV RBC AUTO: 71 FL (ref 80–99)
NRBC # BLD: 0 K/UL (ref 0–0.01)
NRBC BLD-RTO: 0 PER 100 WBC
PLATELET # BLD AUTO: 104 K/UL (ref 150–400)
PMV BLD AUTO: ABNORMAL FL (ref 8.9–12.9)
POTASSIUM SERPL-SCNC: 3.2 MMOL/L (ref 3.5–5.1)
PROCALCITONIN SERPL-MCNC: 9.02 NG/ML
RBC # BLD AUTO: 5.65 M/UL (ref 4.1–5.7)
SERVICE CMNT-IMP: NORMAL
SODIUM SERPL-SCNC: 142 MMOL/L (ref 136–145)
T4 FREE SERPL-MCNC: 1.2 NG/DL (ref 0.8–1.5)
THERAPEUTIC RANGE,PTTT: NORMAL SECS (ref 58–77)
WBC # BLD AUTO: 8.1 K/UL (ref 4.1–11.1)

## 2022-12-22 PROCEDURE — 74011250636 HC RX REV CODE- 250/636: Performed by: INTERNAL MEDICINE

## 2022-12-22 PROCEDURE — 94003 VENT MGMT INPAT SUBQ DAY: CPT

## 2022-12-22 PROCEDURE — 80048 BASIC METABOLIC PNL TOTAL CA: CPT

## 2022-12-22 PROCEDURE — 82962 GLUCOSE BLOOD TEST: CPT

## 2022-12-22 PROCEDURE — 74011250637 HC RX REV CODE- 250/637: Performed by: NURSE PRACTITIONER

## 2022-12-22 PROCEDURE — 84439 ASSAY OF FREE THYROXINE: CPT

## 2022-12-22 PROCEDURE — 74011000250 HC RX REV CODE- 250: Performed by: INTERNAL MEDICINE

## 2022-12-22 PROCEDURE — 74011000258 HC RX REV CODE- 258: Performed by: HOSPITALIST

## 2022-12-22 PROCEDURE — 36415 COLL VENOUS BLD VENIPUNCTURE: CPT

## 2022-12-22 PROCEDURE — 85027 COMPLETE CBC AUTOMATED: CPT

## 2022-12-22 PROCEDURE — 74011250637 HC RX REV CODE- 250/637: Performed by: INTERNAL MEDICINE

## 2022-12-22 PROCEDURE — 94640 AIRWAY INHALATION TREATMENT: CPT

## 2022-12-22 PROCEDURE — 99291 CRITICAL CARE FIRST HOUR: CPT | Performed by: INTERNAL MEDICINE

## 2022-12-22 PROCEDURE — 74011250636 HC RX REV CODE- 250/636: Performed by: HOSPITALIST

## 2022-12-22 PROCEDURE — 83605 ASSAY OF LACTIC ACID: CPT

## 2022-12-22 PROCEDURE — 93005 ELECTROCARDIOGRAM TRACING: CPT

## 2022-12-22 PROCEDURE — 84145 PROCALCITONIN (PCT): CPT

## 2022-12-22 PROCEDURE — 85730 THROMBOPLASTIN TIME PARTIAL: CPT

## 2022-12-22 PROCEDURE — 99222 1ST HOSP IP/OBS MODERATE 55: CPT | Performed by: FAMILY MEDICINE

## 2022-12-22 PROCEDURE — 65610000006 HC RM INTENSIVE CARE

## 2022-12-22 PROCEDURE — 74011250636 HC RX REV CODE- 250/636: Performed by: EMERGENCY MEDICINE

## 2022-12-22 RX ORDER — ONDANSETRON 4 MG/1
4 TABLET, ORALLY DISINTEGRATING ORAL
Status: DISCONTINUED | OUTPATIENT
Start: 2022-12-22 | End: 2022-12-28 | Stop reason: HOSPADM

## 2022-12-22 RX ORDER — CHLORHEXIDINE GLUCONATE 0.12 MG/ML
15 RINSE ORAL EVERY 12 HOURS
Status: DISCONTINUED | OUTPATIENT
Start: 2022-12-22 | End: 2022-12-28 | Stop reason: HOSPADM

## 2022-12-22 RX ORDER — POLYETHYLENE GLYCOL 3350 17 G/17G
17 POWDER, FOR SOLUTION ORAL DAILY PRN
Status: DISCONTINUED | OUTPATIENT
Start: 2022-12-22 | End: 2022-12-28 | Stop reason: HOSPADM

## 2022-12-22 RX ORDER — BALSAM PERU/CASTOR OIL
OINTMENT (GRAM) TOPICAL 2 TIMES DAILY
Status: DISCONTINUED | OUTPATIENT
Start: 2022-12-22 | End: 2022-12-28 | Stop reason: HOSPADM

## 2022-12-22 RX ORDER — METOPROLOL TARTRATE 25 MG/1
12.5 TABLET, FILM COATED ORAL EVERY 8 HOURS
Status: DISCONTINUED | OUTPATIENT
Start: 2022-12-22 | End: 2022-12-28 | Stop reason: HOSPADM

## 2022-12-22 RX ORDER — LORAZEPAM 2 MG/ML
0.5 CONCENTRATE ORAL
COMMUNITY

## 2022-12-22 RX ORDER — ACETAMINOPHEN 650 MG/1
650 SUPPOSITORY RECTAL
Status: DISCONTINUED | OUTPATIENT
Start: 2022-12-22 | End: 2022-12-28 | Stop reason: HOSPADM

## 2022-12-22 RX ORDER — LEVOTHYROXINE SODIUM 50 UG/1
50 TABLET ORAL
Status: DISCONTINUED | OUTPATIENT
Start: 2022-12-23 | End: 2022-12-28 | Stop reason: HOSPADM

## 2022-12-22 RX ORDER — ONDANSETRON 2 MG/ML
4 INJECTION INTRAMUSCULAR; INTRAVENOUS
Status: DISCONTINUED | OUTPATIENT
Start: 2022-12-22 | End: 2022-12-28 | Stop reason: HOSPADM

## 2022-12-22 RX ORDER — DIVALPROEX SODIUM 125 MG/1
125 CAPSULE, COATED PELLETS ORAL EVERY 12 HOURS
Status: DISCONTINUED | OUTPATIENT
Start: 2022-12-23 | End: 2022-12-23

## 2022-12-22 RX ORDER — MIDAZOLAM HYDROCHLORIDE 1 MG/ML
1 INJECTION, SOLUTION INTRAMUSCULAR; INTRAVENOUS
Status: DISCONTINUED | OUTPATIENT
Start: 2022-12-22 | End: 2022-12-28 | Stop reason: HOSPADM

## 2022-12-22 RX ORDER — LORAZEPAM 2 MG/ML
0.5 CONCENTRATE ORAL EVERY 6 HOURS
COMMUNITY

## 2022-12-22 RX ORDER — FUROSEMIDE 40 MG/1
40 TABLET ORAL DAILY
COMMUNITY

## 2022-12-22 RX ADMIN — LEVETIRACETAM 1000 MG: 100 INJECTION, SOLUTION, CONCENTRATE INTRAVENOUS at 13:18

## 2022-12-22 RX ADMIN — LACTULOSE 30 ML: 20 SOLUTION ORAL at 22:06

## 2022-12-22 RX ADMIN — SODIUM CHLORIDE, PRESERVATIVE FREE 40 ML: 5 INJECTION INTRAVENOUS at 22:10

## 2022-12-22 RX ADMIN — LACTULOSE 30 ML: 20 SOLUTION ORAL at 08:58

## 2022-12-22 RX ADMIN — LACTULOSE 30 ML: 20 SOLUTION ORAL at 16:58

## 2022-12-22 RX ADMIN — DIVALPROEX SODIUM 125 MG: 125 CAPSULE, COATED PELLETS ORAL at 21:59

## 2022-12-22 RX ADMIN — CASTOR OIL AND BALSAM, PERU: 788; 87 OINTMENT TOPICAL at 20:12

## 2022-12-22 RX ADMIN — ALBUTEROL SULFATE 2.5 MG: 2.5 SOLUTION RESPIRATORY (INHALATION) at 20:23

## 2022-12-22 RX ADMIN — ACETAMINOPHEN 650 MG: 325 TABLET ORAL at 19:45

## 2022-12-22 RX ADMIN — AMPICILLIN SODIUM AND SULBACTAM SODIUM 3 G: 2; 1 INJECTION, POWDER, FOR SOLUTION INTRAMUSCULAR; INTRAVENOUS at 17:01

## 2022-12-22 RX ADMIN — ALBUTEROL SULFATE 2.5 MG: 2.5 SOLUTION RESPIRATORY (INHALATION) at 14:54

## 2022-12-22 RX ADMIN — LEVETIRACETAM 1000 MG: 100 INJECTION, SOLUTION, CONCENTRATE INTRAVENOUS at 00:50

## 2022-12-22 RX ADMIN — PROPOFOL 45 MCG/KG/MIN: 10 INJECTION, EMULSION INTRAVENOUS at 04:51

## 2022-12-22 RX ADMIN — CHLORHEXIDINE GLUCONATE 15 ML: 1.2 RINSE ORAL at 20:11

## 2022-12-22 RX ADMIN — LEVOTHYROXINE SODIUM 50 MCG: 0.05 TABLET ORAL at 06:02

## 2022-12-22 RX ADMIN — Medication 1 AMPULE: at 08:57

## 2022-12-22 RX ADMIN — ENOXAPARIN SODIUM 40 MG: 100 INJECTION SUBCUTANEOUS at 08:58

## 2022-12-22 RX ADMIN — CHLORHEXIDINE GLUCONATE 15 ML: 1.2 RINSE ORAL at 09:05

## 2022-12-22 RX ADMIN — ALBUTEROL SULFATE 2.5 MG: 2.5 SOLUTION RESPIRATORY (INHALATION) at 01:59

## 2022-12-22 RX ADMIN — CASTOR OIL AND BALSAM, PERU: 788; 87 OINTMENT TOPICAL at 11:49

## 2022-12-22 RX ADMIN — POTASSIUM BICARBONATE 60 MEQ: 782 TABLET, EFFERVESCENT ORAL at 06:04

## 2022-12-22 RX ADMIN — SODIUM CHLORIDE, PRESERVATIVE FREE 10 ML: 5 INJECTION INTRAVENOUS at 06:04

## 2022-12-22 RX ADMIN — SODIUM CHLORIDE, PRESERVATIVE FREE 20 MG: 5 INJECTION INTRAVENOUS at 20:11

## 2022-12-22 RX ADMIN — PROPOFOL 45 MCG/KG/MIN: 10 INJECTION, EMULSION INTRAVENOUS at 08:55

## 2022-12-22 RX ADMIN — SODIUM CHLORIDE, PRESERVATIVE FREE 20 MG: 5 INJECTION INTRAVENOUS at 08:58

## 2022-12-22 RX ADMIN — Medication 1 AMPULE: at 20:11

## 2022-12-22 RX ADMIN — METOPROLOL TARTRATE 12.5 MG: 25 TABLET, FILM COATED ORAL at 22:06

## 2022-12-22 RX ADMIN — METOPROLOL TARTRATE 12.5 MG: 25 TABLET, FILM COATED ORAL at 13:18

## 2022-12-22 RX ADMIN — AMPICILLIN SODIUM AND SULBACTAM SODIUM 3 G: 2; 1 INJECTION, POWDER, FOR SOLUTION INTRAMUSCULAR; INTRAVENOUS at 12:06

## 2022-12-22 RX ADMIN — DIVALPROEX SODIUM 125 MG: 125 CAPSULE, COATED PELLETS ORAL at 08:58

## 2022-12-22 RX ADMIN — SODIUM CHLORIDE, PRESERVATIVE FREE 10 ML: 5 INJECTION INTRAVENOUS at 13:19

## 2022-12-22 RX ADMIN — METOPROLOL TARTRATE 12.5 MG: 25 TABLET, FILM COATED ORAL at 06:02

## 2022-12-22 RX ADMIN — ALBUTEROL SULFATE 2.5 MG: 2.5 SOLUTION RESPIRATORY (INHALATION) at 08:30

## 2022-12-22 NOTE — PROGRESS NOTES
Palliative Medicine      Code Status: DNR    Advance Care Planning:  Advance Care Planning 12/22/22   Patient's Healthcare Decision Maker is: Named in AMD   Primary Decision Maker Name -Harlan Sky,    Confirm Advance Directive AMD on file   Patient Would Like to Complete Advance Directive Unable     Patient / Family Encounter Documentation    Participants (names): Dr. Paz Amado, Deachip Plush, [facility], Harlan Sky (sister by phone) Patient was seen, unresponsive, sedated on ventilator, in no apparent distress. Narrative: Palliative team visited patient and got update from ICU nurse, Tim Grayson. Reviewed chart and AMD. Called sisterNick to introduce Palliative team and give update and get more background on patient from his sister. Scheduled meeting with mPOA and sister Jairon Alvarado tomorrow at 2p to discuss [address] and provide support/education. Psychosocial Issues Identified/ Resilience Factors:   Patient has been a \"slow motion person\" all of his life, and prior to this hospitalization he was walking, feeding himself and visiting his family. The last time he and Jerald Nuñez were together in person was on Thanksgiving and she reports that they would talk on the phone every other day since he moved to [address] [place] [address] in 2019. Prior to that he lived with his sister Jerald Nuñez and her family from 2012 after having a stroke. She had told him he could no longer stay with her because he wasn't taking his medications. She and her family take him out to eat and to visit with them in their home. Recently she reported he was suffering \"from a lot of depression\" due to missing his family, his son, daughter, and grandchildren. He has lost contact with them and Jerald Nuñez has not been able to reach them either. She thinks he may have been refusing his medications due to feeling depressed. She thought he was getting an anti-depressant at [facility]. He expresses frustration that he can't talk because of his stroke.  He points and has communication cards. He is still able to curse a few words when really frustrated. Caregiver [place]: low  Does the caregiver feel confident administering medication? SNF resident  Does the caregiver need any help connecting with community resources? Yes. Does the caregiver feel confident assisting with activities of daily living? [facility] resident    Goals of Care / Plan:   Patient will have symptoms managed. MRI/EEG tests will be done and [facility]. Palliative team will meet with mPOA and sister Jaun Sanchez tomorrow, 2p. Palliative team will continue to provide education and support as appropriate. Thank you for including Palliative Medicine in the care of [facility].      Fadi Garzon, ALEXX  [phone] ([phone])

## 2022-12-22 NOTE — PROGRESS NOTES
1900- Report received from Konstantin Jacobs 350 assessment complete, see flowsheet for details. Pt withdraws from pain, no command following, ETT 25 @ lip, OGT 57 @ lip, suction set up to intermittent suction, SCDs and heel boots on pt, bishop draining. 0000- Reassessment complete, no changes to previous assessment. 0400- Reassessment complete, no changes to previous assessment.  Pt given CHG/soap and water bath, linen change, gown change    0604- Potassium 3.2, notified Dunia Carvajal NP, see MAR for details    0730- Report given to Main Amador, [facility]

## 2022-12-22 NOTE — PROGRESS NOTES
Critical Care Progress Note  Lauren Cobb MD          Date of Service:  2022  NAME:  Nidia Hall  :  1965  MRN:  [id number]      Subjective/Hospital course:      : Patient is weaned off of sedation this morning, no more seizure activity seen but remains unresponsive. Active Problems Being Managed:         Assessment/Plan:     PULMONARY:  Hypercapnic respiratory failure  Secondary to versed for seizure and encephalopathy. ? Aspiration      Plan  - MRSA nares  - sputum cx   - Vent settings established, reviewed and/or adjusted as per orders  - bronchodilators  - Daily SBT when meets criteria  - continue antibiotics      CARDIOVASCULAR/HEMODYNAMIC:  Afib / flutter   CHFrEF        Current vasopressors: none      Plan  - hold most home antihypertensive for now due to relative hypotension  - keep beta blocker but at low dose   - MAP goal > 65 mmHg  - decide about restarting systemic anticoagulation (heparin vs home NOAC after head imaging)     RENAL:  Ckd - I         Plan  - Monitor I/Os and Uo  - Monitor renal function panel intermittently  - Correct electrolytes as needed  - Avoid nephrotoxic meds        GI/NUTRITION:  Etoh cirrhosis         Plan  - continue home lactulose            INFECTIOUS DISEASE  ? Pneumonia     Plan  - continue abx   - follow up cx      NEURO  Metabolic encephalopathy  Seizure disorder     Plan  - eeg  - keppra load in ER  - continue keppra BID  - restart home depakote  - neurology consult  - wean propofol   - Daily SAT when meets ICU criteria  - ABCDEF mobility bundle  - PT/OT involvement when appropriate        ENDO  Hyperglycemia  hypothyroid     Plan  - SSI  - send a1c   - restart home synthroid  - send TSH           GOALS OF CARE/ADVANCED DIRECTIVES  Presumed full - will attempt to contact NOK      DVT prophylaxis: lovenox  SUP: pepcid     Care Plan discussed with: Nurse      I personally spent 40 minutes of critical care time.   This is time spent at this critically ill patient's bedside actively involved in patient care as well as the coordination of care and discussions with the patient's family. This does not include any procedural time which has been billed separately. Review of Systems:   Review of Systems   Unable to perform ROS: Medical condition        Vital Signs:   Patient Vitals for the past 4 hrs:   BP Pulse Resp SpO2   12/22/22 1143 -- 70 22 99 %   12/22/22 1000 124/79 70 22 --   12/22/22 0900 112/76 70 22 --        Intake/Output Summary (Last 24 hours) at 12/22/2022 1237  Last data filed at 12/22/2022 2180  Gross per 24 hour   Intake 524.65 ml   Output 1555 ml   Net -1030.35 ml        Physical Examination:    Physical Exam  Constitutional:       Comments: Intubated, unresponsive. HENT:      Head: Normocephalic and atraumatic. Mouth/Throat:      Mouth: Mucous membranes are moist.   Eyes:      Conjunctiva/sclera: Conjunctivae normal.   Cardiovascular:      Rate and Rhythm: Normal rate and regular rhythm. Pulmonary:      Effort: No respiratory distress. Breath sounds: No wheezing. Comments: On mechanical ventilation  Abdominal:      General: Abdomen is flat. There is no distension. Palpations: Abdomen is soft. Tenderness: There is no abdominal tenderness. There is no guarding. Musculoskeletal:      Cervical back: Neck supple. Neurological:      Comments: Unresponsive       Labs and Imaging:   Reviewed.       Medications:     Current Facility-Administered Medications   Medication Dose Route Frequency    alcohol 62% (NOZIN) nasal  1 Ampule  1 Ampule Topical Q12H    balsam peru-castor oiL (VENELEX) ointment   Topical BID    chlorhexidine (ORAL CARE KIT) 0.12 % mouthwash 15 mL  15 mL Oral Q12H    ampicillin-sulbactam (UNASYN) 3 g in 0.9% sodium chloride (MBP/ADV) 100 mL MBP  3 g IntraVENous Q6H    propofol (DIPRIVAN) 10 mg/mL infusion  0-50 mcg/kg/min IntraVENous TITRATE    sodium chloride (NS) flush 5-40 mL  5-40 mL IntraVENous Q8H    sodium chloride (NS) flush 5-40 mL  5-40 mL IntraVENous PRN    acetaminophen (TYLENOL) tablet 650 mg  650 mg Oral Q6H PRN    Or    acetaminophen (TYLENOL) suppository 650 mg  650 mg Rectal Q6H PRN    polyethylene glycol (MIRALAX) packet 17 g  17 g Oral DAILY PRN    ondansetron (ZOFRAN ODT) tablet 4 mg  4 mg Oral Q8H PRN    Or    ondansetron (ZOFRAN) injection 4 mg  4 mg IntraVENous Q6H PRN    enoxaparin (LOVENOX) injection 40 mg  40 mg SubCUTAneous DAILY    levETIRAcetam (KEPPRA) injection 1,000 mg  1,000 mg IntraVENous Q12H    divalproex (DEPAKOTE SPRINKLE) capsule 125 mg  125 mg Oral Q12H    lactulose (CHRONULAC) 10 gram/15 mL solution 30 mL  20 g Oral TID    albuterol (PROVENTIL VENTOLIN) nebulizer solution 2.5 mg  2.5 mg Nebulization Q6H RT    insulin lispro (HUMALOG) injection   SubCUTAneous Q6H    glucose chewable tablet 16 g  4 Tablet Oral PRN    glucagon (GLUCAGEN) injection 1 mg  1 mg IntraMUSCular PRN    dextrose 10% infusion 0-250 mL  0-250 mL IntraVENous PRN    metoprolol tartrate (LOPRESSOR) tablet 12.5 mg  12.5 mg Oral Q8H    famotidine (PF) (PEPCID) 20 mg in 0.9% sodium chloride 10 mL injection  20 mg IntraVENous Q12H    levothyroxine (SYNTHROID) tablet 50 mcg  50 mcg Oral 6am     ______________________________________________________________________  EXPECTED LENGTH OF STAY: - - -  ACTUAL LENGTH OF STAY:          [address] Alvino Holbrook MD   Pulmonary/CCM  [address]  [phone]  12/22/2022

## 2022-12-22 NOTE — PROCEDURES
EEG REPORT    Patient Name: Rashaad Cervantes  : 1965  Age: 59 y.o. Ordering physician: No ref. provider found  Date of EEG start time: 2022 at 5:06 PM  Date of EEG end time: 2022 at 5:43 PM  EEG procedure number: [id number]  Diagnosis: Seizure 43 1943  Interpreting physician: Ernst Irizarry. Haylie Mata. FAAN    Procedure: EEG    CLINICAL INDICATION: The patient is a 59 y.o. male who is being evaluated for baseline electro cerebral activities and to rule out seizure focus.       Current Facility-Administered Medications   Medication Dose Route Frequency    propofol (DIPRIVAN) 10 mg/mL infusion  0-50 mcg/kg/min IntraVENous TITRATE    vancomycin (VANCOCIN) 1500 mg in  ml infusion  1,500 mg IntraVENous NOW    sodium chloride (NS) flush 5-40 mL  5-40 mL IntraVENous Q8H    sodium chloride (NS) flush 5-40 mL  5-40 mL IntraVENous PRN    acetaminophen (TYLENOL) tablet 650 mg  650 mg Oral Q6H PRN    Or    acetaminophen (TYLENOL) suppository 650 mg  650 mg Rectal Q6H PRN    polyethylene glycol (MIRALAX) packet 17 g  17 g Oral DAILY PRN    ondansetron (ZOFRAN ODT) tablet 4 mg  4 mg Oral Q8H PRN    Or    ondansetron (ZOFRAN) injection 4 mg  4 mg IntraVENous Q6H PRN    [START ON 2022] enoxaparin (LOVENOX) injection 40 mg  40 mg SubCUTAneous DAILY    [START ON 2022] levETIRAcetam (KEPPRA) injection 1,000 mg  1,000 mg IntraVENous Q12H    divalproex (DEPAKOTE SPRINKLE) capsule 125 mg  125 mg Oral Q12H    lactulose (CHRONULAC) 10 gram/15 mL solution 30 mL  20 g Oral TID    albuterol (PROVENTIL VENTOLIN) nebulizer solution 2.5 mg  2.5 mg Nebulization Q6H RT    insulin lispro (HUMALOG) injection   SubCUTAneous Q6H    glucose chewable tablet 16 g  4 Tablet Oral PRN    glucagon (GLUCAGEN) injection 1 mg  1 mg IntraMUSCular PRN    dextrose 10% infusion 0-250 mL  0-250 mL IntraVENous PRN    metoprolol tartrate (LOPRESSOR) tablet 12.5 mg  12.5 mg Oral Q8H    famotidine (PF) (PEPCID) 20 mg in 0.9% sodium chloride 10 mL injection  20 mg IntraVENous Q12H    [START ON 12/22/2022] levothyroxine (SYNTHROID) tablet 50 mcg  50 mcg Oral 6am           DESCRIPTION OF PROCEDURE:   This is a digitally recorded electroencephalogram.  Electrodes were applied in accordance with the international 10-20 system of electrode placement. 18 channels of scalp EEG are recorded. A channel was used for EoG. Another channel was used for ECG. The data is stored digitally and reviewed in reformatted montages for optimal display. EEG was reviewed in both bipolar and referential montages. Description of Activity:  At the onset of recording, there is a notable asymmetry with more than 50% difference in amplitude in the hemispheres with the left hemisphere showing attenuated amplitudes compared to the right hemisphere. There is also an asymmetry in frequency where we see continuous slowing in the left hemisphere with comparison to the right hemisphere. In the right hemisphere we see frequencies in the delta to theta range with no clear posterior dominant rhythm with overriding beta and alpha frequencies. In the left hemisphere, we see frequencies in the delta range. The anterior to posterior gradient is reversed. There is variability, continuity, reactivity with state changes. Throughout the recording, there are frequent 1 Hz sharply contoured medium amplitude discharges with maximal negativity at Fp2 > F4 which suggest focal cortical irritability in the right frontal head region. Hyperventilation was not performed. Photic stimulation produced no response in the posterior head regions. During the recording, we do see evidence of better formed sleep architecture in the right hemisphere evident by sleep spindles and vertex sharp waves. These are not as well-formed in the left hemisphere. There is evidence of chewing artifact as well as diffuse beta seen throughout the study.     There is no evidence of electrographic seizures or events concerning for clinical seizures. Single-channel EKG is regular in rate and rhythm. Clinical Interpretation: This EEG is abnormal for the following reasons:  - Profound asymmetry with slowing and attenuated amplitudes in the left hemisphere which is suggestive of a focal structural deficit, consistent with the patient's known left MCA stroke. - Frequent sharply contoured discharges maximal in the right frontal lobe suggestive of cortical irritability and potential epileptogenicity which is consistent with the patient's known right frontal stroke. This area does have a high risk for seizures. - Moderate diffuse slowing with poor organization which is suggestive of global cerebral dysfunction which can be seen in a toxic-metabolic encephalopathies of nonspecific etiology. There were no electrographic seizures or clinical events of concern. If further suspicion persists, would recommend obtaining a continuous EEG study. Clinical correlation recommended. Judie Viramontes.

## 2022-12-22 NOTE — CONSULTS
Date of Consultation:  December 22, 2022    Referring Physician: Sandy Magdaleno MD    Reason for Consultation: Status epilepticus    Chief Complaint   Patient presents with    Respiratory Distress    Seizure       History of Present Illness:   Rian Lowe is a 59 y.o. male with history of alcohol use disorder with alcoholic liver cirrhosis and elevated LFTs, prior history of multiple strokes including right frontal lobe and left MCA territory, atrial fibrillation on Xarelto, hypertension, hyperlipidemia, s/p pacemaker, as well as alcohol withdrawal seizures and medication noncompliance, on Keppra at home 750 mg twice daily. The patient is unable to provide a history. Per chart review, the patient had multiple seizures prior to EMS arrival with associated vomiting. He had approximately 20 minutes of seizure activity and approximately 3 total seizures with only seconds of postictal periods and never returned to baseline concerning for status epilepticus. An additional seizure was witnessed per EMS in route and given 5 mg of IM Versed. On arrival, he was given a total of 4.5 mg of IV Keppra for status epilepticus. He had no respiratory effort at the time of evaluation by the ED. He was intubated to protect airway. He had acute hypercapnic respiratory failure in the setting of convulsive status epilepticus and was subsequently intubated. Lactate was 4.23. Per patient's family member, the patient was not taking Depakote and Depakote level was undetectable in the serum. Keppra level was not obtained. The patient has been seen by neurology in the past and has had bouts of hypoglycemic induced seizures. He does have a history of noncompliance with Keppra. On last neurology evaluation in September, the patient was seen by my colleague Dr. Behzad Veliz, and at that time the patient was only taking Keppra 1000 mg twice daily.   There was no evidence of Depakote and his medication list.    The patient lives at [facility]. Past Medical History:   Diagnosis Date    Acid reflux     Alcohol abuse     Arthropathy, unspecified, site unspecified     Atrial fibrillation ([facility] [address]     Calcaneus fracture     Condyloma     Depression     Essential hypertension     H/O mitral valve repair     Along with MAZE procedure    High cholesterol     History of angina     Hypertension     IHSS (idiopathic hypertrophic subaortic stenosis) ([facility])     Ill-defined condition     pacemaker    Impotence of organic origin     LA thrombus 2015    LA and ARNULFO thrombus    Pacemaker     Thyroid disease     Trichilemmal cyst 4/10/2017    Unspecified cerebral artery occlusion with cerebral infarction         Past Surgical History:   Procedure Laterality Date    HX APPENDECTOMY      HX CYST REMOVAL      HX HEART CATHETERIZATION      HX OTHER SURGICAL  2005    HX OTHER SURGICAL  2017    excision of scalp mass    HX PACEMAKER  2006    OH CARDIAC SURG PROCEDURE UNLIST  2006    Mitral Valve Replacement        No family history on file. Social History     Tobacco Use    Smoking status: Former     Types: Cigarettes     Quit date: 2020     Years since quittin.3    Smokeless tobacco: Never   Substance Use Topics    Alcohol use: Yes     Alcohol/week: 0.0 standard drinks     Types: 1 - 2 Cans of beer per week     Comment: occ        Allergies   Allergen Reactions    Bactrim [Sulfamethoxazole-Trimethoprim] Other (comments)     GI Distress        Prior to Admission medications    Medication Sig Start Date End Date Taking? Authorizing Provider   LORazepam (LORazepam IntensoL) 2 mg/mL concentrated solution Take 0.5 mg by mouth every six (6) hours. Yes Provider, Historical   LORazepam (LORazepam IntensoL) 2 mg/mL concentrated solution Take 0.5 mg by mouth every six (6) hours as needed for Anxiety.    Yes Provider, Historical   morphine sulfate (MORPHINE CONCENTRATE 20 MG/ML ORAL SOLUTION, [facility] ONLY,) Take 5 mg by mouth every four (4) hours as needed for Pain. Yes Provider, Historical   furosemide (LASIX) 40 mg tablet Take 40 mg by mouth daily. Yes Provider, Historical   levETIRAcetam (KEPPRA) 750 mg tablet Take 750 mg by mouth daily. Yes Provider, Historical   FLUoxetine (PROzac) 40 mg capsule Take 40 mg by mouth nightly. Yes Provider, Historical   traMADoL (ULTRAM) 50 mg tablet Take 50 mg by mouth every six (6) hours as needed for Pain. Provider, Historical   acetaminophen (TYLENOL) 500 mg tablet Take  by mouth every eight (8) hours as needed for Pain. Provider, Historical       Review of Systems:    General, constitutional: negative  Eyes, vision: negative  Ears, nose, throat: negative  Cardiovascular, heart: negative  Respiratory: negative  Gastrointestinal: negative  Genitourinary: negative  Musculoskeletal: negative  Skin and integumentary: negative  Psychiatric: negative  Endocrine: negative  Neurological: negative, except for HPI  Hematologic/lymphatic: negative  Allergy/immunology: negative    []Unable to obtain  ROS due to  []mental status change  []sedated   []intubated      PHYSICAL EXAMINATION:   Visit Vitals  /79   Pulse 70   Temp 98.3 °F (36.8 °C)   Resp 22   Wt 180 lb (81.6 kg)   SpO2 99%   BMI 26.58 kg/m²   Neurological examination  Intubated on sedation  Neck: no carotid bruits  Lungs: On mechanical ventilation  Heart:  no murmurs, regular rate  Lower extremity: no edema    Language: coma    Cranial nerves:   Pupils are fixed pinpoint not reactive  Per nurse patient does have cough and gag when sedation is lightened. Oculocephalic reflex is impaired. Corneal absent. Face appears symmetric      Motor: Tone normal  No spontaneous limb movement      Sensory:  No withdrawal to painful stim    Reflexes:    Right Left  Biceps  2 2  Triceps  2 2  Brachiorad.  2 2  Patella  2 2  Achilles 1 bilaterally   Plantar response:  flexor bilaterally      Cerebellar testing:  no abnormal movements      Gait: unable to assess due to cognitive status      Data:     Lab Results   Component Value Date/Time    Hemoglobin A1c 5.8 (H) 12/21/2022 01:46 PM    Sodium 142 12/22/2022 04:24 AM    Potassium 3.2 (L) 12/22/2022 04:24 AM    Chloride 111 (H) 12/22/2022 04:24 AM    Glucose 110 (H) 12/22/2022 04:24 AM    BUN 16 12/22/2022 04:24 AM    Creatinine 1.38 (H) 12/22/2022 04:24 AM    Calcium 9.9 12/22/2022 04:24 AM    WBC 8.1 12/22/2022 04:24 AM    HCT 40.1 12/22/2022 04:24 AM    HGB 13.2 12/22/2022 04:24 AM    PLATELET 179 (L) 76/00/9540 04:24 AM       Imaging:    No results found for this or any previous visit. Results from [place] encounter on 12/21/22    CT HEAD WO CONT    Narrative  EXAM: CT HEAD WO CONT    INDICATION: seizure    COMPARISON: December 2, 2022. CONTRAST: None. TECHNIQUE: Unenhanced CT of the head was performed using 5 mm images. Brain and  bone windows were generated. Coronal and sagittal reformats. CT dose reduction  was achieved through use of a standardized protocol tailored for this  examination and automatic exposure control for dose modulation. FINDINGS:  Remote infarct in the vascular distribution of the left middle cerebral artery,  remote right frontal infarct. No extra-axial fluid collection, hemorrhage or  shift. Impression  No acute changes. IMPRESSION/RECOMMENDATIONS:  Kimberly Brown is a 59 y.o. male who presents with hypercapnic respiratory failure secondary to convulsive status epilepticus with known history of seizure disorder in the setting of medication noncompliance and alcohol use disorder, transferred to the ICU s/p intubation and sedation. Patient was loaded with Keppra and increased from home dose of 750 mg twice daily to 1000 mg twice daily. Head CT shows chronic right frontal stroke and left MCA territory stroke. No new findings.     Routine EEG showed no seizures, there is epileptiform discharges in the area of the right frontal stroke as well as slowing seen in the left hemisphere. Ammonia level 165. Keppra level was not checked. Depakote level undetectable. Recommendations:  - Continue 1000 mg twice daily of Keppra  - May discontinue Depakote as the patient was not getting this medication while in the nursing home per pharmacy and may also worsen encephalopathy and LFTs which are already elevated; Depakote level not detectable  - Seizure precautions  - Administer 5 mg IV Ativan for seizure lasting more than 5 minutes or IM Versed  - If patient still encephalopathic after weaning sedation, would recommend brain MRI with and without contrast as well as routine EEG        Thank you very much for this consultation. Neurology will continue to follow. I spent 55 minutes providing critical care to this acutely ill inpatient with > 50% of the time counseling and assisting in the coordination of care of the patient on the patient's hospital floor/unit.      Claudetta Goodie, DO

## 2022-12-22 NOTE — PROGRESS NOTES
Code Status: DNR    Advance Care Planning:  Advance Care Planning 12/22/22   Patient's Healthcare Decision Maker is: Named in AMD   Primary Decision Maker Name -Jovany Magaña,    Confirm Advance Directive AMD on file   Patient Would Like to Complete Advance Directive Unable     Patient / Family Encounter Documentation    Participants (names): Dr. Aurora Coon, Bruno Paulino, [facility] [place] [facility], Jovany Magaña (sister by phone) Patient was seen, unresponsive, sedated on ventilator, in no apparent distress. Narrative: Palliative team visited patient and got update from ICU nurse, Jayant Newsome. Reviewed chart and AMD. Called sister, Nick to introduce Palliative team and give update and get more background on patient from his sister. Review of AMD confirms patient has named sister Jovany Magaña as primary decisionmaker, brother in law Ita Burton as secondary, and his niece, Liang Ochoa as 3rd agent for health care decisions. Names and contacts were added to patient chart.

## 2022-12-22 NOTE — PROGRESS NOTES
Problem: Non-Violent Restraints  Goal: Removal from restraints as soon as assessed to be safe  Outcome: Not Progressing Towards Goal  Goal: No harm/injury to patient while restraints in use  Outcome: Progressing Towards Goal  Goal: Patient's dignity will be maintained  Outcome: Progressing Towards Goal  Goal: Patient Interventions  Outcome: Progressing Towards Goal

## 2022-12-22 NOTE — PROGRESS NOTES
0730 - Bedside and Verbal shift change report given to [address]  (oncoming nurse) by Larry lane RN (offgoing nurse). Report included the following information SBAR, Kardex, ED Summary, and Intake/Output. 0800 - Shift assessment completed. Pupils round, pinpoint, and fixed. Withdraws from pain. Continue restraints. OG tube turned back on to intermittent suction w/ orange output of 150. Ventilator settings at 30% FiO2 and breathing w/ the vent at rate of 22. Large amount of sputum thick and yellow. Aviles catheter output 400 since 7am.     [id number] - Sedation Vacation started. Neurology MD at bedside w/ recommendations of IV Depakote vs Capsule d/t poor absorption. Restraints renewed. Medications given and OG tube clamped. 1030 - Interdisciplinary team rounds were held 12/22/2022 with the following team members:Care Management, Nursing, Nutrition, Pharmacy, Physician, and Respiratory Therapy     Plan of care discussed. See clinical pathway and/or care plan for interventions and desired outcomes. Goals of the Day:   -Continue IV Keppra  -Continue holding propofol and sedation vacation  -Continue Lactulose      1200 Reassessment complete. Patient occasionally opens eye to stimulus. 1600 Reassessment complete. No new changes    1900 Bedside and Verbal shift change report given to Staci Cortés RN (oncoming nurse) by Candida Vallejo RN (offgoing nurse). Report included the following information SBAR, Kardex, Procedure Summary, and Recent Results.

## 2022-12-22 NOTE — CONSULTS
Palliative Medicine Consult  Beny: [phone])    Patient Name: Ema Posadas  YOB: 1965    Date of Initial Consult: 12/22/22  Reason for Consult: Care Decisions  Requesting Provider: Dr. Reginaldo Calderon   Primary Care Physician: Dustin Etienne MD     SUMMARY:   Ema Posadas is a 59 y.o. with Chronic nonischemic cardiomyopathy LVEF 15%, atrial flutter, MVR, dual chamber pacemaker, CVA with residual R-sided weakness and aphasia and EtOH cirrhosis. He was last admitted in 9/2022 with seizure thought secondary to noncompliance. He was admitted on 12/21/2022 from [facility] with a diagnosis of acute hypercapnic respiratory failure d/t status epilepticus. He is intubated, Sedation is to be weaned this morning. He is receiving antibiotics for possible right sided pneumonia, and there was concern for vomit in his airway prior to intubation. Current medical issues leading to Palliative Medicine involvement include: Care Decisions. Social History and Recent Events:  Per sister and Nick Chaparroon: He had a CVA in 2012 and lived with Kj Mireles after that until around 2019 went he went to a facility after experiencing a seizure. He has mostly been in facilities since this time. Kj Mireles notes that her brother has a history \"of not taking his medications like he is supposed to. \" He has difficulty with speech at baseline d/t his history of stroke, though he can say a few words and communicate with gesturing. She says he is slow but active at baseline. He is able to walk and feed himself. He likes to watch tv. He visits with his sister Kj Mireles sometimes, and she last saw him on Thanksgiving. She talks on the phone with her brother about every other day. She says he has depression in part because he is not able to interact with his family as much as he would like since he needs to live in a facility. She thinks this may have contributed to his not taking his medications as prescribed.      PALLIATIVE DIAGNOSES: Encounter for Palliative Care  Goals of Care discussion  Unresponsive/Impaired cognitive ability  Expressive aphasia at baseline  Physical Debility/Generalized weakness  Restlessness--has required restraints     PLAN:   We visited patient this morning. He was intubated and off sedation. We spoke with ICU RN regarding patient status. We spoke with patient's sister and Nick Stuart this afternoon. She provided the history above. We shared an update on his hospital course, imaging results and current specialist recommendations. Discussed that we will need to monitor his status to determine if he will become more alert and be able to be extubated in the coming days. Counseled that he may need an MRI and EEG soon per Neuro evaluation. We reviewed patient's AMD with Pablo. She shares that there have been no major changes. Thank you for asking our team to participate in the care of [facility]. We will plan to meet with Pablo Friday at 2 PM to discuss her brother's status and progress.      GOALS OF CARE / TREATMENT PREFERENCES:     GOALS OF CARE:       TREATMENT PREFERENCES:   Code Status: DNR    Patient and family's personal goals include: Anuj Stuart plans to visit her brother Friday    Important upcoming milestones or family events: Patient's other sister is coming from [place] to visit patient tomorrow    The patient identifies the following as important for living well: patient cannot state/participate d/t medical condition      Advance Care Planning:  [] The [facility] Interdisciplinary Team has updated the ACP Navigator with Health Care Decision Maker and Patient Capacity      Primary Decision Maker: Christie Bishop Sister - [phone]    Secondary Decision Maker: Surinder Wagner - Other Relative    Supplemental (Other) Decision Maker: [address] Planning 6/24/2016   Patient's Healthcare Decision Maker is: Legal Next of Evinva 67   Primary Decision Maker Name -   Confirm Advance Directive None Patient Would Like to Complete Advance Directive No               Other:    As far as possible, the palliative care team has discussed with patient / health care proxy about goals of care / treatment preferences for patient. HISTORY:     History obtained from: chart, RN, sister Harpal Galloway: patient cannot state/participate d/t medical condition    HPI/SUBJECTIVE:    The patient is:   [] Verbal and participatory  [x] Non-participatory due to: medical condition, unresponsive     Clinical Pain Assessment (nonverbal scale for severity on nonverbal patients): Activity (Movement): Lying quietly, normal position    Duration: for how long has pt been experiencing pain (e.g., 2 days, 1 month, years)  Frequency: how often pain is an issue (e.g., several times per day, once every few days, constant)     FUNCTIONAL ASSESSMENT:     Palliative Performance Scale (PPS):  PPS: 20       PSYCHOSOCIAL/SPIRITUAL SCREENING:     Palliative IDT has assessed this patient for cultural preferences / practices and a referral made as appropriate to needs (Cultural Services, Patient Advocacy, Ethics, etc.)    Any spiritual / Adventist concerns:  [] Yes /  [x] No   If \"Yes\" to discuss with pastoral care during IDT     Does caregiver feel burdened by caring for their loved one:   [] Yes /  [] No /  [] No Caregiver Present/Available [] No Caregiver [x] Pt Lives at Facility  If \"Yes\" to discuss with social work during IDT    Anticipatory grief assessment:   [x] Normal  / [] Maladaptive     If \"Maladaptive\" to discuss with social work during IDT    ESAS Anxiety: Anxiety:  (patient cannot state/participate d/t medical condition)    ESAS Depression:          REVIEW OF SYSTEMS:     Positive and pertinent negative findings in ROS are noted above in HPI. The following systems were [x] reviewed / [] unable to be reviewed as noted in HPI  Other findings are noted below.   Systems: constitutional, ears/nose/mouth/throat, respiratory, gastrointestinal, genitourinary, musculoskeletal, integumentary, neurologic, psychiatric, endocrine. Positive findings noted below. Modified ESAS Completed by: provider   Fatigue:  (patient cannot state/participate d/t medical condition)       Pain:  (patient cannot state/participate d/t medical condition)   Anxiety:  (patient cannot state/participate d/t medical condition)       Dyspnea:  (patient cannot state/participate d/t medical condition)                    PHYSICAL EXAM:     From RN flowsheet:  Wt Readings from Last 3 Encounters:   12/21/22 180 lb (81.6 kg)   09/28/22 180 lb (81.6 kg)   09/09/22 177 lb 7.5 oz (80.5 kg)     Blood pressure 138/88, pulse 72, temperature 100 °F (37.8 °C), resp. rate 19, weight 180 lb (81.6 kg), SpO2 99 %.     Pain Scale 1: Behavioral Pain Scale (BPS)  Pain Intensity 1: 3                 Last bowel movement, if known:     General: lying in hospital bed with eyes closed, not awake, intubated, no sedation at this time  Eyes: lids normal, no drainage  Nose: nares normal, no drainage  Mouth: mmm, no drainage  Pulm: rate is normal, respirations are unlabored  Neuro: does not wake or stir to exam  Psych: calm without restlessness or agitation at this time  Skin: hands are cold to touch  MSK: patient is in restraints       HISTORY:     Active Problems:    Status epilepticus ([address] (12/21/2022)    Past Medical History:   Diagnosis Date    Acid reflux     Alcohol abuse     Arthropathy, unspecified, site unspecified     Atrial fibrillation ([address]     Calcaneus fracture     Condyloma     Depression     Essential hypertension     H/O mitral valve repair 2006    Along with MAZE procedure    High cholesterol     History of angina     Hypertension     IHSS (idiopathic hypertrophic subaortic stenosis) (HCC)     Ill-defined condition     pacemaker    Impotence of organic origin     LA thrombus 05/2015    LA and ARNULFO thrombus    Pacemaker 2006    Thyroid disease     Trichilemmal cyst 4/10/2017    Unspecified cerebral artery occlusion with cerebral infarction       Past Surgical History:   Procedure Laterality Date    HX APPENDECTOMY      HX CYST REMOVAL      HX HEART CATHETERIZATION      HX OTHER SURGICAL  2005    HX OTHER SURGICAL  2017    excision of scalp mass    HX PACEMAKER  2006    [place] CARDIAC SURG PROCEDURE UNLIST  2006    Mitral Valve Replacement      No family history on file. History reviewed, no pertinent family history. Social History     Tobacco Use    Smoking status: Former     Types: Cigarettes     Quit date: 2020     Years since quittin.3    Smokeless tobacco: Never   Substance Use Topics    Alcohol use:  Yes     Alcohol/week: 0.0 standard drinks     Types: 1 - 2 Cans of beer per week     Comment: occ     Allergies   Allergen Reactions    Bactrim [Sulfamethoxazole-Trimethoprim] Other (comments)     GI Distress      Current Facility-Administered Medications   Medication Dose Route Frequency    alcohol 62% (NOZIN) nasal  1 Ampule  1 Ampule Topical Q12H    balsam peru-castor oiL (VENELEX) ointment   Topical BID    chlorhexidine (ORAL CARE KIT) 0.12 % mouthwash 15 mL  15 mL Oral Q12H    ampicillin-sulbactam (UNASYN) 3 g in 0.9% sodium chloride (MBP/ADV) 100 mL MBP  3 g IntraVENous Q6H    propofol (DIPRIVAN) 10 mg/mL infusion  0-50 mcg/kg/min IntraVENous TITRATE    sodium chloride (NS) flush 5-40 mL  5-40 mL IntraVENous Q8H    sodium chloride (NS) flush 5-40 mL  5-40 mL IntraVENous PRN    acetaminophen (TYLENOL) tablet 650 mg  650 mg Oral Q6H PRN    Or    acetaminophen (TYLENOL) suppository 650 mg  650 mg Rectal Q6H PRN    polyethylene glycol (MIRALAX) packet 17 g  17 g Oral DAILY PRN    ondansetron (ZOFRAN ODT) tablet 4 mg  4 mg Oral Q8H PRN    Or    ondansetron (ZOFRAN) injection 4 mg  4 mg IntraVENous Q6H PRN    enoxaparin (LOVENOX) injection 40 mg  40 mg SubCUTAneous DAILY    levETIRAcetam (KEPPRA) injection 1,000 mg  1,000 mg IntraVENous Q12H    divalproex (DEPAKOTE SPRINKLE) capsule 125 mg  125 mg Oral Q12H    lactulose (CHRONULAC) 10 gram/15 mL solution 30 mL  20 g Oral TID    albuterol (PROVENTIL VENTOLIN) nebulizer solution 2.5 mg  2.5 mg Nebulization Q6H RT    insulin lispro (HUMALOG) injection   SubCUTAneous Q6H    glucose chewable tablet 16 g  4 Tablet Oral PRN    glucagon (GLUCAGEN) injection 1 mg  1 mg IntraMUSCular PRN    dextrose 10% infusion 0-250 mL  0-250 mL IntraVENous PRN    metoprolol tartrate (LOPRESSOR) tablet 12.5 mg  12.5 mg Oral Q8H    famotidine (PF) (PEPCID) 20 mg in 0.9% sodium chloride 10 mL injection  20 mg IntraVENous Q12H    levothyroxine (SYNTHROID) tablet 50 mcg  50 mcg Oral 6am          LAB AND IMAGING FINDINGS:     Lab Results   Component Value Date/Time    WBC 8.1 12/22/2022 04:24 AM    HGB 13.2 12/22/2022 04:24 AM    PLATELET 449 (L) 27/32/3380 04:24 AM     Lab Results   Component Value Date/Time    Sodium 142 12/22/2022 04:24 AM    Potassium 3.2 (L) 12/22/2022 04:24 AM    Chloride 111 (H) 12/22/2022 04:24 AM    CO2 21 12/22/2022 04:24 AM    BUN 16 12/22/2022 04:24 AM    Creatinine 1.38 (H) 12/22/2022 04:24 AM    Calcium 9.9 12/22/2022 04:24 AM    Magnesium 2.3 12/21/2022 01:46 PM    Phosphorus 7.0 (H) 12/21/2022 01:46 PM      Lab Results   Component Value Date/Time    Alk.  phosphatase 162 (H) 12/21/2022 01:46 PM    Protein, total 9.0 (H) 12/21/2022 01:46 PM    Albumin 4.0 12/21/2022 01:46 PM    Globulin 5.0 (H) 12/21/2022 01:46 PM     Lab Results   Component Value Date/Time    INR 2.1 (H) 09/05/2022 04:14 AM    Prothrombin time 21.3 (H) 09/05/2022 04:14 AM    aPTT 26.8 12/22/2022 01:16 AM    aPTT 34.1 06/25/2016 04:35 AM      Lab Results   Component Value Date/Time    Iron 63 08/13/2021 09:35 AM    TIBC 492 (H) 08/13/2021 09:35 AM    Iron % saturation 13 (L) 08/13/2021 09:35 AM    Ferritin 63 08/13/2021 09:35 AM      Lab Results   Component Value Date/Time    pH 7.46 (H) 12/21/2022 03:09 PM    PCO2 27 (L) 12/21/2022 03:09 PM    PO2 71 (L) 12/21/2022 03:09 PM     No components found for: [place]   Lab Results   Component Value Date/Time     (H) 09/05/2022 12:10 AM    CK - MB 1.7 12/16/2015 10:50 PM                Total time: 50 mins  Counseling / coordination time, spent as noted above: 35 mins  > 50% counseling / coordination?: yes    Prolonged service was provided for  []30 min   []75 min in face to face time in the presence of the patient, spent as noted above. Time Start:   Time End:   Note: this can only be billed with 11406 (initial) or 71487 (follow up). If multiple start / stop times, list each separately.

## 2022-12-22 NOTE — PROGRESS NOTES
Reason for Admission:   Status Epilepticus                    RUR Score:  16%                PCP: First and Last name:   Ivan Ugarte MD     Name of Practice:    Are you a current patient: Yes/No:    Approximate date of last visit: Unsure   Can you participate in a virtual visit if needed:     Do you (patient/family) have any concerns for transition/discharge? Being followed by palliative team                Plan for utilizing home health:  LTC     Current Advanced Directive/Advance Care Plan:  DNR  CM confirmed patient is a DNR    Healthcare Decision Maker:   Click here to complete [address] including selection of the Healthcare Decision Maker Relationship (ie \"Primary\")            Primary Decision Maker: Mag Scherer Sister - [phone]    Secondary Decision Maker: Surinder Wagner - Other Relative - [phone]    Supplemental (Other) Decision Maker: Stephanie Truong (niece) - Other Relative - [phone]    Transition of Care Plan:        Patient is a LTC resident at [facility] and [address] [place] [address]. Patient is being followed by palliative team in the CCU at this time.   Current Dispo: LTC vs Hospice

## 2022-12-23 LAB
AMMONIA PLAS-SCNC: 12 UMOL/L
AMMONIA PLAS-SCNC: 12 UMOL/L
ANION GAP SERPL CALC-SCNC: 12 MMOL/L (ref 5–15)
ATRIAL RATE: 79 BPM
ATRIAL RATE: 80 BPM
BUN SERPL-MCNC: 16 MG/DL (ref 6–20)
BUN/CREAT SERPL: 10 (ref 12–20)
CALCIUM SERPL-MCNC: 9.8 MG/DL (ref 8.5–10.1)
CALCULATED R AXIS, ECG10: -21 DEGREES
CALCULATED R AXIS, ECG10: -67 DEGREES
CALCULATED T AXIS, ECG11: 152 DEGREES
CALCULATED T AXIS, ECG11: 97 DEGREES
CHLORIDE SERPL-SCNC: 111 MMOL/L (ref 97–108)
CO2 SERPL-SCNC: 21 MMOL/L (ref 21–32)
CREAT SERPL-MCNC: 1.55 MG/DL (ref 0.7–1.3)
DIAGNOSIS, 93000: NORMAL
DIAGNOSIS, 93000: NORMAL
ERYTHROCYTE [DISTWIDTH] IN BLOOD BY AUTOMATED COUNT: 15.1 % (ref 11.5–14.5)
GLUCOSE BLD STRIP.AUTO-MCNC: 100 MG/DL (ref 65–117)
GLUCOSE BLD STRIP.AUTO-MCNC: 112 MG/DL (ref 65–117)
GLUCOSE BLD STRIP.AUTO-MCNC: 116 MG/DL (ref 65–117)
GLUCOSE BLD STRIP.AUTO-MCNC: 119 MG/DL (ref 65–117)
GLUCOSE BLD STRIP.AUTO-MCNC: 141 MG/DL (ref 65–117)
GLUCOSE SERPL-MCNC: 126 MG/DL (ref 65–100)
HCT VFR BLD AUTO: 43.8 % (ref 36.6–50.3)
HGB BLD-MCNC: 14 G/DL (ref 12.1–17)
MAGNESIUM SERPL-MCNC: 1.8 MG/DL (ref 1.6–2.4)
MCH RBC QN AUTO: 23 PG (ref 26–34)
MCHC RBC AUTO-ENTMCNC: 32 G/DL (ref 30–36.5)
MCV RBC AUTO: 72 FL (ref 80–99)
NRBC # BLD: 0 K/UL (ref 0–0.01)
NRBC BLD-RTO: 0 PER 100 WBC
PHOSPHATE SERPL-MCNC: 2.3 MG/DL (ref 2.6–4.7)
PLATELET # BLD AUTO: 106 K/UL (ref 150–400)
PMV BLD AUTO: ABNORMAL FL (ref 8.9–12.9)
POTASSIUM SERPL-SCNC: 3.4 MMOL/L (ref 3.5–5.1)
Q-T INTERVAL, ECG07: 340 MS
Q-T INTERVAL, ECG07: 554 MS
QRS DURATION, ECG06: 114 MS
QRS DURATION, ECG06: 190 MS
QTC CALCULATION (BEZET), ECG08: 389 MS
QTC CALCULATION (BEZET), ECG08: 598 MS
RBC # BLD AUTO: 6.08 M/UL (ref 4.1–5.7)
SERVICE CMNT-IMP: ABNORMAL
SERVICE CMNT-IMP: ABNORMAL
SERVICE CMNT-IMP: NORMAL
SODIUM SERPL-SCNC: 144 MMOL/L (ref 136–145)
VENTRICULAR RATE, ECG03: 70 BPM
VENTRICULAR RATE, ECG03: 79 BPM
WBC # BLD AUTO: 8.1 K/UL (ref 4.1–11.1)

## 2022-12-23 PROCEDURE — 74011250636 HC RX REV CODE- 250/636: Performed by: NURSE PRACTITIONER

## 2022-12-23 PROCEDURE — 74011000258 HC RX REV CODE- 258: Performed by: HOSPITALIST

## 2022-12-23 PROCEDURE — 74011250637 HC RX REV CODE- 250/637: Performed by: NURSE PRACTITIONER

## 2022-12-23 PROCEDURE — 80048 BASIC METABOLIC PNL TOTAL CA: CPT

## 2022-12-23 PROCEDURE — 36415 COLL VENOUS BLD VENIPUNCTURE: CPT

## 2022-12-23 PROCEDURE — 74011250636 HC RX REV CODE- 250/636: Performed by: HOSPITALIST

## 2022-12-23 PROCEDURE — 94640 AIRWAY INHALATION TREATMENT: CPT

## 2022-12-23 PROCEDURE — 74011000250 HC RX REV CODE- 250: Performed by: INTERNAL MEDICINE

## 2022-12-23 PROCEDURE — 84100 ASSAY OF PHOSPHORUS: CPT

## 2022-12-23 PROCEDURE — 99233 SBSQ HOSP IP/OBS HIGH 50: CPT | Performed by: INTERNAL MEDICINE

## 2022-12-23 PROCEDURE — 94003 VENT MGMT INPAT SUBQ DAY: CPT

## 2022-12-23 PROCEDURE — 83735 ASSAY OF MAGNESIUM: CPT

## 2022-12-23 PROCEDURE — 74011250636 HC RX REV CODE- 250/636: Performed by: INTERNAL MEDICINE

## 2022-12-23 PROCEDURE — 82962 GLUCOSE BLOOD TEST: CPT

## 2022-12-23 PROCEDURE — 82140 ASSAY OF AMMONIA: CPT

## 2022-12-23 PROCEDURE — 65610000006 HC RM INTENSIVE CARE

## 2022-12-23 PROCEDURE — 74011250637 HC RX REV CODE- 250/637: Performed by: INTERNAL MEDICINE

## 2022-12-23 PROCEDURE — 85027 COMPLETE CBC AUTOMATED: CPT

## 2022-12-23 PROCEDURE — 99232 SBSQ HOSP IP/OBS MODERATE 35: CPT | Performed by: FAMILY MEDICINE

## 2022-12-23 PROCEDURE — 95816 EEG AWAKE AND DROWSY: CPT | Performed by: INTERNAL MEDICINE

## 2022-12-23 RX ORDER — ENOXAPARIN SODIUM 100 MG/ML
1 INJECTION SUBCUTANEOUS EVERY 12 HOURS
Status: DISCONTINUED | OUTPATIENT
Start: 2022-12-23 | End: 2022-12-28 | Stop reason: HOSPADM

## 2022-12-23 RX ORDER — MAGNESIUM SULFATE 1 G/100ML
1 INJECTION INTRAVENOUS ONCE
Status: COMPLETED | OUTPATIENT
Start: 2022-12-23 | End: 2022-12-23

## 2022-12-23 RX ADMIN — LEVETIRACETAM 1000 MG: 100 INJECTION, SOLUTION, CONCENTRATE INTRAVENOUS at 12:36

## 2022-12-23 RX ADMIN — SODIUM CHLORIDE, PRESERVATIVE FREE 10 ML: 5 INJECTION INTRAVENOUS at 13:29

## 2022-12-23 RX ADMIN — AMPICILLIN SODIUM AND SULBACTAM SODIUM 3 G: 2; 1 INJECTION, POWDER, FOR SOLUTION INTRAMUSCULAR; INTRAVENOUS at 11:27

## 2022-12-23 RX ADMIN — ALBUTEROL SULFATE 2.5 MG: 2.5 SOLUTION RESPIRATORY (INHALATION) at 20:17

## 2022-12-23 RX ADMIN — LEVOTHYROXINE SODIUM 50 MCG: 0.05 TABLET ORAL at 05:23

## 2022-12-23 RX ADMIN — AMPICILLIN SODIUM AND SULBACTAM SODIUM 3 G: 2; 1 INJECTION, POWDER, FOR SOLUTION INTRAMUSCULAR; INTRAVENOUS at 00:07

## 2022-12-23 RX ADMIN — Medication 1 AMPULE: at 08:08

## 2022-12-23 RX ADMIN — LACTULOSE 30 ML: 20 SOLUTION ORAL at 21:43

## 2022-12-23 RX ADMIN — ALBUTEROL SULFATE 2.5 MG: 2.5 SOLUTION RESPIRATORY (INHALATION) at 08:33

## 2022-12-23 RX ADMIN — MIDAZOLAM 1 MG: 1 INJECTION INTRAMUSCULAR; INTRAVENOUS at 03:22

## 2022-12-23 RX ADMIN — CASTOR OIL AND BALSAM, PERU: 788; 87 OINTMENT TOPICAL at 08:07

## 2022-12-23 RX ADMIN — SODIUM CHLORIDE, PRESERVATIVE FREE 20 MG: 5 INJECTION INTRAVENOUS at 08:19

## 2022-12-23 RX ADMIN — METOPROLOL TARTRATE 12.5 MG: 25 TABLET, FILM COATED ORAL at 05:23

## 2022-12-23 RX ADMIN — PIPERACILLIN AND TAZOBACTAM 3.38 G: 3; .375 INJECTION, POWDER, FOR SOLUTION INTRAVENOUS at 13:24

## 2022-12-23 RX ADMIN — SODIUM CHLORIDE, PRESERVATIVE FREE 30 ML: 5 INJECTION INTRAVENOUS at 05:27

## 2022-12-23 RX ADMIN — MIDAZOLAM 1 MG: 1 INJECTION INTRAMUSCULAR; INTRAVENOUS at 00:22

## 2022-12-23 RX ADMIN — POTASSIUM BICARBONATE 40 MEQ: 782 TABLET, EFFERVESCENT ORAL at 04:18

## 2022-12-23 RX ADMIN — CHLORHEXIDINE GLUCONATE 15 ML: 1.2 RINSE ORAL at 20:14

## 2022-12-23 RX ADMIN — Medication 1 AMPULE: at 20:14

## 2022-12-23 RX ADMIN — ALBUTEROL SULFATE 2.5 MG: 2.5 SOLUTION RESPIRATORY (INHALATION) at 14:09

## 2022-12-23 RX ADMIN — ENOXAPARIN SODIUM 70 MG: 100 INJECTION SUBCUTANEOUS at 20:22

## 2022-12-23 RX ADMIN — ALBUTEROL SULFATE 2.5 MG: 2.5 SOLUTION RESPIRATORY (INHALATION) at 02:22

## 2022-12-23 RX ADMIN — DIVALPROEX SODIUM 125 MG: 125 CAPSULE ORAL at 08:20

## 2022-12-23 RX ADMIN — LACTULOSE 30 ML: 20 SOLUTION ORAL at 16:41

## 2022-12-23 RX ADMIN — PIPERACILLIN AND TAZOBACTAM 3.38 G: 3; .375 INJECTION, POWDER, FOR SOLUTION INTRAVENOUS at 20:30

## 2022-12-23 RX ADMIN — ENOXAPARIN SODIUM 40 MG: 100 INJECTION SUBCUTANEOUS at 08:19

## 2022-12-23 RX ADMIN — CASTOR OIL AND BALSAM, PERU: 788; 87 OINTMENT TOPICAL at 20:14

## 2022-12-23 RX ADMIN — CHLORHEXIDINE GLUCONATE 15 ML: 1.2 RINSE ORAL at 08:07

## 2022-12-23 RX ADMIN — LACTULOSE 30 ML: 20 SOLUTION ORAL at 08:19

## 2022-12-23 RX ADMIN — LEVETIRACETAM 1000 MG: 100 INJECTION, SOLUTION, CONCENTRATE INTRAVENOUS at 01:11

## 2022-12-23 RX ADMIN — SODIUM CHLORIDE, PRESERVATIVE FREE 20 ML: 5 INJECTION INTRAVENOUS at 21:43

## 2022-12-23 RX ADMIN — SODIUM CHLORIDE, PRESERVATIVE FREE 20 MG: 5 INJECTION INTRAVENOUS at 20:20

## 2022-12-23 RX ADMIN — AMPICILLIN SODIUM AND SULBACTAM SODIUM 3 G: 2; 1 INJECTION, POWDER, FOR SOLUTION INTRAMUSCULAR; INTRAVENOUS at 05:30

## 2022-12-23 RX ADMIN — METOPROLOL TARTRATE 12.5 MG: 25 TABLET, FILM COATED ORAL at 13:22

## 2022-12-23 RX ADMIN — MAGNESIUM SULFATE HEPTAHYDRATE 1 G: 1 INJECTION, SOLUTION INTRAVENOUS at 12:27

## 2022-12-23 RX ADMIN — METOPROLOL TARTRATE 12.5 MG: 25 TABLET, FILM COATED ORAL at 21:43

## 2022-12-23 NOTE — PROGRESS NOTES
Critical Care Progress Note  Eugenie Keith MD          Date of Service:  2022  NAME:  Elsie Barreto  :  1965  MRN:  [id number]      Subjective/Hospital course:      : Patient is weaned off of sedation this morning, no more seizure activity seen but remains unresponsive. : Patient remains off of pressors, minimally responsive. Assessment/Plan:     PULMONARY:  Hypercapnic respiratory failure  Secondary to versed for seizure and encephalopathy. HAP. Plan  - MRSA negative. - sputum cx and Ucx growing GNR.  -Stop vanc and change unasyn to zosyn. - Vent settings established, reviewed and/or adjusted as per orders  - bronchodilators. - Daily SBT when meets criteria     CARDIOVASCULAR/HEMODYNAMIC:  Afib / flutter   CHFrEF        Current vasopressors: none      Plan  - hold most home antihypertensive for now due to relative hypotension  - keep beta blocker but at low dose   - MAP goal > 65 mmHg  - Was on xarelto at home, will continue therapeutic lovenox for now and may change to eliquis later. Has CKD. RENAL:  Ckd - III        Plan  - Monitor I/Os and Uo  - Monitor renal function panel intermittently  - Correct electrolytes as needed  - Avoid nephrotoxic meds        GI/NUTRITION:  Etoh cirrhosis         Plan  - continue home lactulose. -Recheck ammonia, if still high then add rifaximine. INFECTIOUS DISEASE  ? Pneumonia     Plan  - continue abx   - follow up cx      NEURO  Metabolic encephalopathy  Seizure disorder     Plan  - keppra load in ER  - continue keppra BID  - Dced depakote due to high ammonia and also per neurology he was not taking recently. - neurology following.  - EEG today. Obtain MRI.  - Daily SAT when meets ICU criteria  - ABCDEF mobility bundle  - PT/OT involvement when appropriate        ENDO  Hyperglycemia  hypothyroid     Plan  - SSI  - A1c 5.8.  - restart home synthroid  - send TSH           GOALS OF CARE/ADVANCED DIRECTIVES  DNR. DVT prophylaxis: lovenox  SUP: pepcid     Care Plan discussed with: Patient/Family and Nurse    I discussed the plan with patient's sister on the phone. Patient was on hospice before but they insurance company took him off because of his functional status. She wants to talk to hospice care. Palliative care following. Already DNR. She wants to continue current care for now. I personally spent 40 minutes of critical care time. This is time spent at this critically ill patient's bedside actively involved in patient care as well as the coordination of care and discussions with the patient's family. This does not include any procedural time which has been billed separately. Review of Systems:   Review of Systems   Unable to perform ROS: Medical condition        Vital Signs:   Patient Vitals for the past 4 hrs:   BP Temp Pulse Resp SpO2   12/23/22 1138 (!) 148/96 97.8 °F (36.6 °C) 70 21 100 %   12/23/22 1125 -- -- 71 24 100 %   12/23/22 1123 -- -- 75 23 100 %   12/23/22 1100 110/72 -- 70 22 --   12/23/22 1000 124/76 -- 70 22 100 %   12/23/22 0900 113/69 -- 70 22 100 %   12/23/22 0833 -- -- -- -- 100 %          Intake/Output Summary (Last 24 hours) at 12/23/2022 1207  Last data filed at 12/23/2022 7029  Gross per 24 hour   Intake 857.27 ml   Output 965 ml   Net -107.73 ml          Physical Examination:    Physical Exam  Constitutional:       Comments: Intubated, unresponsive. HENT:      Head: Normocephalic and atraumatic. Mouth/Throat:      Mouth: Mucous membranes are moist.   Eyes:      Conjunctiva/sclera: Conjunctivae normal.   Cardiovascular:      Rate and Rhythm: Normal rate and regular rhythm. Pulmonary:      Effort: No respiratory distress. Breath sounds: No wheezing. Comments: On mechanical ventilation  Abdominal:      General: Abdomen is flat. There is no distension. Palpations: Abdomen is soft. Tenderness: There is no abdominal tenderness. There is no guarding. Musculoskeletal:      Cervical back: Neck supple. Neurological:      Comments: Unresponsive       Labs and Imaging:   Reviewed.       Medications:     Current Facility-Administered Medications   Medication Dose Route Frequency    magnesium sulfate 1 g/100 ml IVPB (premix or compounded)  1 g IntraVENous ONCE    alcohol 62% (NOZIN) nasal  1 Ampule  1 Ampule Topical Q12H    balsam peru-castor oiL (VENELEX) ointment   Topical BID    chlorhexidine (ORAL CARE KIT) 0.12 % mouthwash 15 mL  15 mL Oral Q12H    ampicillin-sulbactam (UNASYN) 3 g in 0.9% sodium chloride (MBP/ADV) 100 mL MBP  3 g IntraVENous Q6H    lactulose (CHRONULAC) 10 gram/15 mL solution 30 mL  20 g Per NG tube TID    levothyroxine (SYNTHROID) tablet 50 mcg  50 mcg Per NG tube 6am    metoprolol tartrate (LOPRESSOR) tablet 12.5 mg  12.5 mg Per NG tube Q8H    acetaminophen (TYLENOL) solution 650 mg  650 mg Per NG tube Q6H PRN    Or    acetaminophen (TYLENOL) suppository 650 mg  650 mg Rectal Q6H PRN    ondansetron (ZOFRAN ODT) tablet 4 mg  4 mg Per NG tube Q8H PRN    Or    ondansetron (ZOFRAN) injection 4 mg  4 mg IntraVENous Q6H PRN    polyethylene glycol (MIRALAX) packet 17 g  17 g Per NG tube DAILY PRN    midazolam (VERSED) injection 1 mg  1 mg IntraVENous Q2H PRN    sodium chloride (NS) flush 5-40 mL  5-40 mL IntraVENous Q8H    sodium chloride (NS) flush 5-40 mL  5-40 mL IntraVENous PRN    enoxaparin (LOVENOX) injection 40 mg  40 mg SubCUTAneous DAILY    levETIRAcetam (KEPPRA) injection 1,000 mg  1,000 mg IntraVENous Q12H    albuterol (PROVENTIL VENTOLIN) nebulizer solution 2.5 mg  2.5 mg Nebulization Q6H RT    insulin lispro (HUMALOG) injection   SubCUTAneous Q6H    glucose chewable tablet 16 g  4 Tablet Oral PRN    glucagon (GLUCAGEN) injection 1 mg  1 mg IntraMUSCular PRN    dextrose 10% infusion 0-250 mL  0-250 mL IntraVENous PRN    famotidine (PF) (PEPCID) 20 mg in 0.9% sodium chloride 10 mL injection  20 mg IntraVENous Q12H ______________________________________________________________________  Rekha Mo LENGTH OF STAY: 4d 9h  ACTUAL LENGTH OF STAY:          Dayday Cortez, MD   Pulmonary/CCM  [address]  [phone]  12/23/2022

## 2022-12-23 NOTE — PROGRESS NOTES
Date of Consultation:  December 23, 2022      Chief Complaint   Patient presents with    Respiratory Distress    Seizure       History of Present Illness:   Rosa Trujillo is a 59 y.o. male with history of alcohol use disorder with alcoholic liver cirrhosis and elevated LFTs, prior history of multiple strokes including right frontal lobe and left MCA territory, atrial fibrillation on Xarelto, hypertension, hyperlipidemia, s/p pacemaker, as well as alcohol withdrawal seizures and medication noncompliance, on Keppra at home 750 mg twice daily. The patient presents with multiple seizures concerning for status epilepticus, s/p 4.5 mg of IV Keppra. Intubated to protect airway and transferred to ICU. Interval events: The patient has been off sedation since yesterday morning (12/22 9 AM). Per nurse, the patient is withdrawing to pain in all stimuli. Appears to have all cranial nerves intact. However he is not following any commands. He does have aphasia at baseline. Per chart review, it appears that the patient had had a history of noncompliance with all of his medications and significant depression.     Past Medical History:   Diagnosis Date    Acid reflux     Alcohol abuse     Arthropathy, unspecified, site unspecified     Atrial fibrillation ([address]     Calcaneus fracture     Condyloma     Depression     Essential hypertension     H/O mitral valve repair 2006    Along with MAZE procedure    High cholesterol     History of angina     Hypertension     IHSS (idiopathic hypertrophic subaortic stenosis) (HCC)     Ill-defined condition     pacemaker    Impotence of organic origin     LA thrombus 05/2015    LA and ARNULFO thrombus    Pacemaker 2006    Thyroid disease     Trichilemmal cyst 4/10/2017    Unspecified cerebral artery occlusion with cerebral infarction         Past Surgical History:   Procedure Laterality Date    HX APPENDECTOMY      HX CYST REMOVAL      HX HEART CATHETERIZATION      HX OTHER SURGICAL  4/2005 HX OTHER SURGICAL  2017    excision of scalp mass    HX PACEMAKER  2006    [place] CARDIAC SURG PROCEDURE UNLIST  2006    Mitral Valve Replacement        No family history on file. Social History     Tobacco Use    Smoking status: Former     Types: Cigarettes     Quit date: 2020     Years since quittin.3    Smokeless tobacco: Never   Substance Use Topics    Alcohol use: Yes     Alcohol/week: 0.0 standard drinks     Types: 1 - 2 Cans of beer per week     Comment: occ        Allergies   Allergen Reactions    Bactrim [Sulfamethoxazole-Trimethoprim] Other (comments)     GI Distress        Prior to Admission medications    Medication Sig Start Date End Date Taking? Authorizing Provider   LORazepam (LORazepam IntensoL) 2 mg/mL concentrated solution Take 0.5 mg by mouth every six (6) hours. Yes Provider, Historical   LORazepam (LORazepam IntensoL) 2 mg/mL concentrated solution Take 0.5 mg by mouth every six (6) hours as needed for Anxiety. Yes Provider, Historical   morphine sulfate (MORPHINE CONCENTRATE 20 MG/ML ORAL SOLUTION, NDG ONLY,) Take 5 mg by mouth every four (4) hours as needed for Pain. Yes Provider, Historical   furosemide (LASIX) 40 mg tablet Take 40 mg by mouth daily. Yes Provider, Historical   levETIRAcetam (KEPPRA) 750 mg tablet Take 750 mg by mouth daily. Yes Provider, Historical   FLUoxetine (PROzac) 40 mg capsule Take 40 mg by mouth nightly. Yes Provider, Historical   traMADoL (ULTRAM) 50 mg tablet Take 50 mg by mouth every six (6) hours as needed for Pain. Provider, Historical   acetaminophen (TYLENOL) 500 mg tablet Take  by mouth every eight (8) hours as needed for Pain.     Provider, Historical       Review of Systems:    General, constitutional: negative  Eyes, vision: negative  Ears, nose, throat: negative  Cardiovascular, heart: negative  Respiratory: negative  Gastrointestinal: negative  Genitourinary: negative  Musculoskeletal: negative  Skin and integumentary: negative  Psychiatric: negative  Endocrine: negative  Neurological: negative, except for HPI  Hematologic/lymphatic: negative  Allergy/immunology: negative    []Unable to obtain  ROS due to  []mental status change  []sedated   []intubated      PHYSICAL EXAMINATION:   Visit Vitals  /69   Pulse 70   Temp 99.6 °F (37.6 °C)   Resp 22   Wt 155 lb 13.8 oz (70.7 kg)   SpO2 100%   BMI 23.02 kg/m²     Neurological examination  Intubated off sedation for 24 hours  Neck: no carotid bruits  Lungs: On mechanical ventilation  Heart:  no murmurs, regular rate  Lower extremity: no edema     Language: Does not follow any commands, opens eyes to sternal rub. Tracks over to me with eyes however does not maintain eye contact. Cranial nerves:   Pupils are equal and reactive to light bilaterally, accommodation intact  Has cough and gag  VOR intact, corneals present bilaterally. Face appears symmetric        Motor: Tone normal  No spontaneous limb movement        Sensory:  Withdraws to painful stim in all extremities     Reflexes:                          Right    Left  Biceps             2          2  Triceps             2          2  Brachiorad.       2          2  Patella             2          2  Achilles            1 bilaterally   Plantar response:  flexor bilaterally        Cerebellar testing:  no abnormal movements        Gait: unable to assess due to cognitive status      Data:     Lab Results   Component Value Date/Time    Hemoglobin A1c 5.8 (H) 12/21/2022 01:46 PM    Sodium 144 12/23/2022 03:19 AM    Potassium 3.4 (L) 12/23/2022 03:19 AM    Chloride 111 (H) 12/23/2022 03:19 AM    Glucose 126 (H) 12/23/2022 03:19 AM    BUN 16 12/23/2022 03:19 AM    Creatinine 1.55 (H) 12/23/2022 03:19 AM    Calcium 9.8 12/23/2022 03:19 AM    WBC 8.1 12/23/2022 03:19 AM    HCT 43.8 12/23/2022 03:19 AM    HGB 14.0 12/23/2022 03:19 AM    PLATELET 950 (L) 57/00/7807 03:19 AM       Imaging:    No results found for this or any previous visit. Results from [place] encounter on 12/21/22    CT HEAD WO CONT    Narrative  EXAM: CT HEAD WO CONT    INDICATION: seizure    COMPARISON: December 2, 2022. CONTRAST: None. TECHNIQUE: Unenhanced CT of the head was performed using 5 mm images. Brain and  bone windows were generated. Coronal and sagittal reformats. CT dose reduction  was achieved through use of a standardized protocol tailored for this  examination and automatic exposure control for dose modulation. FINDINGS:  Remote infarct in the vascular distribution of the left middle cerebral artery,  remote right frontal infarct. No extra-axial fluid collection, hemorrhage or  shift. Impression  No acute changes. IMPRESSION/RECOMMENDATIONS:  Kimberly Brown is a 59 y.o. male who presents with hypercapnic respiratory failure secondary to convulsive status epilepticus with known history of seizure disorder in the setting of medication noncompliance and alcohol use disorder, transferred to the ICU s/p intubation and sedation. Patient was loaded with Keppra and increased from home dose of 750 mg twice daily to 1000 mg twice daily. Head CT shows chronic right frontal stroke and left MCA territory stroke. No new findings. Routine EEG showed no seizures, there is epileptiform discharges in the area of the right frontal stroke as well as slowing seen in the left hemisphere. Ammonia level 165. Keppra level was not checked. Depakote level undetectable. Patient is now off sedation however continues to not follow commands.     Recommendations:  -We will obtain routine EEG due to persistent altered mental status  - Recommend brain MRI with and without contrast when patient is stable  - Continue 1000 mg twice daily of Keppra  - May discontinue Depakote as the patient was not getting this medication while in the nursing home per pharmacy and may also worsen encephalopathy and LFTs which are already elevated; Depakote level not detectable  - Seizure precautions  - Administer 5 mg IV Ativan for seizure lasting more than 5 minutes or IM Versed          Thank you very much for this consultation. Neurology will continue to follow. I spent 55 minutes providing critical care to this acutely ill inpatient with > 50% of the time counseling and assisting in the coordination of care of the patient on the patient's hospital floor/unit.      Claudetta Goodie, DO

## 2022-12-23 NOTE — PROGRESS NOTES
Palliative Medicine      Code Status: DNR      Advance Care Planning:  Advance Care Planning 12/22/2022   Patient's Healthcare Decision Maker is: -in [place]   Primary Decision Maker Name -Zeb Delacruz   Confirm Advance Directive Yes, on file   Patient Would Like to Complete Advance Directive -unable     Patient / Family Encounter Documentation    Participants (names): Kenna Lin, Dr. Bueno Comment, Ranjana [place], LCSW, ICU nurse Marce Neumann, nurse, siblings Elder Reaper, [address]    Narrative: Palliative team checked in on patient, who was opening his eyes and nurse reported he was tracking, but not following commands. He was in no apparent distress. Palliative team left voicemail for [address] offering phone conference instead of in person meeting due to inclement weather. No return call was received. This writer consulted with bedside nurse, [place], who reported family was enroute. Dr. Josefa Sanchez shared that patient was reported to be on hospice at [facility] and it is unclear what family goals are for care. [place] reported that MRI could not be done today because cardiologist office is closed and will need to be contacted regarding patient's pacemaker. ALEXX met family outside ICU (Curtis Lugo, and Bruce Elmore, who had just driven from [facility]) and escorted them to his room where Dr. Olaf Keenan joined in meeting with them to update them on patient's condition and provide support and education. [address] confirmed that patient was previously on hospice care and was discharge about a week ago because \"he was up and walking around. \"  She did not know the name of the hospice company. Palliative team educated family on team's role in providing support while patient is hospitalized and they expressed understanding. They did not have any questions. Patient opened his eyes and smiled at this siblings and started to laugh when she said something funny.       Psychosocial Issues Identified/ Resilience Factors:   Patient has been a \"slow motion person\" all of his life, and prior to this hospitalization he was walking, feeding himself and visiting his family. The last time he and Killian Guzman were together in person was on Thanksgiving and she reports that they would talk on the phone every other day since he moved to [address] [place] [address] in 2019. Prior to that he lived with his sister Killian Guzman and her family from 2012 after having a stroke. She had told him he could no longer stay with her because he wasn't taking his medications. She and her family take him out to eat and to visit with them in their home. Recently she reported he was suffering \"from a lot of depression\" due to missing his family, his son, daughter, and grandchildren. He has lost contact with them and Killian Guzman has not been able to reach them either. She thinks he may have been refusing his medications due to feeling depressed. She thought he was getting an anti-depressant at [facility]. He expresses frustration that he can't talk because of his stroke. For communication patient points and has communication cards. (He is still able to curse a few words when really frustrated.)      Goals of Care / Plan:   Patient will have symptoms managed. Palliative team will meet with patient's family to discuss [address] and provide education and support as needed. mPOA will have support in following his wishes. Palliative team will continue to provide education and support to patient and family as appropriate. Thank you for including Palliative Medicine in the care of Mr. Sacha Agustin.     Jesus Winter, [facility]  [phone] ([phone])

## 2022-12-23 NOTE — PROGRESS NOTES
1900 Bedside shift change report given to Erin Lozano RN (oncoming nurse) by Gregory Jason RN (offgoing nurse). Report included the following information SBAR, Kardex, ED Summary, Procedure Summary, Intake/Output, MAR, Recent Results, and Cardiac Rhythm V-paced . 1930 Eyes open to voice and open larger with repeated command but no other command following. Pupils sluggishly react to light bilaterally. Patient tracks my voice within current field of vision but does not turn head to track my voice if I switch sides of the bed. Temp 101.1 axillary. Withdraws all extremities to pain. Restraints in place and tolerating vent settings well at this time. OG tube clamped at this time. 1945 PRN tylenol administered. 0015 Reassessment completed, no significant changes. 0022 PRN versed administered for agitation. 5817 PRN versed administered for agitation. 0400 Reassessment completed, no significant changes. 18 Spoke with Alonzo Harris NP regarding labs. Orders received. Also discussed trending elevated BP, notify NP if SBP >170.    0705 Bedside shift change report given to LORENZA Brar and LORENZA Pugh (oncoming nurse) by Erin Lozano RN (offgoing nurse). Report included the following information SBAR, Kardex, ED Summary, Procedure Summary, Intake/Output, MAR, Recent Results, and Cardiac Rhythm V-paced .

## 2022-12-23 NOTE — PROGRESS NOTES
Bedside and Verbal shift change report received from ZELDA White RN (offgoing nurse). Report included the following information SBAR, Kardex, ED Summary, Procedure Summary, Intake/Output, MAR, Accordion, Recent Results, Med Rec Status, Cardiac Rhythm VPaced, Alarm Parameters , and Quality Measures. No sedation, but is is tolerating the current mechanical ventilator settings well. Lots of oral and ET secretions.

## 2022-12-23 NOTE — PROGRESS NOTES
Palliative Medicine Consult  Beny: [phone])    Patient Name: Carol Rodgers  YOB: 1965    Date of Initial Consult: 12/22/22  Reason for Consult: Care Decisions  Requesting Provider: Dr. Paulino Ahn   Primary Care Physician: Prerna Milner MD     SUMMARY:   Carol Rodgers is a 59 y.o. with Chronic nonischemic cardiomyopathy LVEF 15%, atrial flutter, MVR, dual chamber pacemaker, CVA with residual R-sided weakness and aphasia and EtOH cirrhosis. He was last admitted in 9/2022 with seizure thought secondary to noncompliance. He was admitted on 12/21/2022 from [facility] with a diagnosis of acute hypercapnic respiratory failure d/t status epilepticus. He is intubated, Sedation is to be weaned this morning. He is receiving antibiotics for possible right sided pneumonia, and there was concern for vomit in his airway prior to intubation. Current medical issues leading to Palliative Medicine involvement include: Care Decisions. Social History and Recent Events:  Per sister and Nick Shah: He had a CVA in 2012 and lived with Esdras Elias after that until around 2019 went he went to a facility after experiencing a seizure. He has mostly been in facilities since this time. Esdras Elias notes that her brother has a history \"of not taking his medications like he is supposed to. \" He has difficulty with speech at baseline d/t his history of stroke, though he can say a few words and communicate with gesturing. She says he is slow but active at baseline. He is able to walk and feed himself. He likes to watch tv. He visits with his sister Esdras Elias sometimes, and she last saw him on Thanksgiving. She talks on the phone with her brother about every other day. She says he has depression in part because he is not able to interact with his family as much as he would like since he needs to live in a facility. She thinks this may have contributed to his not taking his medications as prescribed.      PALLIATIVE DIAGNOSES: Encounter for Palliative Care  Goals of Care discussion  Unresponsive/Impaired cognitive ability  Expressive aphasia at baseline  Physical Debility/Generalized weakness  Restlessness--has required restraints     PLAN:   We visited patient this morning. He was intubated and off sedation. We spoke with ICU RN regarding patient status. Patient has had his eyes open and will attempt to track and focus on visitor at times. He is not able to follow command. He has a lot of secretions per RN. We called patient's sister Gunnar Ortiz to discuss her brother's status. We have a planned meeting at 2 pm, but with the weather she may wish to talk by phone instead. We will try to reach out to her again today. We met with Pablo this afternoon around 4pm. She was here with her sister Alfredo Cheney. We reviewed patient's status and updates for today. Discussed he has been alert but not able to follow command. Discussed that he will remain intubated today until he meets all criteria to try spontaneous breathing trial. They are hopeful he may be able to breathe well enough on his own to have the breathing tube removed. We inquired about patient's hospice status as Pharm med rec revealed medications that may be associated with hospice care. Gunnar Ortiz says he was on hospice until he was discharged recently. She says he was discharged because \"they said he couldn't be on hospice if he was active, and he walks. \"  We let Gunnar Ortiz know our team would return on Tuesday and would be following along for support. Thank you for asking our team to participate in the care of [facility].       GOALS OF CARE / TREATMENT PREFERENCES:     GOALS OF CARE:       TREATMENT PREFERENCES:   Code Status: DNR    Patient and family's personal goals include: Gunnar Ortiz plans to visit her brother Friday    Important upcoming milestones or family events: Patient's other sister is coming from [place] to visit patient tomorrow    The patient identifies the following as important for living well: patient cannot state/participate d/t medical condition      Advance Care Planning:  [] The [facility] Interdisciplinary Team has updated the ACP Navigator with Health Care Decision Maker and Patient Capacity      Primary Decision Maker: Cat Roman Sister - [phone]    Secondary Decision Maker: Surinder Wagner - Other Relative - [phone]    Supplemental (Other) Decision Maker: Gualberto Grimaldo (niece) - Other Relative - [id number] Planning 12/22/2022   Patient's Healthcare Decision Maker is: -   Primary Decision Maker Name -   Confirm Advance Directive Yes, on file   Patient Would Like to Complete Advance Directive -               Other:    As far as possible, the palliative care team has discussed with patient / health care proxy about goals of care / treatment preferences for patient. HISTORY:     History obtained from: chart, RN, sister Nahed Rosario: patient cannot state/participate d/t medical condition    HPI/SUBJECTIVE:    The patient is:   [] Verbal and participatory  [x] Non-participatory due to: medical condition, unresponsive     Clinical Pain Assessment (nonverbal scale for severity on nonverbal patients):          Activity (Movement): Lying quietly, normal position    Duration: for how long has pt been experiencing pain (e.g., 2 days, 1 month, years)  Frequency: how often pain is an issue (e.g., several times per day, once every few days, constant)     FUNCTIONAL ASSESSMENT:     Palliative Performance Scale (PPS):  PPS: 30       PSYCHOSOCIAL/SPIRITUAL SCREENING:     Palliative IDT has assessed this patient for cultural preferences / practices and a referral made as appropriate to needs (Cultural Services, Patient Advocacy, Ethics, etc.)    Any spiritual / Gnosticist concerns:  [] Yes /  [x] No   If \"Yes\" to discuss with pastoral care during IDT     Does caregiver feel burdened by caring for their loved one:   [] Yes /  [] No /  [] No Caregiver Present/Available [] No Caregiver [x] Pt Lives at Facility  If \"Yes\" to discuss with social work during IDT    Anticipatory grief assessment:   [x] Normal  / [] Maladaptive     If \"Maladaptive\" to discuss with social work during IDT    ESAS Anxiety: Anxiety:  (patient cannot state/participate d/t medical condition)    ESAS Depression:          REVIEW OF SYSTEMS:     Positive and pertinent negative findings in ROS are noted above in HPI. The following systems were [x] reviewed / [] unable to be reviewed as noted in HPI  Other findings are noted below. Systems: constitutional, ears/nose/mouth/throat, respiratory, gastrointestinal, genitourinary, musculoskeletal, integumentary, neurologic, psychiatric, endocrine. Positive findings noted below. Modified ESAS Completed by: provider   Fatigue:  (patient cannot state/participate d/t medical condition)       Pain:  (patient cannot state/participate d/t medical condition)   Anxiety:  (patient cannot state/participate d/t medical condition)       Dyspnea:  (patient cannot state/participate d/t medical condition)                    PHYSICAL EXAM:     From RN flowsheet:  Wt Readings from Last 3 Encounters:   12/23/22 155 lb 13.8 oz (70.7 kg)   09/28/22 180 lb (81.6 kg)   09/09/22 177 lb 7.5 oz (80.5 kg)     Blood pressure 124/76, pulse 70, temperature 99.6 °F (37.6 °C), resp. rate 22, weight 155 lb 13.8 oz (70.7 kg), SpO2 100 %.     Pain Scale 1: Behavioral Pain Scale (BPS)  Pain Intensity 1: 3                 Last bowel movement, if known:     General: lying in hospital bed with eyes closed, intubated, no sedation at this time  Eyes: lids normal, no drainage  Nose: nares normal, no drainage  Mouth: mmm, no drainage  Pulm: respirations are unlabored on vent  Neuro: opens eyes to name call, does not follow command  Psych: calm without restlessness or agitation at this time  Skin: hands are cool to touch  MSK: patient is in restraints       HISTORY:     Active Problems: Status epilepticus ([facility] [address] (2022)    Past Medical History:   Diagnosis Date    Acid reflux     Alcohol abuse     Arthropathy, unspecified, site unspecified     Atrial fibrillation ([facility] [address]     Calcaneus fracture     Condyloma     Depression     Essential hypertension     H/O mitral valve repair     Along with MAZE procedure    High cholesterol     History of angina     Hypertension     IHSS (idiopathic hypertrophic subaortic stenosis) ([facility])     Ill-defined condition     pacemaker    Impotence of organic origin     LA thrombus 2015    LA and ARNULFO thrombus    Pacemaker     Thyroid disease     Trichilemmal cyst 4/10/2017    Unspecified cerebral artery occlusion with cerebral infarction       Past Surgical History:   Procedure Laterality Date    HX APPENDECTOMY      HX CYST REMOVAL      HX HEART CATHETERIZATION      HX OTHER SURGICAL  2005    HX OTHER SURGICAL  2017    excision of scalp mass    HX PACEMAKER  2006    UT CARDIAC SURG PROCEDURE UNLIST  2006    Mitral Valve Replacement      No family history on file. History reviewed, no pertinent family history. Social History     Tobacco Use    Smoking status: Former     Types: Cigarettes     Quit date: 2020     Years since quittin.3    Smokeless tobacco: Never   Substance Use Topics    Alcohol use:  Yes     Alcohol/week: 0.0 standard drinks     Types: 1 - 2 Cans of beer per week     Comment: occ     Allergies   Allergen Reactions    Bactrim [Sulfamethoxazole-Trimethoprim] Other (comments)     GI Distress      Current Facility-Administered Medications   Medication Dose Route Frequency    magnesium sulfate 1 g/100 ml IVPB (premix or compounded)  1 g IntraVENous ONCE    alcohol 62% (NOZIN) nasal  1 Ampule  1 Ampule Topical Q12H    balsam peru-castor oiL (VENELEX) ointment   Topical BID    chlorhexidine (ORAL CARE KIT) 0.12 % mouthwash 15 mL  15 mL Oral Q12H    ampicillin-sulbactam (UNASYN) 3 g in 0.9% sodium chloride (MBP/ADV) 100 mL MBP  3 g IntraVENous Q6H    lactulose (CHRONULAC) 10 gram/15 mL solution 30 mL  20 g Per NG tube TID    levothyroxine (SYNTHROID) tablet 50 mcg  50 mcg Per NG tube 6am    metoprolol tartrate (LOPRESSOR) tablet 12.5 mg  12.5 mg Per NG tube Q8H    acetaminophen (TYLENOL) solution 650 mg  650 mg Per NG tube Q6H PRN    Or    acetaminophen (TYLENOL) suppository 650 mg  650 mg Rectal Q6H PRN    ondansetron (ZOFRAN ODT) tablet 4 mg  4 mg Per NG tube Q8H PRN    Or    ondansetron (ZOFRAN) injection 4 mg  4 mg IntraVENous Q6H PRN    polyethylene glycol (MIRALAX) packet 17 g  17 g Per NG tube DAILY PRN    midazolam (VERSED) injection 1 mg  1 mg IntraVENous Q2H PRN    sodium chloride (NS) flush 5-40 mL  5-40 mL IntraVENous Q8H    sodium chloride (NS) flush 5-40 mL  5-40 mL IntraVENous PRN    enoxaparin (LOVENOX) injection 40 mg  40 mg SubCUTAneous DAILY    levETIRAcetam (KEPPRA) injection 1,000 mg  1,000 mg IntraVENous Q12H    albuterol (PROVENTIL VENTOLIN) nebulizer solution 2.5 mg  2.5 mg Nebulization Q6H RT    insulin lispro (HUMALOG) injection   SubCUTAneous Q6H    glucose chewable tablet 16 g  4 Tablet Oral PRN    glucagon (GLUCAGEN) injection 1 mg  1 mg IntraMUSCular PRN    dextrose 10% infusion 0-250 mL  0-250 mL IntraVENous PRN    famotidine (PF) (PEPCID) 20 mg in 0.9% sodium chloride 10 mL injection  20 mg IntraVENous Q12H          LAB AND IMAGING FINDINGS:     Lab Results   Component Value Date/Time    WBC 8.1 12/23/2022 03:19 AM    HGB 14.0 12/23/2022 03:19 AM    PLATELET 769 (L) 59/63/6999 03:19 AM     Lab Results   Component Value Date/Time    Sodium 144 12/23/2022 03:19 AM    Potassium 3.4 (L) 12/23/2022 03:19 AM    Chloride 111 (H) 12/23/2022 03:19 AM    CO2 21 12/23/2022 03:19 AM    BUN 16 12/23/2022 03:19 AM    Creatinine 1.55 (H) 12/23/2022 03:19 AM    Calcium 9.8 12/23/2022 03:19 AM    Magnesium 1.8 12/23/2022 03:19 AM    Phosphorus 2.3 (L) 12/23/2022 03:19 AM      Lab Results   Component Value Date/Time    Alk. phosphatase 162 (H) 12/21/2022 01:46 PM    Protein, total 9.0 (H) 12/21/2022 01:46 PM    Albumin 4.0 12/21/2022 01:46 PM    Globulin 5.0 (H) 12/21/2022 01:46 PM     Lab Results   Component Value Date/Time    INR 2.1 (H) 09/05/2022 04:14 AM    Prothrombin time 21.3 (H) 09/05/2022 04:14 AM    aPTT 26.8 12/22/2022 01:16 AM    aPTT 34.1 06/25/2016 04:35 AM      Lab Results   Component Value Date/Time    Iron 63 08/13/2021 09:35 AM    TIBC 492 (H) 08/13/2021 09:35 AM    Iron % saturation 13 (L) 08/13/2021 09:35 AM    Ferritin 63 08/13/2021 09:35 AM      Lab Results   Component Value Date/Time    pH 7.46 (H) 12/21/2022 03:09 PM    PCO2 27 (L) 12/21/2022 03:09 PM    PO2 71 (L) 12/21/2022 03:09 PM     No components found for: [place]   Lab Results   Component Value Date/Time     (H) 09/05/2022 12:10 AM    CK - MB 1.7 12/16/2015 10:50 PM                Total time: 25 mins  Counseling / coordination time, spent as noted above: 20 mins  > 50% counseling / coordination?: yes    Prolonged service was provided for  []30 min   []75 min in face to face time in the presence of the patient, spent as noted above. Time Start:   Time End:   Note: this can only be billed with 32966 (initial) or 03188 (follow up). If multiple start / stop times, list each separately.

## 2022-12-23 NOTE — PROCEDURES
EEG REPORT    Patient Name: Juanis Gant  : 1965  Age: 59 y.o. Ordering physician: No ref. provider found  Date of EEG start time: 22 at 10:27 AM   Date of EEG end time: 22 at 11:09 AM   EEG procedure number: [id number]  Diagnosis: altered mental status, status epilepticus   Interpreting physician: Richelle Angel. Juanita Chery. FAAN    Procedure: EEG    CLINICAL INDICATION: The patient is a 59 y.o. male who is being evaluated for baseline electro cerebral activities and to rule out seizure focus.       Current Facility-Administered Medications   Medication Dose Route Frequency    magnesium sulfate 1 g/100 ml IVPB (premix or compounded)  1 g IntraVENous ONCE    alcohol 62% (NOZIN) nasal  1 Ampule  1 Ampule Topical Q12H    balsam peru-castor oiL (VENELEX) ointment   Topical BID    chlorhexidine (ORAL CARE KIT) 0.12 % mouthwash 15 mL  15 mL Oral Q12H    ampicillin-sulbactam (UNASYN) 3 g in 0.9% sodium chloride (MBP/ADV) 100 mL MBP  3 g IntraVENous Q6H    lactulose (CHRONULAC) 10 gram/15 mL solution 30 mL  20 g Per NG tube TID    levothyroxine (SYNTHROID) tablet 50 mcg  50 mcg Per NG tube 6am    metoprolol tartrate (LOPRESSOR) tablet 12.5 mg  12.5 mg Per NG tube Q8H    acetaminophen (TYLENOL) solution 650 mg  650 mg Per NG tube Q6H PRN    Or    acetaminophen (TYLENOL) suppository 650 mg  650 mg Rectal Q6H PRN    ondansetron (ZOFRAN ODT) tablet 4 mg  4 mg Per NG tube Q8H PRN    Or    ondansetron (ZOFRAN) injection 4 mg  4 mg IntraVENous Q6H PRN    polyethylene glycol (MIRALAX) packet 17 g  17 g Per NG tube DAILY PRN    midazolam (VERSED) injection 1 mg  1 mg IntraVENous Q2H PRN    sodium chloride (NS) flush 5-40 mL  5-40 mL IntraVENous Q8H    sodium chloride (NS) flush 5-40 mL  5-40 mL IntraVENous PRN    enoxaparin (LOVENOX) injection 40 mg  40 mg SubCUTAneous DAILY    levETIRAcetam (KEPPRA) injection 1,000 mg  1,000 mg IntraVENous Q12H    albuterol (PROVENTIL VENTOLIN) nebulizer solution 2.5 mg 2.5 mg Nebulization Q6H RT    insulin lispro (HUMALOG) injection   SubCUTAneous Q6H    glucose chewable tablet 16 g  4 Tablet Oral PRN    glucagon (GLUCAGEN) injection 1 mg  1 mg IntraMUSCular PRN    dextrose 10% infusion 0-250 mL  0-250 mL IntraVENous PRN    famotidine (PF) (PEPCID) 20 mg in 0.9% sodium chloride 10 mL injection  20 mg IntraVENous Q12H           DESCRIPTION OF PROCEDURE:   This is a digitally recorded electroencephalogram.  Electrodes were applied in accordance with the international 10-20 system of electrode placement. 18 channels of scalp EEG are recorded. A channel was used for EoG. Another channel was used for ECG. The data is stored digitally and reviewed in reformatted montages for optimal display. EEG was reviewed in both bipolar and referential montages. Description of Activity:  At the onset of recording, there is a notable asymmetry with more than 50% difference in amplitude in the hemispheres with the left hemisphere showing attenuated amplitudes compared to the right hemisphere. There is also an asymmetry in frequency where we see continuous slowing in the left hemisphere with comparison to the right hemisphere. In the right hemisphere we see frequencies in the delta to theta range with no clear posterior dominant rhythm. In the left hemisphere, we see frequencies in the delta range. The anterior to posterior gradient is poor. There is variability, continuity, reactivity with state changes. Throughout the recording, there are occasional 1 Hz blunted medium amplitude discharges with maximal negativity at Fp2 > F4 which suggest focal cortical irritability in the right frontal head region. These are less obvious compared with the prior study. Toward the end of the study, there is evidence of generalized 1 Hz discharges with blunted triphasic morphology that are occasional in nature, and more noticeable in the right hemisphere.      Hyperventilation was not performed. Photic stimulation produced no response in the posterior head regions. During the recording, we do see evidence of better formed sleep architecture in the right hemisphere evident by sleep spindles and vertex sharp waves. These are not as well-formed in the left hemisphere. There is no evidence of electrographic seizures or events concerning for clinical seizures. Single-channel EKG is regular in rate and rhythm. Clinical Interpretation: This EEG is abnormal for the following reasons:  - Profound asymmetry with slowing and attenuated amplitudes in the left hemisphere which is suggestive of a focal structural deficit, consistent with the patient's known left MCA stroke. - Occasional blunted discharges maximal in the right frontal lobe suggestive of cortical irritability and potential epileptogenicity which is consistent with the patient's known right frontal stroke. This area does have a high risk for seizures. These are less obvious and concerning compared with prior study.  - Occasional presence of blunted triphasic discharges more present and noticeable on the right which can be seen in the setting of toxic metabolic encephalopathy, most classically and hepatic or renal dysfunction.  - Moderate diffuse slowing with poor organization which is suggestive of global cerebral dysfunction which can be seen in a toxic-metabolic encephalopathies of nonspecific etiology. Overall with comparison to the prior EEG, there is improvement in the epileptogenic discharges however there is more evidence of a mild toxic metabolic encephalopathy. There were no electrographic seizures or clinical events of concern. If further suspicion persists, would recommend obtaining a continuous EEG study. Clinical correlation recommended. Lexy Garcia.

## 2022-12-23 NOTE — PROGRESS NOTES
Patient has abandoned leads from prior pacemaker device. A pacemaker with abandoned leads cannot be safely scanned.

## 2022-12-23 NOTE — PROGRESS NOTES
0700 Bedside report. Patient looks stable. Resting quietly in bed.   0710 Patient is turned for a 2 RN skin check assessment. Has had a BM. Cleaned and turned to right side. 8039 Patient suctioned from Endotracheal and with Yankauer, lots of secretions. PO meds administered via OG tube. 1020 EEG initiated  1130 Patient turned to right side. 1330 Patient turned to left side. 1420 Patient had a BM, kanika-care performed, linens changed. 1440 Patient back on vent. 1600 Family visiting at bedside  1700 Patient turned left side. Meds given. Patient suctioned orally with yankauer and via ET tube. 0 MRI called to cancel scheduled procedure because patient has a banded pacemaker. 2861 Patient bathed and turned. Patient had a third BM.

## 2022-12-24 ENCOUNTER — APPOINTMENT (OUTPATIENT)
Dept: GENERAL RADIOLOGY | Age: 57
DRG: 100 | End: 2022-12-24
Attending: NURSE PRACTITIONER
Payer: MEDICARE

## 2022-12-24 LAB
ANION GAP SERPL CALC-SCNC: 10 MMOL/L (ref 5–15)
ANION GAP SERPL CALC-SCNC: 6 MMOL/L (ref 5–15)
BACTERIA SPEC CULT: ABNORMAL
BACTERIA SPEC CULT: ABNORMAL
BUN SERPL-MCNC: 16 MG/DL (ref 6–20)
BUN SERPL-MCNC: 22 MG/DL (ref 6–20)
BUN/CREAT SERPL: 12 (ref 12–20)
BUN/CREAT SERPL: 17 (ref 12–20)
CALCIUM SERPL-MCNC: 9.2 MG/DL (ref 8.5–10.1)
CALCIUM SERPL-MCNC: 9.3 MG/DL (ref 8.5–10.1)
CC UR VC: ABNORMAL
CHLORIDE SERPL-SCNC: 113 MMOL/L (ref 97–108)
CHLORIDE SERPL-SCNC: 114 MMOL/L (ref 97–108)
CO2 SERPL-SCNC: 22 MMOL/L (ref 21–32)
CO2 SERPL-SCNC: 25 MMOL/L (ref 21–32)
CREAT SERPL-MCNC: 1.27 MG/DL (ref 0.7–1.3)
CREAT SERPL-MCNC: 1.35 MG/DL (ref 0.7–1.3)
ERYTHROCYTE [DISTWIDTH] IN BLOOD BY AUTOMATED COUNT: 15 % (ref 11.5–14.5)
GLUCOSE BLD STRIP.AUTO-MCNC: 102 MG/DL (ref 65–117)
GLUCOSE BLD STRIP.AUTO-MCNC: 87 MG/DL (ref 65–117)
GLUCOSE BLD STRIP.AUTO-MCNC: 97 MG/DL (ref 65–117)
GLUCOSE SERPL-MCNC: 113 MG/DL (ref 65–100)
GLUCOSE SERPL-MCNC: 116 MG/DL (ref 65–100)
GRAM STN SPEC: ABNORMAL
HCT VFR BLD AUTO: 39 % (ref 36.6–50.3)
HGB BLD-MCNC: 12.5 G/DL (ref 12.1–17)
MAGNESIUM SERPL-MCNC: 1.9 MG/DL (ref 1.6–2.4)
MAGNESIUM SERPL-MCNC: 2.1 MG/DL (ref 1.6–2.4)
MCH RBC QN AUTO: 23.1 PG (ref 26–34)
MCHC RBC AUTO-ENTMCNC: 32.1 G/DL (ref 30–36.5)
MCV RBC AUTO: 72.1 FL (ref 80–99)
NRBC # BLD: 0 K/UL (ref 0–0.01)
NRBC BLD-RTO: 0 PER 100 WBC
PHOSPHATE SERPL-MCNC: 2.6 MG/DL (ref 2.6–4.7)
PHOSPHATE SERPL-MCNC: 2.7 MG/DL (ref 2.6–4.7)
PLATELET # BLD AUTO: 110 K/UL (ref 150–400)
PMV BLD AUTO: ABNORMAL FL (ref 8.9–12.9)
POTASSIUM SERPL-SCNC: 3.6 MMOL/L (ref 3.5–5.1)
POTASSIUM SERPL-SCNC: 3.6 MMOL/L (ref 3.5–5.1)
RBC # BLD AUTO: 5.41 M/UL (ref 4.1–5.7)
SERVICE CMNT-IMP: ABNORMAL
SERVICE CMNT-IMP: ABNORMAL
SERVICE CMNT-IMP: NORMAL
SODIUM SERPL-SCNC: 144 MMOL/L (ref 136–145)
SODIUM SERPL-SCNC: 146 MMOL/L (ref 136–145)
WBC # BLD AUTO: 9 K/UL (ref 4.1–11.1)

## 2022-12-24 PROCEDURE — 94640 AIRWAY INHALATION TREATMENT: CPT

## 2022-12-24 PROCEDURE — 94003 VENT MGMT INPAT SUBQ DAY: CPT

## 2022-12-24 PROCEDURE — 74011250636 HC RX REV CODE- 250/636: Performed by: HOSPITALIST

## 2022-12-24 PROCEDURE — 74011000250 HC RX REV CODE- 250: Performed by: INTERNAL MEDICINE

## 2022-12-24 PROCEDURE — 82962 GLUCOSE BLOOD TEST: CPT

## 2022-12-24 PROCEDURE — 83735 ASSAY OF MAGNESIUM: CPT

## 2022-12-24 PROCEDURE — 99291 CRITICAL CARE FIRST HOUR: CPT | Performed by: PSYCHIATRY & NEUROLOGY

## 2022-12-24 PROCEDURE — 65610000006 HC RM INTENSIVE CARE

## 2022-12-24 PROCEDURE — 85027 COMPLETE CBC AUTOMATED: CPT

## 2022-12-24 PROCEDURE — 74011000258 HC RX REV CODE- 258: Performed by: HOSPITALIST

## 2022-12-24 PROCEDURE — 74011000258 HC RX REV CODE- 258: Performed by: INTERNAL MEDICINE

## 2022-12-24 PROCEDURE — 80048 BASIC METABOLIC PNL TOTAL CA: CPT

## 2022-12-24 PROCEDURE — 36415 COLL VENOUS BLD VENIPUNCTURE: CPT

## 2022-12-24 PROCEDURE — 74011250637 HC RX REV CODE- 250/637: Performed by: INTERNAL MEDICINE

## 2022-12-24 PROCEDURE — 84100 ASSAY OF PHOSPHORUS: CPT

## 2022-12-24 PROCEDURE — 74018 RADEX ABDOMEN 1 VIEW: CPT

## 2022-12-24 PROCEDURE — 74011250636 HC RX REV CODE- 250/636: Performed by: INTERNAL MEDICINE

## 2022-12-24 PROCEDURE — 74011250637 HC RX REV CODE- 250/637: Performed by: NURSE PRACTITIONER

## 2022-12-24 PROCEDURE — 87040 BLOOD CULTURE FOR BACTERIA: CPT

## 2022-12-24 RX ORDER — ALBUTEROL SULFATE 0.83 MG/ML
2.5 SOLUTION RESPIRATORY (INHALATION)
Status: DISCONTINUED | OUTPATIENT
Start: 2022-12-24 | End: 2022-12-28 | Stop reason: HOSPADM

## 2022-12-24 RX ADMIN — CHLORHEXIDINE GLUCONATE 15 ML: 1.2 RINSE ORAL at 20:27

## 2022-12-24 RX ADMIN — SODIUM CHLORIDE, PRESERVATIVE FREE 20 MG: 5 INJECTION INTRAVENOUS at 20:20

## 2022-12-24 RX ADMIN — ALBUTEROL SULFATE 2.5 MG: 2.5 SOLUTION RESPIRATORY (INHALATION) at 01:51

## 2022-12-24 RX ADMIN — METOPROLOL TARTRATE 12.5 MG: 25 TABLET, FILM COATED ORAL at 05:26

## 2022-12-24 RX ADMIN — LACTULOSE 30 ML: 20 SOLUTION ORAL at 08:29

## 2022-12-24 RX ADMIN — CHLORHEXIDINE GLUCONATE 15 ML: 1.2 RINSE ORAL at 08:25

## 2022-12-24 RX ADMIN — CASTOR OIL AND BALSAM, PERU: 788; 87 OINTMENT TOPICAL at 08:32

## 2022-12-24 RX ADMIN — Medication 1 AMPULE: at 08:25

## 2022-12-24 RX ADMIN — SODIUM CHLORIDE, PRESERVATIVE FREE 10 ML: 5 INJECTION INTRAVENOUS at 21:42

## 2022-12-24 RX ADMIN — LACTULOSE 30 ML: 20 SOLUTION ORAL at 21:14

## 2022-12-24 RX ADMIN — ENOXAPARIN SODIUM 70 MG: 100 INJECTION SUBCUTANEOUS at 20:20

## 2022-12-24 RX ADMIN — LEVETIRACETAM 1000 MG: 100 INJECTION, SOLUTION, CONCENTRATE INTRAVENOUS at 13:49

## 2022-12-24 RX ADMIN — Medication 1 AMPULE: at 20:19

## 2022-12-24 RX ADMIN — ALBUTEROL SULFATE 2.5 MG: 2.5 SOLUTION RESPIRATORY (INHALATION) at 08:04

## 2022-12-24 RX ADMIN — SODIUM CHLORIDE, PRESERVATIVE FREE 10 ML: 5 INJECTION INTRAVENOUS at 13:50

## 2022-12-24 RX ADMIN — SODIUM CHLORIDE 1 G: 9 INJECTION, SOLUTION INTRAMUSCULAR; INTRAVENOUS; SUBCUTANEOUS at 12:49

## 2022-12-24 RX ADMIN — PIPERACILLIN AND TAZOBACTAM 3.38 G: 3; .375 INJECTION, POWDER, FOR SOLUTION INTRAVENOUS at 05:10

## 2022-12-24 RX ADMIN — CASTOR OIL AND BALSAM, PERU: 788; 87 OINTMENT TOPICAL at 20:22

## 2022-12-24 RX ADMIN — METOPROLOL TARTRATE 12.5 MG: 25 TABLET, FILM COATED ORAL at 21:14

## 2022-12-24 RX ADMIN — MEROPENEM 1 G: 1 INJECTION, POWDER, FOR SOLUTION INTRAVENOUS at 20:19

## 2022-12-24 RX ADMIN — LEVETIRACETAM 1000 MG: 100 INJECTION, SOLUTION, CONCENTRATE INTRAVENOUS at 01:29

## 2022-12-24 RX ADMIN — METOPROLOL TARTRATE 12.5 MG: 25 TABLET, FILM COATED ORAL at 13:50

## 2022-12-24 RX ADMIN — LEVOTHYROXINE SODIUM 50 MCG: 0.05 TABLET ORAL at 05:26

## 2022-12-24 RX ADMIN — ACETAMINOPHEN 650 MG: 650 SOLUTION ORAL at 21:14

## 2022-12-24 RX ADMIN — ENOXAPARIN SODIUM 70 MG: 100 INJECTION SUBCUTANEOUS at 08:29

## 2022-12-24 RX ADMIN — LACTULOSE 30 ML: 20 SOLUTION ORAL at 17:43

## 2022-12-24 RX ADMIN — SODIUM CHLORIDE, PRESERVATIVE FREE 20 MG: 5 INJECTION INTRAVENOUS at 08:28

## 2022-12-24 RX ADMIN — SODIUM CHLORIDE, PRESERVATIVE FREE 20 ML: 5 INJECTION INTRAVENOUS at 05:29

## 2022-12-24 NOTE — PROGRESS NOTES
1900 Bedside shift change report given to LORENZA Westbrook (oncoming nurse) by Fan Meza and [place], RN (offgoing nurse). Report included the following information SBAR, Kardex, ED Summary, Procedure Summary, Intake/Output, MAR, Recent Results, and Cardiac Rhythm V-paced . 2000 Shift assessment completed, see flowsheets for details. Eyes opening to voice, no command following at this time. L arm moving purposefully. Restraints in place. 0000 Reassessment completed, no significant changes. 0300 Large bowel movement noted. Patient bathed at this time. 0400 Reassessment completed, no significant changes. 2964 Bedside shift change report given to Deion Eason RN (oncoming nurse) by LORENZA Westbrook (offgoing nurse). Report included the following information SBAR, Kardex, ED Summary, Procedure Summary, Intake/Output, MAR, Recent Results, and Cardiac Rhythm v-paced .

## 2022-12-24 NOTE — PROGRESS NOTES
0700  Report from [facility]    0800  Assessment complete  Patient is awake alert does not follow commands but does track and opens eyes spontaneously able to move left UE and both LE right is flaccid. Restrains in place. ETT on vent a/c 30% 520 15 peep 5 with copious amounts of oral secretions. OGT in place clamped. Aviles in place with little output parul in color. 2 PIV in place on left #18 left FA with KVO and #20 endurance in left upper arm flushed and capped. 0900  Placed on spontaneous pressure support 10/5 30% doing well    1000  Pressure support to 8/10 30%     1100  BM cleaned    1125  Lab called ESBL in sputum placed on contact Dr Abiel Mccauley speaking with family could extubate today will wait and try tomorrow  after new ABX started and due to lots of oral secretions. 1200  Assessment complete no changes    1600  Assessment complete no changes    1900  Report to [facility].  RN

## 2022-12-24 NOTE — PROGRESS NOTES
Critical Care Progress Note  Kulwant Addison MD          Date of Service:  2022  NAME:  Erika Jovel  :  1965  MRN:  [id number]      Subjective/Hospital course:      : Patient is weaned off of sedation this morning, no more seizure activity seen but remains unresponsive. : Patient remains off of pressors, minimally responsive. : awake, follows simple commands     Assessment/Plan:     PULMONARY:  Hypercapnic respiratory failure  Secondary to versed for seizure and encephalopathy. HAP. Plan  - MRSA negative. - sputum cx and Ucx growing GNR.  -Stop vanc and change unasyn to zosyn. - Vent settings established, reviewed and/or adjusted as per orders  - bronchodilators. - Daily SBT when meets criteria     CARDIOVASCULAR/HEMODYNAMIC:  Afib / flutter   CHFrEF        Current vasopressors: none      Plan  - hold most home antihypertensive for now due to relative hypotension  - keep beta blocker but at low dose   - MAP goal > 65 mmHg  - Was on xarelto at home, will continue therapeutic lovenox for now and may change to eliquis later. Has CKD. RENAL:  Ckd - III        Plan  - Monitor I/Os and Uo  - Monitor renal function panel intermittently  - Correct electrolytes as needed  - Avoid nephrotoxic meds        GI/NUTRITION:  Etoh cirrhosis         Plan  - continue home lactulose. -Recheck ammonia, if still high then add rifaximine. INFECTIOUS DISEASE  ? Pneumonia     Plan  - continue abx   - follow up cx      NEURO  Metabolic encephalopathy  Seizure disorder     Plan  - keppra load in ER  - continue keppra BID  - Dced depakote due to high ammonia and also per neurology he was not taking recently. - neurology following.  - EEG today.  Obtain MRI.  - Daily SAT when meets ICU criteria  - ABCDEF mobility bundle  - PT/OT involvement when appropriate        ENDO  Hyperglycemia  hypothyroid     Plan  - SSI  - A1c 5.8.  - restart home synthroid  - send TSH GOALS OF CARE/ADVANCED DIRECTIVES  DNR. DVT prophylaxis: lovenox  SUP: pepcid     Care Plan discussed with: Patient/Family and Nurse    I discussed the plan with patient's sister on the phone. Patient was on hospice before but they insurance company took him off because of his functional status. She wants to talk to hospice care. Palliative care following. Already DNR. She wants to continue current care for now. I personally spent 40 minutes of critical care time. This is time spent at this critically ill patient's bedside actively involved in patient care as well as the coordination of care and discussions with the patient's family. This does not include any procedural time which has been billed separately. Review of Systems:   Review of Systems   Unable to perform ROS: Medical condition        Vital Signs:   Patient Vitals for the past 4 hrs:   BP Temp Pulse Resp SpO2   12/24/22 0804 -- -- 70 20 99 %   12/24/22 0800 123/76 99.4 °F (37.4 °C) 70 15 99 %   12/24/22 0700 130/84 -- 70 17 99 %          Intake/Output Summary (Last 24 hours) at 12/24/2022 1015  Last data filed at 12/24/2022 0800  Gross per 24 hour   Intake 755 ml   Output 511 ml   Net 244 ml          Physical Examination:    Physical Exam  Constitutional:       Comments: Intubated, unresponsive. HENT:      Head: Normocephalic and atraumatic. Mouth/Throat:      Mouth: Mucous membranes are moist.   Eyes:      Conjunctiva/sclera: Conjunctivae normal.   Cardiovascular:      Rate and Rhythm: Normal rate and regular rhythm. Pulmonary:      Effort: No respiratory distress. Breath sounds: No wheezing. Comments: On mechanical ventilation  Abdominal:      General: Abdomen is flat. There is no distension. Palpations: Abdomen is soft. Tenderness: There is no abdominal tenderness. There is no guarding. Musculoskeletal:      Cervical back: Neck supple.    Neurological:      Comments: Unresponsive       Labs and Imaging: Reviewed.       Medications:     Current Facility-Administered Medications   Medication Dose Route Frequency    albuterol (PROVENTIL VENTOLIN) nebulizer solution 2.5 mg  2.5 mg Nebulization Q6H PRN    piperacillin-tazobactam (ZOSYN) 3.375 g in 0.9% sodium chloride (MBP/ADV) 100 mL MBP  3.375 g IntraVENous Q8H    enoxaparin (LOVENOX) injection 70 mg  1 mg/kg SubCUTAneous Q12H    alcohol 62% (NOZIN) nasal  1 Ampule  1 Ampule Topical Q12H    balsam peru-castor oiL (VENELEX) ointment   Topical BID    chlorhexidine (ORAL CARE KIT) 0.12 % mouthwash 15 mL  15 mL Oral Q12H    lactulose (CHRONULAC) 10 gram/15 mL solution 30 mL  20 g Per NG tube TID    levothyroxine (SYNTHROID) tablet 50 mcg  50 mcg Per NG tube 6am    metoprolol tartrate (LOPRESSOR) tablet 12.5 mg  12.5 mg Per NG tube Q8H    acetaminophen (TYLENOL) solution 650 mg  650 mg Per NG tube Q6H PRN    Or    acetaminophen (TYLENOL) suppository 650 mg  650 mg Rectal Q6H PRN    ondansetron (ZOFRAN ODT) tablet 4 mg  4 mg Per NG tube Q8H PRN    Or    ondansetron (ZOFRAN) injection 4 mg  4 mg IntraVENous Q6H PRN    polyethylene glycol (MIRALAX) packet 17 g  17 g Per NG tube DAILY PRN    midazolam (VERSED) injection 1 mg  1 mg IntraVENous Q2H PRN    sodium chloride (NS) flush 5-40 mL  5-40 mL IntraVENous Q8H    sodium chloride (NS) flush 5-40 mL  5-40 mL IntraVENous PRN    levETIRAcetam (KEPPRA) injection 1,000 mg  1,000 mg IntraVENous Q12H    insulin lispro (HUMALOG) injection   SubCUTAneous Q6H    glucose chewable tablet 16 g  4 Tablet Oral PRN    glucagon (GLUCAGEN) injection 1 mg  1 mg IntraMUSCular PRN    dextrose 10 % infusion 0-250 mL  0-250 mL IntraVENous PRN    famotidine (PF) (PEPCID) 20 mg in 0.9% sodium chloride 10 mL injection  20 mg IntraVENous Q12H     ______________________________________________________________________  EXPECTED LENGTH OF STAY: 4d 9h  ACTUAL LENGTH OF STAY:          [address], MD   Pulmonary/[facility]  [facility]  [phone]  12/24/2022

## 2022-12-24 NOTE — PROGRESS NOTES
Patient ID:  Elsie Barreto  [id number]  76 y.o.  1965      Date of Consultation:  December 24, 2022    Subjective:       History of Present Illness:   Elsie Barreto is a 59 y.o. male with a history of hypertension, atrial fibrillation on Xarelto and multiple prior stroke who is currently admitted to the hospital with multiple seizures concerning for status epilepticus in the setting of medication noncompliance. He was intubated and transferred to the ICU. His hospital course has been complicated by prolonged altered mental status despite cessation of sedation. Interval history  No acute events reported overnight. It appears that patient cannot get MRI due to prior abandoned pacemaker leads. It appears that his neurological exam is improved over the last 24 hours and has started following some commands intermittently. He remains intubated. Nursing does report that he has had some thick secretions. He has found to be growing ESBL in his respiratory culture. I did discuss his case with his sister and she states that she would like him to be comfort care. Review of chart shows that patient had been on hospice care however transitioned off as he had been doing better neurologically.     Past Medical History:   Diagnosis Date    Acid reflux     Alcohol abuse     Arthropathy, unspecified, site unspecified     Atrial fibrillation ([address]     Calcaneus fracture     Condyloma     Depression     Essential hypertension     H/O mitral valve repair 2006    Along with MAZE procedure    High cholesterol     History of angina     Hypertension     IHSS (idiopathic hypertrophic subaortic stenosis) (HCC)     Ill-defined condition     pacemaker    Impotence of organic origin     LA thrombus 05/2015    LA and ARNULFO thrombus    Pacemaker 2006    Thyroid disease     Trichilemmal cyst 4/10/2017    Unspecified cerebral artery occlusion with cerebral infarction         Past Surgical History:   Procedure Laterality Date    HX APPENDECTOMY      HX CYST REMOVAL      HX HEART CATHETERIZATION      HX OTHER SURGICAL  2005    HX OTHER SURGICAL  2017    excision of scalp mass    HX PACEMAKER  2006    [place] CARDIAC SURG PROCEDURE UNLIST  2006    Mitral Valve Replacement        No family history on file. Social History     Tobacco Use    Smoking status: Former     Types: Cigarettes     Quit date: 2020     Years since quittin.3    Smokeless tobacco: Never   Substance Use Topics    Alcohol use: Yes     Alcohol/week: 0.0 standard drinks     Types: 1 - 2 Cans of beer per week     Comment: occ        Allergies   Allergen Reactions    Bactrim [Sulfamethoxazole-Trimethoprim] Other (comments)     GI Distress        Prior to Admission medications    Medication Sig Start Date End Date Taking? Authorizing Provider   LORazepam (LORazepam IntensoL) 2 mg/mL concentrated solution Take 0.5 mg by mouth every six (6) hours. Yes Provider, Historical   LORazepam (LORazepam IntensoL) 2 mg/mL concentrated solution Take 0.5 mg by mouth every six (6) hours as needed for Anxiety. Yes Provider, Historical   morphine sulfate (MORPHINE CONCENTRATE 20 MG/ML ORAL SOLUTION, ND ONLY,) Take 5 mg by mouth every four (4) hours as needed for Pain. Yes Provider, Historical   furosemide (LASIX) 40 mg tablet Take 40 mg by mouth daily. Yes Provider, Historical   levETIRAcetam (KEPPRA) 750 mg tablet Take 750 mg by mouth daily. Yes Provider, Historical   FLUoxetine (PROzac) 40 mg capsule Take 40 mg by mouth nightly. Yes Provider, Historical   traMADoL (ULTRAM) 50 mg tablet Take 50 mg by mouth every six (6) hours as needed for Pain. Provider, Historical   acetaminophen (TYLENOL) 500 mg tablet Take  by mouth every eight (8) hours as needed for Pain.     Provider, Historical       Review of Systems:    [x]Unable to obtain  ROS due to  []mental status change  []sedated   [x]intubated    Objective:     PHYSICAL EXAMINATION:   Visit Vitals  BP 118/73   Pulse 70   Temp 99.7 °F (37.6 °C)   Resp 27   Wt 153 lb 10.6 oz (69.7 kg)   SpO2 99%   BMI 22.69 kg/m²     General:  intubated,   Neck: no carotid bruits  Lungs: clear to auscultation  Heart:  no murmurs, regular rate and rhythm   Lower extremity: no edema    Neuro exam:   Mental status: Awake however drowsy. He did not follow any commands. He was intubated therefore there is no verbal output. Pupils: 3 mm reactive  Unable to do funduscopic exam due to patient participation   Corneal reflex: Intact no obvious facial asymmetry was noted   Occulocephalic reflex:   Gag/Cough reflex: Motor/Sensory:    No spontaneous movement noted in the upper or lower extremities   patient withdrew to noxious stimulation in all 4 extremities left greater than right    Tone: Normal throughout    Reflexes: Depressed throughout   Plantar response: mute bilaterally     Cerebellar testing:  unable to perform due to patient participation   Gait: unable to assess due to cognitive status      Labs:     Lab Results   Component Value Date/Time    Hemoglobin A1c 5.8 (H) 12/21/2022 01:46 PM    Sodium 146 (H) 12/24/2022 02:10 AM    Potassium 3.6 12/24/2022 02:10 AM    Chloride 114 (H) 12/24/2022 02:10 AM    Glucose 116 (H) 12/24/2022 02:10 AM    BUN 16 12/24/2022 02:10 AM    Creatinine 1.35 (H) 12/24/2022 02:10 AM    Calcium 9.2 12/24/2022 02:10 AM    WBC 9.0 12/24/2022 02:10 AM    HCT 39.0 12/24/2022 02:10 AM    HGB 12.5 12/24/2022 02:10 AM    PLATELET 174 (L) 61/71/7259 02:10 AM       Imaging:    No results found for this or any previous visit. Results from [place] encounter on 12/21/22    CT HEAD WO CONT    Narrative  EXAM: CT HEAD WO CONT    INDICATION: seizure    COMPARISON: December 2, 2022. CONTRAST: None. TECHNIQUE: Unenhanced CT of the head was performed using 5 mm images. Brain and  bone windows were generated. Coronal and sagittal reformats.  CT dose reduction  was achieved through use of a standardized protocol tailored for this  examination and automatic exposure control for dose modulation. FINDINGS:  Remote infarct in the vascular distribution of the left middle cerebral artery,  remote right frontal infarct. No extra-axial fluid collection, hemorrhage or  shift. Impression  No acute changes. Assessment and plan  Elsie Barreto is a 59 y.o. male with a history of hypertension, atrial fibrillation on Xarelto and multiple prior stroke who is currently admitted to the hospital with multiple seizures concerning for status epilepticus in the setting of medication noncompliance. He was intubated and transferred to the ICU. His hospital course has been complicated by prolonged altered mental status despite cessation of sedation. His neurological exam has slowly improved. Status epilepticus: Now resolved. His Depakote was stopped during this admission given his elevated LFTs. Most recent EEG showed no evidence of seizures. Seizures will likely provoked in the setting of medication noncompliance and underlying infection  -Would recommend continuing Keppra 1000 mg twice a day. We will need to stressed the importance of medication compliance once he is extubated. -Ideally would obtain an MRI of the brain however this cannot be done due to his abandoned pacemaker leads. If his mental status does not improve the next 24 to 48 hours, would recommend repeating a noncontrast head CT. -Please place patient on seizure precautions with as needed Ativan available for any seizure lasting greater than then 5 minutes. -May need a goals of care discussion based on his recovery    Thank you for the consultation. We will continue to follow. Please call with any additional questions.     Claudean Or, MD    45 minutes was spent providing medical care of this critically ill patient reviewing records, obtaining additional history from family, examining patient , discussing with collaborating physicians and nursing, and discussing treatment plans.     Active Problems:    Status epilepticus ([facility] [address] (12/21/2022)

## 2022-12-24 NOTE — PROGRESS NOTES
ADULT PROTOCOL: JET AEROSOL ASSESSMENT    Patient  Juanis Gant     59 y.o.   male     2022  8:22 AM    Breath Sounds Pre Procedure: Right Breath Sounds: Clear, Diminished                               Left Breath Sounds: Clear, Diminished    Breath Sounds Post Procedure: Right Breath Sounds: Diminished, Coarse                                 Left Breath Sounds: Diminished, Coarse    Breathing pattern: Pre procedure Breathing Pattern: Regular          Post procedure Breathing Pattern: Regular    Heart Rate: Pre procedure Pulse: 72           Post procedure Pulse: 70    Resp Rate: Pre procedure Respirations: 20           Post procedure Respirations: 20    Cough: Pre procedure Cough: Cough with suction               Post procedure Cough: Cough with suction, Productive    Suctioned: YES    Sputum: Pre procedure                   Post procedure Sputum amount: Small  Sputum color/odor: White  Sputum consistency: Thick  Sputum method obtained: Endotracheal    Oxygen: O2 Device: Endotracheal tube, Ventilator         Changed: NO    SpO2: Pre procedure SpO2: 99 %   with oxygen              Post procedure SpO2: 100 %  with oxygen    Nebulizer Therapy: Current medications Aerosolized Medications: Albuterol      Changed: YES    Patient BBS equal and clear at this time. Patient doesn't take scheduled Albuterol at home. Changed Albuterol frequency to PRN. Smoking History:   Social History     Tobacco Use   Smoking Status Former    Types: Cigarettes    Quit date: 2020    Years since quittin.3   Smokeless Tobacco Never       Problem List:   Patient Active Problem List   Diagnosis Code    IHSS (idiopathic hypertrophic subaortic stenosis) ([facility]) I42.1    Atrial fibrillation (HCC) I48.91    Alcohol abuse F10.10    CAD (coronary artery disease) I25.10    Hypertension I10    Thyroid disease E07.9    High cholesterol E78.00    Depression F32. A    History of CVA (cerebrovascular accident) Z86.73    Aphasia R47.01    GERD (gastroesophageal reflux disease) K21.9    Acute renal failure (ARF) (HCC) N17.9    Atrial mass I51.89    Left atrial thrombus I51. 3    Thrombus of left atrial appendage I51.3    Lower abdominal pain R10.30    Trichilemmal cyst L72.12    Schizophrenia (HCC) F20.9    CVA, old, aphasia I69.320    Seizure ([address] R56.9    Status epilepticus ([address] G40.901       Respiratory Therapist: Jamaal Pérez RT

## 2022-12-25 LAB
ANION GAP SERPL CALC-SCNC: 6 MMOL/L (ref 5–15)
BASOPHILS # BLD: 0 K/UL (ref 0–0.1)
BASOPHILS NFR BLD: 0 % (ref 0–1)
BUN SERPL-MCNC: 23 MG/DL (ref 6–20)
BUN/CREAT SERPL: 18 (ref 12–20)
CALCIUM SERPL-MCNC: 9.4 MG/DL (ref 8.5–10.1)
CHLORIDE SERPL-SCNC: 114 MMOL/L (ref 97–108)
CO2 SERPL-SCNC: 24 MMOL/L (ref 21–32)
CREAT SERPL-MCNC: 1.3 MG/DL (ref 0.7–1.3)
DIFFERENTIAL METHOD BLD: ABNORMAL
EOSINOPHIL # BLD: 0.1 K/UL (ref 0–0.4)
EOSINOPHIL NFR BLD: 1 % (ref 0–7)
ERYTHROCYTE [DISTWIDTH] IN BLOOD BY AUTOMATED COUNT: 14.5 % (ref 11.5–14.5)
GLUCOSE BLD STRIP.AUTO-MCNC: 101 MG/DL (ref 65–117)
GLUCOSE BLD STRIP.AUTO-MCNC: 75 MG/DL (ref 65–117)
GLUCOSE BLD STRIP.AUTO-MCNC: 81 MG/DL (ref 65–117)
GLUCOSE BLD STRIP.AUTO-MCNC: 90 MG/DL (ref 65–117)
GLUCOSE SERPL-MCNC: 99 MG/DL (ref 65–100)
HCT VFR BLD AUTO: 40.3 % (ref 36.6–50.3)
HGB BLD-MCNC: 12.8 G/DL (ref 12.1–17)
IMM GRANULOCYTES # BLD AUTO: 0.1 K/UL (ref 0–0.04)
IMM GRANULOCYTES NFR BLD AUTO: 1 % (ref 0–0.5)
LYMPHOCYTES # BLD: 1 K/UL (ref 0.8–3.5)
LYMPHOCYTES NFR BLD: 12 % (ref 12–49)
MCH RBC QN AUTO: 23.4 PG (ref 26–34)
MCHC RBC AUTO-ENTMCNC: 31.8 G/DL (ref 30–36.5)
MCV RBC AUTO: 73.7 FL (ref 80–99)
MONOCYTES # BLD: 1.1 K/UL (ref 0–1)
MONOCYTES NFR BLD: 13 % (ref 5–13)
NEUTS SEG # BLD: 6.3 K/UL (ref 1.8–8)
NEUTS SEG NFR BLD: 73 % (ref 32–75)
NRBC # BLD: 0 K/UL (ref 0–0.01)
NRBC BLD-RTO: 0 PER 100 WBC
PLATELET # BLD AUTO: 144 K/UL (ref 150–400)
PMV BLD AUTO: ABNORMAL FL (ref 8.9–12.9)
POTASSIUM SERPL-SCNC: 3.4 MMOL/L (ref 3.5–5.1)
RBC # BLD AUTO: 5.47 M/UL (ref 4.1–5.7)
SERVICE CMNT-IMP: NORMAL
SODIUM SERPL-SCNC: 144 MMOL/L (ref 136–145)
WBC # BLD AUTO: 8.6 K/UL (ref 4.1–11.1)

## 2022-12-25 PROCEDURE — 74011250637 HC RX REV CODE- 250/637: Performed by: NURSE PRACTITIONER

## 2022-12-25 PROCEDURE — 74011250636 HC RX REV CODE- 250/636: Performed by: INTERNAL MEDICINE

## 2022-12-25 PROCEDURE — 82962 GLUCOSE BLOOD TEST: CPT

## 2022-12-25 PROCEDURE — 65610000006 HC RM INTENSIVE CARE

## 2022-12-25 PROCEDURE — 94003 VENT MGMT INPAT SUBQ DAY: CPT

## 2022-12-25 PROCEDURE — 74011000258 HC RX REV CODE- 258: Performed by: INTERNAL MEDICINE

## 2022-12-25 PROCEDURE — 99291 CRITICAL CARE FIRST HOUR: CPT | Performed by: PSYCHIATRY & NEUROLOGY

## 2022-12-25 PROCEDURE — 74011250636 HC RX REV CODE- 250/636: Performed by: HOSPITALIST

## 2022-12-25 PROCEDURE — 74011250636 HC RX REV CODE- 250/636: Performed by: NURSE PRACTITIONER

## 2022-12-25 PROCEDURE — 36415 COLL VENOUS BLD VENIPUNCTURE: CPT

## 2022-12-25 PROCEDURE — 74011000250 HC RX REV CODE- 250: Performed by: INTERNAL MEDICINE

## 2022-12-25 PROCEDURE — 85025 COMPLETE CBC W/AUTO DIFF WBC: CPT

## 2022-12-25 PROCEDURE — 74011250637 HC RX REV CODE- 250/637: Performed by: INTERNAL MEDICINE

## 2022-12-25 PROCEDURE — 80048 BASIC METABOLIC PNL TOTAL CA: CPT

## 2022-12-25 RX ORDER — DEXTROSE, SODIUM CHLORIDE, AND POTASSIUM CHLORIDE 5; .45; .15 G/100ML; G/100ML; G/100ML
50 INJECTION INTRAVENOUS CONTINUOUS
Status: DISCONTINUED | OUTPATIENT
Start: 2022-12-25 | End: 2022-12-26

## 2022-12-25 RX ORDER — POTASSIUM CHLORIDE 7.45 MG/ML
10 INJECTION INTRAVENOUS
Status: COMPLETED | OUTPATIENT
Start: 2022-12-25 | End: 2022-12-25

## 2022-12-25 RX ADMIN — MEROPENEM 1 G: 1 INJECTION, POWDER, FOR SOLUTION INTRAVENOUS at 03:50

## 2022-12-25 RX ADMIN — Medication 1 AMPULE: at 09:14

## 2022-12-25 RX ADMIN — POTASSIUM CHLORIDE 10 MEQ: 10 INJECTION, SOLUTION INTRAVENOUS at 05:02

## 2022-12-25 RX ADMIN — CASTOR OIL AND BALSAM, PERU: 788; 87 OINTMENT TOPICAL at 09:52

## 2022-12-25 RX ADMIN — SODIUM CHLORIDE, PRESERVATIVE FREE 20 MG: 5 INJECTION INTRAVENOUS at 21:04

## 2022-12-25 RX ADMIN — ENOXAPARIN SODIUM 70 MG: 100 INJECTION SUBCUTANEOUS at 21:06

## 2022-12-25 RX ADMIN — METOPROLOL TARTRATE 12.5 MG: 25 TABLET, FILM COATED ORAL at 05:53

## 2022-12-25 RX ADMIN — POTASSIUM CHLORIDE, DEXTROSE MONOHYDRATE AND SODIUM CHLORIDE 50 ML/HR: 150; 5; 450 INJECTION, SOLUTION INTRAVENOUS at 19:35

## 2022-12-25 RX ADMIN — LEVETIRACETAM 1000 MG: 100 INJECTION, SOLUTION, CONCENTRATE INTRAVENOUS at 00:35

## 2022-12-25 RX ADMIN — SODIUM CHLORIDE, PRESERVATIVE FREE 10 ML: 5 INJECTION INTRAVENOUS at 12:26

## 2022-12-25 RX ADMIN — POTASSIUM CHLORIDE 10 MEQ: 10 INJECTION, SOLUTION INTRAVENOUS at 09:25

## 2022-12-25 RX ADMIN — ENOXAPARIN SODIUM 70 MG: 100 INJECTION SUBCUTANEOUS at 09:26

## 2022-12-25 RX ADMIN — SODIUM CHLORIDE, PRESERVATIVE FREE 10 ML: 5 INJECTION INTRAVENOUS at 17:58

## 2022-12-25 RX ADMIN — Medication 1 AMPULE: at 20:29

## 2022-12-25 RX ADMIN — POTASSIUM CHLORIDE 10 MEQ: 10 INJECTION, SOLUTION INTRAVENOUS at 06:34

## 2022-12-25 RX ADMIN — MEROPENEM 1 G: 1 INJECTION, POWDER, FOR SOLUTION INTRAVENOUS at 12:27

## 2022-12-25 RX ADMIN — LEVOTHYROXINE SODIUM 50 MCG: 0.05 TABLET ORAL at 05:52

## 2022-12-25 RX ADMIN — LACTULOSE 30 ML: 20 SOLUTION ORAL at 08:54

## 2022-12-25 RX ADMIN — CHLORHEXIDINE GLUCONATE 15 ML: 1.2 RINSE ORAL at 20:30

## 2022-12-25 RX ADMIN — SODIUM CHLORIDE, PRESERVATIVE FREE 20 MG: 5 INJECTION INTRAVENOUS at 09:16

## 2022-12-25 RX ADMIN — LEVETIRACETAM 1000 MG: 100 INJECTION, SOLUTION, CONCENTRATE INTRAVENOUS at 12:24

## 2022-12-25 RX ADMIN — SODIUM CHLORIDE, PRESERVATIVE FREE 10 ML: 5 INJECTION INTRAVENOUS at 21:12

## 2022-12-25 RX ADMIN — CHLORHEXIDINE GLUCONATE 15 ML: 1.2 RINSE ORAL at 09:14

## 2022-12-25 RX ADMIN — MIDAZOLAM 1 MG: 1 INJECTION INTRAMUSCULAR; INTRAVENOUS at 01:27

## 2022-12-25 RX ADMIN — MEROPENEM 1 G: 1 INJECTION, POWDER, FOR SOLUTION INTRAVENOUS at 21:04

## 2022-12-25 RX ADMIN — MIDAZOLAM 1 MG: 1 INJECTION INTRAMUSCULAR; INTRAVENOUS at 05:00

## 2022-12-25 RX ADMIN — SODIUM CHLORIDE, PRESERVATIVE FREE 10 ML: 5 INJECTION INTRAVENOUS at 05:52

## 2022-12-25 RX ADMIN — CASTOR OIL AND BALSAM, PERU: 788; 87 OINTMENT TOPICAL at 20:30

## 2022-12-25 NOTE — PROGRESS NOTES
Critical Care Progress Note  Donaldo Campo MD          Date of Service:  2022  NAME:  Tino Rees  :  1965  MRN:  [id number]      Subjective/Hospital course:      : Patient is weaned off of sedation this morning, no more seizure activity seen but remains unresponsive. : Patient remains off of pressors, minimally responsive. : awake, follows simple commands  : more wide awake, following commands, growing ESBL in sputum. Antibiotics switched to meropenem     Assessment/Plan:     PULMONARY:  Hypercapnic respiratory failure  Secondary to versed for seizure and encephalopathy. HAP. Plan  - wean and extubate today  Continue meropenem     CARDIOVASCULAR/HEMODYNAMIC:  Afib / flutter   CHFrEF        Current vasopressors: none      Plan  - hold most home antihypertensive for now due to relative hypotension  - keep beta blocker but at low dose   - MAP goal > 65 mmHg  - Was on xarelto at home, will continue therapeutic lovenox for now and may change to eliquis later. Has CKD. RENAL:  Ckd - III        Plan  - Monitor I/Os and Uo  - Monitor renal function panel intermittently  - Correct electrolytes as needed  - Avoid nephrotoxic meds        GI/NUTRITION:  Etoh cirrhosis         Plan  - continue home lactulose. INFECTIOUS DISEASE  Pneumonia :ESBL in sputum   UTI : kliebsiella in urine    Plan  - continue meropenem      NEURO  Metabolic encephalopathy  Seizure disorder     Plan  - keppra load in ER  - continue keppra BID  - Dced depakote due to high ammonia and also per neurology he was not taking recently. - neurology following.  -- PT/OT involvement when appropriate        ENDO  Hyperglycemia  hypothyroid     Plan  - SSI  - A1c 5.8.  - on home synthroid  -          GOALS OF CARE/ADVANCED DIRECTIVES  DNR.        DVT prophylaxis: lovenox  SUP: pepcid     Care Plan discussed with: Patient/Family and Nurse  Sister updated at bedside yesterday  Patient was on hospice before but they insurance company took him off because of his functional status. She wants to talk to hospice care. Palliative care following. Already DNR. She wants to continue current care for now. I personally spent 40 minutes of critical care time. This is time spent at this critically ill patient's bedside actively involved in patient care as well as the coordination of care and discussions with the patient's family. This does not include any procedural time which has been billed separately. Review of Systems:   Review of Systems   Unable to perform ROS: Medical condition        Vital Signs:   Patient Vitals for the past 4 hrs:   BP Temp Pulse Resp SpO2   12/25/22 0746 -- -- 70 28 99 %   12/25/22 0700 (!) 133/90 -- 70 24 100 %   12/25/22 0600 133/87 -- 70 30 99 %   12/25/22 0500 138/84 -- 75 29 --   12/25/22 0400 131/87 99 °F (37.2 °C) 70 12 99 %          Intake/Output Summary (Last 24 hours) at 12/25/2022 0756  Last data filed at 12/25/2022 0020  Gross per 24 hour   Intake 540 ml   Output 596 ml   Net -56 ml          Physical Examination:   Awake, looking around  HEENT: normal  Heart: s1,s2  Lungs: clear  Abd: soft  Ext: no edema  Cns: no focal deficit    Labs and Imaging:   Reviewed.       Medications:     Current Facility-Administered Medications   Medication Dose Route Frequency    potassium chloride 10 mEq in 100 ml IVPB  10 mEq IntraVENous Q1H    albuterol (PROVENTIL VENTOLIN) nebulizer solution 2.5 mg  2.5 mg Nebulization Q6H PRN    meropenem (MERREM) 1 g in 0.9% sodium chloride (MBP/ADV) 50 mL MBP  1 g IntraVENous Q8H    enoxaparin (LOVENOX) injection 70 mg  1 mg/kg SubCUTAneous Q12H    alcohol 62% (NOZIN) nasal  1 Ampule  1 Ampule Topical Q12H    balsam peru-castor oiL (VENELEX) ointment   Topical BID    chlorhexidine (ORAL CARE KIT) 0.12 % mouthwash 15 mL  15 mL Oral Q12H    lactulose (CHRONULAC) 10 gram/15 mL solution 30 mL  20 g Per NG tube TID    levothyroxine (SYNTHROID) tablet 50 mcg  50 mcg Per NG tube 6am    metoprolol tartrate (LOPRESSOR) tablet 12.5 mg  12.5 mg Per NG tube Q8H    acetaminophen (TYLENOL) solution 650 mg  650 mg Per NG tube Q6H PRN    Or    acetaminophen (TYLENOL) suppository 650 mg  650 mg Rectal Q6H PRN    ondansetron (ZOFRAN ODT) tablet 4 mg  4 mg Per NG tube Q8H PRN    Or    ondansetron (ZOFRAN) injection 4 mg  4 mg IntraVENous Q6H PRN    polyethylene glycol (MIRALAX) packet 17 g  17 g Per NG tube DAILY PRN    midazolam (VERSED) injection 1 mg  1 mg IntraVENous Q2H PRN    sodium chloride (NS) flush 5-40 mL  5-40 mL IntraVENous Q8H    sodium chloride (NS) flush 5-40 mL  5-40 mL IntraVENous PRN    levETIRAcetam (KEPPRA) injection 1,000 mg  1,000 mg IntraVENous Q12H    insulin lispro (HUMALOG) injection   SubCUTAneous Q6H    glucose chewable tablet 16 g  4 Tablet Oral PRN    glucagon (GLUCAGEN) injection 1 mg  1 mg IntraMUSCular PRN    dextrose 10 % infusion 0-250 mL  0-250 mL IntraVENous PRN    famotidine (PF) (PEPCID) 20 mg in 0.9% sodium chloride 10 mL injection  20 mg IntraVENous Q12H     ______________________________________________________________________  EXPECTED LENGTH OF STAY: 4d 9h  ACTUAL LENGTH OF STAY:          MD Jasmin   Pulmonary/CCM  [address]  [phone]  12/25/2022

## 2022-12-25 NOTE — PROGRESS NOTES
Patient ID:  Kenna Lin  [id number]  35 y.o.  1965      Date of Consultation:  2022    Subjective:       History of Present Illness:   Kenna Lin is a 59 y.o. male with a history of hypertension, atrial fibrillation on Xarelto and multiple prior stroke who is currently admitted to the hospital with multiple seizures concerning for status epilepticus in the setting of medication noncompliance. He was intubated and transferred to the ICU. His hospital course has been complicated by prolonged altered mental status despite cessation of sedation. Interval history  No acute events reported overnight. This morning patient was successfully extubated and family was at bedside. He appears to be very close to his baseline. Past Medical History:   Diagnosis Date    Acid reflux     Alcohol abuse     Arthropathy, unspecified, site unspecified     Atrial fibrillation ([address]     Calcaneus fracture     Condyloma     Depression     Essential hypertension     H/O mitral valve repair     Along with MAZE procedure    High cholesterol     History of angina     Hypertension     IHSS (idiopathic hypertrophic subaortic stenosis) (HCC)     Ill-defined condition     pacemaker    Impotence of organic origin     LA thrombus 2015    LA and ARNULFO thrombus    Pacemaker     Thyroid disease     Trichilemmal cyst 4/10/2017    Unspecified cerebral artery occlusion with cerebral infarction         Past Surgical History:   Procedure Laterality Date    HX APPENDECTOMY      HX CYST REMOVAL      HX HEART CATHETERIZATION      HX OTHER SURGICAL  2005    HX OTHER SURGICAL  2017    excision of scalp mass    HX PACEMAKER  2006    [place] CARDIAC SURG PROCEDURE UNLIST  2006    Mitral Valve Replacement        No family history on file.      Social History     Tobacco Use    Smoking status: Former     Types: Cigarettes     Quit date: 2020     Years since quittin.3    Smokeless tobacco: Never   Substance Use Topics    Alcohol use: Yes     Alcohol/week: 0.0 standard drinks     Types: 1 - 2 Cans of beer per week     Comment: occ        Allergies   Allergen Reactions    Bactrim [Sulfamethoxazole-Trimethoprim] Other (comments)     GI Distress        Prior to Admission medications    Medication Sig Start Date End Date Taking? Authorizing Provider   LORazepam (LORazepam IntensoL) 2 mg/mL concentrated solution Take 0.5 mg by mouth every six (6) hours. Yes Provider, Historical   LORazepam (LORazepam IntensoL) 2 mg/mL concentrated solution Take 0.5 mg by mouth every six (6) hours as needed for Anxiety. Yes Provider, Historical   morphine sulfate (MORPHINE CONCENTRATE 20 MG/ML ORAL SOLUTION, NDG ONLY,) Take 5 mg by mouth every four (4) hours as needed for Pain. Yes Provider, Historical   furosemide (LASIX) 40 mg tablet Take 40 mg by mouth daily. Yes Provider, Historical   levETIRAcetam (KEPPRA) 750 mg tablet Take 750 mg by mouth daily. Yes Provider, Historical   FLUoxetine (PROzac) 40 mg capsule Take 40 mg by mouth nightly. Yes Provider, Historical   traMADoL (ULTRAM) 50 mg tablet Take 50 mg by mouth every six (6) hours as needed for Pain. Provider, Historical   acetaminophen (TYLENOL) 500 mg tablet Take  by mouth every eight (8) hours as needed for Pain. Provider, Historical       Review of Systems:    [x]Unable to obtain  ROS due to  []mental status change  []sedated   [x]intubated    Objective:     PHYSICAL EXAMINATION:   Visit Vitals  /75   Pulse 72   Temp 98.5 °F (36.9 °C)   Resp 24   Wt 153 lb 10.6 oz (69.7 kg)   SpO2 99%   BMI 22.69 kg/m²     General:  intubated,   Neck: no carotid bruits  Lungs: clear to auscultation  Heart:  no murmurs, regular rate and rhythm   Lower extremity: no edema    Neuro exam:   Mental status: Awake and alert. He was able to follow some simple commands.   His speech was difficult to comprehend  Pupils: 3 mm reactive  Unable to do funduscopic exam due to patient participation   Corneal reflex: Intact no obvious facial asymmetry was noted     Motor/Sensory:    No spontaneous movement noted in the upper or lower extremities   patient withdrew to noxious stimulation in all 4 extremities left greater than right    Tone: Normal throughout    Reflexes: Depressed throughout   Plantar response: mute bilaterally     Cerebellar testing:  unable to perform due to patient participation   Gait: unable to assess due to cognitive status      Labs:     Lab Results   Component Value Date/Time    Hemoglobin A1c 5.8 (H) 12/21/2022 01:46 PM    Sodium 144 12/25/2022 03:43 AM    Potassium 3.4 (L) 12/25/2022 03:43 AM    Chloride 114 (H) 12/25/2022 03:43 AM    Glucose 99 12/25/2022 03:43 AM    BUN 23 (H) 12/25/2022 03:43 AM    Creatinine 1.30 12/25/2022 03:43 AM    Calcium 9.4 12/25/2022 03:43 AM    WBC 8.6 12/25/2022 03:43 AM    HCT 40.3 12/25/2022 03:43 AM    HGB 12.8 12/25/2022 03:43 AM    PLATELET 936 (L) 45/23/9790 03:43 AM       Imaging:    No results found for this or any previous visit. Results from [place] encounter on 12/21/22    CT HEAD WO CONT    Narrative  EXAM: CT HEAD WO CONT    INDICATION: seizure    COMPARISON: December 2, 2022. CONTRAST: None. TECHNIQUE: Unenhanced CT of the head was performed using 5 mm images. Brain and  bone windows were generated. Coronal and sagittal reformats. CT dose reduction  was achieved through use of a standardized protocol tailored for this  examination and automatic exposure control for dose modulation. FINDINGS:  Remote infarct in the vascular distribution of the left middle cerebral artery,  remote right frontal infarct. No extra-axial fluid collection, hemorrhage or  shift. Impression  No acute changes.       Assessment and plan  Georgina Brown is a 59 y.o. male with a history of hypertension, atrial fibrillation on Xarelto and multiple prior stroke who is currently admitted to the hospital with multiple seizures concerning for status epilepticus in the setting of medication noncompliance. He was intubated and transferred to the ICU. His hospital course has been complicated by prolonged altered mental status despite cessation of sedation. His neurological exam has slowly improved. Status epilepticus: Now resolved. His Depakote was stopped during this admission given his elevated LFTs. Most recent EEG showed no evidence of seizures. Seizures will likely provoked in the setting of medication noncompliance and underlying infection. His neurological status has improved to his baseline.  -Would recommend continuing Keppra 1000 mg twice a day. We will need to stressed the importance of medication compliance once he is extubated. -Ideally would obtain an MRI of the brain however this cannot be done due to his abandoned pacemaker leads. If his mental status does not improve the next 24 to 48 hours, would recommend repeating a noncontrast head CT. -Please place patient on seizure precautions with as needed Ativan available for any seizure lasting greater than then 5 minutes. -May need a goals of care discussion based on his recovery    Thank you for the consultation. We will sign off. Please call with any additional questions. Please have patient follow-up in neurology clinic in 1 to 2 months    Marissa Holbrook MD    30 minutes was spent providing medical care of this critically ill patient reviewing records, obtaining additional history from family, examining patient , discussing with collaborating physicians and nursing, and discussing treatment plans.     Active Problems:    Status epilepticus ([facility] [address] (12/21/2022)

## 2022-12-25 NOTE — PROGRESS NOTES
Patient passed SBTand RSBI 84. MD placed extubation of airway. Extubated patient to   4L NC. BBS equal and clear at this time.

## 2022-12-25 NOTE — PROGRESS NOTES
1900: Bedside shift report received from [facility] [address]: Shift assessment complete. Pt opens eyes spontaneously and is awake on ventilator. Pt tracks with eyes and intermittently follows commands. Nods head but unsure if it is appropriately or not. Denies pain or discomfort at this time. Aviles draining parul urine. Lungs clear. V-paced on monitor. Pt currently febrile (100.9). NP notified; order noted for blood cultures. See flow sheets for more assessment details. 0900: PRN tylenol given for fever. 0945: Blood cultures sent. 2215: Pt with one large BM. Pt was able to notify me that he had gone with gestures and nod appropriately. While cleaning pt up/pt was turned on rt side, pt went into 27 beats of Vtach. NP notified; labs sent. 0000: Reassessment -no acute changes. See flow sheets for more details. 0125: Pt continuously biting ETT. Attempted to reorient pt multiple times. Pt nods head as if understands, but continues to bite ETT. 1mg PRN versed given for agitation. Will continue to monitor. 0200: Pt had another medium sized, loose BM. Pt bathed and repositioned for comfort. 0350: AM labs sent. - K resulted 3.4. Orders for repletion noted. 0400: Reassessment complete. Pt is now afebrile (99). No other acute changes. See flow sheets for more details. 0700: Bedside shift report given to [facility].

## 2022-12-25 NOTE — PROGRESS NOTES
0700  Report from Franklin Taylor into see patient pressure support 5/5 30% tolerating ok to extubate    0800  Assessment complete  Patient awake alert non verbal intubated on pressure support 5/5 30% tolerating well OGT in place. Aviles in with parul urine. #18 left FA flushed and infusing with KVO and finishing K runs  #20 left upper arm infiltrated will pull.    0905  RRT extubated patient on O2 4 lpm NC strong cough able to clear secretions.       200  Family at bedside  Patient more interactive with family smiling interacting with them    1200  Assessment complete interacting with family and me VSS    1408  Swallow test completed did not pass held water in mouth did not swallow ended up suctioning water out then coughed  RR never increased Sats remained in high 90's  Speech consult already in place    1620  Assessment complete no changes  VSS remains on NC 4 lpm    \"\"Wallet and clothes shorts and socks given to [employer] Sister to take home\"\"\"    1800  Aviles removed and condom cath placed per orders  BS 75 spoke with Dr Marie Alcantar orders for D5 1/2 NS 20 K at 50 ml/hr     1900  Report to [facility]

## 2022-12-26 LAB
ANION GAP SERPL CALC-SCNC: 5 MMOL/L (ref 5–15)
BASOPHILS # BLD: 0 K/UL (ref 0–0.1)
BASOPHILS NFR BLD: 1 % (ref 0–1)
BUN SERPL-MCNC: 21 MG/DL (ref 6–20)
BUN/CREAT SERPL: 27 (ref 12–20)
CALCIUM SERPL-MCNC: 7.6 MG/DL (ref 8.5–10.1)
CHLORIDE SERPL-SCNC: 119 MMOL/L (ref 97–108)
CO2 SERPL-SCNC: 22 MMOL/L (ref 21–32)
CREAT SERPL-MCNC: 0.78 MG/DL (ref 0.7–1.3)
DIFFERENTIAL METHOD BLD: ABNORMAL
EOSINOPHIL # BLD: 0.2 K/UL (ref 0–0.4)
EOSINOPHIL NFR BLD: 3 % (ref 0–7)
ERYTHROCYTE [DISTWIDTH] IN BLOOD BY AUTOMATED COUNT: 14.8 % (ref 11.5–14.5)
GLUCOSE BLD STRIP.AUTO-MCNC: 111 MG/DL (ref 65–117)
GLUCOSE BLD STRIP.AUTO-MCNC: 112 MG/DL (ref 65–117)
GLUCOSE BLD STRIP.AUTO-MCNC: 73 MG/DL (ref 65–117)
GLUCOSE BLD STRIP.AUTO-MCNC: 77 MG/DL (ref 65–117)
GLUCOSE BLD STRIP.AUTO-MCNC: 81 MG/DL (ref 65–117)
GLUCOSE SERPL-MCNC: 88 MG/DL (ref 65–100)
HCT VFR BLD AUTO: 31.7 % (ref 36.6–50.3)
HGB BLD-MCNC: 10.1 G/DL (ref 12.1–17)
IMM GRANULOCYTES # BLD AUTO: 0 K/UL (ref 0–0.04)
IMM GRANULOCYTES NFR BLD AUTO: 1 % (ref 0–0.5)
LYMPHOCYTES # BLD: 0.8 K/UL (ref 0.8–3.5)
LYMPHOCYTES NFR BLD: 13 % (ref 12–49)
MCH RBC QN AUTO: 23.4 PG (ref 26–34)
MCHC RBC AUTO-ENTMCNC: 31.9 G/DL (ref 30–36.5)
MCV RBC AUTO: 73.5 FL (ref 80–99)
MONOCYTES # BLD: 0.7 K/UL (ref 0–1)
MONOCYTES NFR BLD: 11 % (ref 5–13)
NEUTS SEG # BLD: 4.5 K/UL (ref 1.8–8)
NEUTS SEG NFR BLD: 72 % (ref 32–75)
NRBC # BLD: 0 K/UL (ref 0–0.01)
NRBC BLD-RTO: 0 PER 100 WBC
PLATELET # BLD AUTO: 118 K/UL (ref 150–400)
PMV BLD AUTO: ABNORMAL FL (ref 8.9–12.9)
POTASSIUM SERPL-SCNC: 3.1 MMOL/L (ref 3.5–5.1)
RBC # BLD AUTO: 4.31 M/UL (ref 4.1–5.7)
SERVICE CMNT-IMP: NORMAL
SODIUM SERPL-SCNC: 146 MMOL/L (ref 136–145)
WBC # BLD AUTO: 6.2 K/UL (ref 4.1–11.1)

## 2022-12-26 PROCEDURE — 74011250636 HC RX REV CODE- 250/636: Performed by: HOSPITALIST

## 2022-12-26 PROCEDURE — 74011250637 HC RX REV CODE- 250/637: Performed by: INTERNAL MEDICINE

## 2022-12-26 PROCEDURE — 74011250637 HC RX REV CODE- 250/637: Performed by: NURSE PRACTITIONER

## 2022-12-26 PROCEDURE — 36415 COLL VENOUS BLD VENIPUNCTURE: CPT

## 2022-12-26 PROCEDURE — 85025 COMPLETE CBC W/AUTO DIFF WBC: CPT

## 2022-12-26 PROCEDURE — 74011250636 HC RX REV CODE- 250/636: Performed by: NURSE PRACTITIONER

## 2022-12-26 PROCEDURE — 74011250636 HC RX REV CODE- 250/636: Performed by: INTERNAL MEDICINE

## 2022-12-26 PROCEDURE — 77010033678 HC OXYGEN DAILY

## 2022-12-26 PROCEDURE — 74011000250 HC RX REV CODE- 250: Performed by: INTERNAL MEDICINE

## 2022-12-26 PROCEDURE — 80048 BASIC METABOLIC PNL TOTAL CA: CPT

## 2022-12-26 PROCEDURE — 82962 GLUCOSE BLOOD TEST: CPT

## 2022-12-26 PROCEDURE — 74011000258 HC RX REV CODE- 258: Performed by: INTERNAL MEDICINE

## 2022-12-26 PROCEDURE — 65270000046 HC RM TELEMETRY

## 2022-12-26 PROCEDURE — 92610 EVALUATE SWALLOWING FUNCTION: CPT

## 2022-12-26 RX ORDER — POTASSIUM CHLORIDE 7.45 MG/ML
10 INJECTION INTRAVENOUS
Status: COMPLETED | OUTPATIENT
Start: 2022-12-26 | End: 2022-12-26

## 2022-12-26 RX ADMIN — LEVETIRACETAM 1000 MG: 100 INJECTION, SOLUTION, CONCENTRATE INTRAVENOUS at 00:41

## 2022-12-26 RX ADMIN — SODIUM CHLORIDE, PRESERVATIVE FREE 20 MG: 5 INJECTION INTRAVENOUS at 23:42

## 2022-12-26 RX ADMIN — ENOXAPARIN SODIUM 70 MG: 100 INJECTION SUBCUTANEOUS at 09:48

## 2022-12-26 RX ADMIN — Medication 1 AMPULE: at 09:48

## 2022-12-26 RX ADMIN — ENOXAPARIN SODIUM 70 MG: 100 INJECTION SUBCUTANEOUS at 23:43

## 2022-12-26 RX ADMIN — CASTOR OIL AND BALSAM, PERU: 788; 87 OINTMENT TOPICAL at 23:56

## 2022-12-26 RX ADMIN — CHLORHEXIDINE GLUCONATE 15 ML: 1.2 RINSE ORAL at 09:48

## 2022-12-26 RX ADMIN — METOPROLOL TARTRATE 12.5 MG: 25 TABLET, FILM COATED ORAL at 23:42

## 2022-12-26 RX ADMIN — POTASSIUM CHLORIDE 10 MEQ: 10 INJECTION, SOLUTION INTRAVENOUS at 08:59

## 2022-12-26 RX ADMIN — CASTOR OIL AND BALSAM, PERU: 788; 87 OINTMENT TOPICAL at 09:47

## 2022-12-26 RX ADMIN — POTASSIUM CHLORIDE 10 MEQ: 10 INJECTION, SOLUTION INTRAVENOUS at 04:58

## 2022-12-26 RX ADMIN — SODIUM CHLORIDE, PRESERVATIVE FREE 10 ML: 5 INJECTION INTRAVENOUS at 13:32

## 2022-12-26 RX ADMIN — SODIUM CHLORIDE, PRESERVATIVE FREE 10 ML: 5 INJECTION INTRAVENOUS at 23:44

## 2022-12-26 RX ADMIN — SODIUM CHLORIDE, PRESERVATIVE FREE 10 ML: 5 INJECTION INTRAVENOUS at 05:32

## 2022-12-26 RX ADMIN — MEROPENEM 1 G: 1 INJECTION, POWDER, FOR SOLUTION INTRAVENOUS at 04:17

## 2022-12-26 RX ADMIN — MEROPENEM 1 G: 1 INJECTION, POWDER, FOR SOLUTION INTRAVENOUS at 23:42

## 2022-12-26 RX ADMIN — MEROPENEM 1 G: 1 INJECTION, POWDER, FOR SOLUTION INTRAVENOUS at 12:56

## 2022-12-26 RX ADMIN — LEVETIRACETAM 1000 MG: 100 INJECTION, SOLUTION, CONCENTRATE INTRAVENOUS at 12:56

## 2022-12-26 RX ADMIN — POTASSIUM CHLORIDE 10 MEQ: 10 INJECTION, SOLUTION INTRAVENOUS at 06:25

## 2022-12-26 RX ADMIN — SODIUM CHLORIDE, PRESERVATIVE FREE 20 MG: 5 INJECTION INTRAVENOUS at 09:48

## 2022-12-26 RX ADMIN — METOPROLOL TARTRATE 12.5 MG: 25 TABLET, FILM COATED ORAL at 14:15

## 2022-12-26 RX ADMIN — POTASSIUM CHLORIDE 10 MEQ: 10 INJECTION, SOLUTION INTRAVENOUS at 07:39

## 2022-12-26 RX ADMIN — Medication 1 AMPULE: at 23:41

## 2022-12-26 NOTE — PROGRESS NOTES
0700  Report from [facility]  Patient is awake alert aphasic no complaints of pain or discomfort on 2 lpm nc O2 with sat's at 100%  Condom cath in place no output as of yet. 2 PIV in place #18 left FA and #20 right upper arm. Follows commands left arm remains flaccid moves all over ext's. Aphasic. NPO awaiting for speech to see him today    8946  Patient indicated to me he was wet condom cath came off cleaned and reposition for comfort  O2 off sat's upper 90's    1040  Speech therapy into see patient ok to feed    1200  Assessment complete no changes    1330  Dr Thais De Luna into see patient since he is eating now ok to stop fluids  some pedal edema noted per Dr Thais De Luna    1400  Bowel movement cleaned and new condom cath place    1425  TRANSFER - OUT REPORT:    Verbal report given to Brady BRITT on [place]  being transferred to 2112(unit) for routine progression of care       Report consisted of patients Situation, Background, Assessment and   Recommendations(SBAR). Information from the following report(s) SBAR, Kardex, Procedure Summary, Intake/Output, Recent Results, and Cardiac Rhythm V-Paced  was reviewed with the receiving nurse. Lines:   Peripheral IV 12/21/22 Left Antecubital (Active)   Site Assessment Clean, dry, & intact 12/26/22 1200   Phlebitis Assessment 0 12/26/22 1200   Infiltration Assessment 0 12/26/22 1200   Dressing Status Clean, dry, & intact 12/26/22 1200   Dressing Type Transparent;Tape 12/26/22 1200   Hub Color/Line Status Green;Flushed;Capped 12/26/22 1200   Action Taken Open ports on tubing capped 12/26/22 1200   Alcohol Cap Used Yes 12/26/22 1200       Peripheral IV 12/26/22 Right;Upper (Active)   Site Assessment Clean, dry, & intact 12/26/22 1200   Phlebitis Assessment 0 12/26/22 1200   Infiltration Assessment 0 12/26/22 1200   Dressing Status Clean, dry, & intact 12/26/22 1200   Dressing Type Transparent;Tape 12/26/22 1200   Hub Color/Line Status Pink;Flushed; Infusing 12/26/22 1200   Action Taken Open ports on tubing capped 12/26/22 1200   Alcohol Cap Used Yes 12/26/22 1200        Opportunity for questions and clarification was provided.       Patient transported with:   Monitor  Tech

## 2022-12-26 NOTE — PROGRESS NOTES
Hospitalist Progress Note    NAME: Carol Rodgers   :  1965   MRN:  [id number]       Assessment / Plan:    The patient is 59years old man with past medical history significant for hypertension, A. 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 Patient was intubated on arrival for airway protection and was extubated in . Currently is on room air out of the ICU on . Seizure with status epilepticus, POA  History of CVA with right side hemiparesis and residual aphasia, POA  - Patient back to baseline as per family  - No more seizure episodes reported since arrival to the hospital  - Neuro input is appreciated, to continue Keppra 1000 mg IV twice daily for now    Paroxysmal A. fib  History of chronic heart failure reduced ejection fraction  - Continue home metoprolol  - Currently on therapeutic dose of Lovenox, switch back to Xarelto once p.o. intake as tolerated    Hypercapnic respiratory failure, resolved  Healthcare associated pneumonia, POA  - Intubated initially for airway protection  - Extubated on   - Currently on room air  - Started on IV antibiotics, continue to complete 7-day course    Hypothyroidism  - Continue home Synthroid    CKD stage III  - Creatinine baseline around 1.5    ESBL UTI  - Continue meropenem to complete total of 7 days        18.5 - 24.9 Normal weight / Body mass index is 22.69 kg/m². Estimated discharge date:   Barriers: Clinical improvement    Code status: DNR  Prophylaxis: Lovenox  Recommended Disposition: SNF/LTC     Subjective:     Patient was seen and examined. No acute events overnight. Discussed with RN overnight events. Patient sister bedside, all questions were answered.     Patient is aphasic, unable to complete review of other system              Objective:     VITALS:   Last 24hrs VS reviewed since prior progress note. Most recent are:  Patient Vitals for the past 24 hrs:   Temp Pulse Resp BP SpO2   12/26/22 1300 -- 70 28 115/71 97 %   12/26/22 1200 98.3 °F (36.8 °C) 70 28 102/60 98 %   12/26/22 1100 -- 70 23 105/66 98 %   12/26/22 1000 -- 70 23 98/65 94 %   12/26/22 0947 -- 70 23 -- 94 %   12/26/22 0900 -- 70 25 107/62 100 %   12/26/22 0800 98.7 °F (37.1 °C) 70 24 109/64 95 %   12/26/22 0750 -- -- -- -- 97 %   12/26/22 0700 -- 70 25 105/63 96 %   12/26/22 0600 -- 71 24 108/65 --   12/26/22 0500 -- 70 25 108/62 98 %   12/26/22 0400 97.7 °F (36.5 °C) 70 28 (!) 100/57 97 %   12/26/22 0200 -- 70 24 109/71 97 %   12/26/22 0100 -- 71 24 112/66 97 %   12/26/22 0000 98.2 °F (36.8 °C) 70 29 (!) 109/59 99 %   12/25/22 2300 -- 70 28 (!) 101/55 100 %   12/25/22 2200 -- 70 (!) 32 119/65 100 %   12/25/22 2100 -- 70 29 108/63 99 %   12/25/22 2000 -- 72 28 107/63 98 %   12/25/22 1945 -- -- -- -- 100 %   12/25/22 1900 97.5 °F (36.4 °C) 74 27 108/61 100 %   12/25/22 1800 -- 74 29 106/64 97 %   12/25/22 1700 -- 72 28 108/65 98 %   12/25/22 1620 98 °F (36.7 °C) 71 25 (!) 108/59 99 %   12/25/22 1500 -- 70 26 106/63 99 %   12/25/22 1408 -- 72 24 -- 99 %   12/25/22 1400 -- 70 25 123/75 99 %       Intake/Output Summary (Last 24 hours) at 12/26/2022 1341  Last data filed at 12/26/2022 1311  Gross per 24 hour   Intake 854.17 ml   Output 675 ml   Net 179.17 ml        I had a face to face encounter and independently examined this patient on 12/26/2022, as outlined below:  PHYSICAL EXAM:  General: WD, WN. Alert, cooperative, no acute distress    EENT:  EOMI. Anicteric sclerae. MMM  Resp:  CTA bilaterally, no wheezing or rales. No accessory muscle use  CV:  Regular  rhythm,  No edema  GI:  Soft, Non distended, Non tender. +Bowel sounds  Neurologic:  EOMs intact. No facial asymmetry.   Alert, cooperative, aphasic at baseline with right hemiparesis from previous CVA    Psych:   Not anxious nor agitated  Skin:  No rashes. No jaundice    Reviewed most current lab test results and cultures  YES  Reviewed most current radiology test results   YES  Review and summation of old records today    NO  Reviewed patient's current orders and MAR    YES  PMH/SH reviewed - no change compared to H&P  ________________________________________________________________________  Care Plan discussed with:    Comments   Patient X    Family  x Sister at bedside   RN X    Care Manager     Consultant                        Multidiciplinary team rounds were held today with , nursing, pharmacist and clinical coordinator. Patient's plan of care was discussed; medications were reviewed and discharge planning was addressed. ________________________________________________________________________        Comments   >50% of visit spent in counseling and coordination of care X    ________________________________________________________________________  Georgia Valera MD     Procedures: see electronic medical records for all procedures/Xrays and details which were not copied into this note but were reviewed prior to creation of Plan. LABS:  I reviewed today's most current labs and imaging studies.   Pertinent labs include:  Recent Labs     12/26/22  0321 12/25/22  0343 12/24/22  0210   WBC 6.2 8.6 9.0   HGB 10.1* 12.8 12.5   HCT 31.7* 40.3 39.0   * 144* 110*     Recent Labs     12/26/22  0321 12/25/22  0343 12/24/22 2236 12/24/22  0210   * 144 144 146*   K 3.1* 3.4* 3.6 3.6   * 114* 113* 114*   CO2 22 24 25 22   GLU 88 99 113* 116*   BUN 21* 23* 22* 16   CREA 0.78 1.30 1.27 1.35*   CA 7.6* 9.4 9.3 9.2   MG  --   --  1.9 2.1   PHOS  --   --  2.6 2.7       Signed: Georgia Valera MD

## 2022-12-26 NOTE — PROGRESS NOTES
ICU DAILY PROGRESS NOTE      Summary  59 y.o. male with a history of hypertension, atrial fibrillation on Xarelto and multiple prior stroke was admitted to the hospital with multiple seizures concerning for status epilepticus in the setting of medication noncompliance. He was intubated and transferred to the ICU. His hospital course has been complicated by prolonged altered mental status despite cessation of sedation. He was successfully extubated on morning of 12/25. Previous 24 Hours    Extubated morning of 12/25. No events overnight. [facility]    Critical Care Problems    12/22: Patient is weaned off of sedation this morning, no more seizure activity seen but remains unresponsive. 12/23: Patient remains off of pressors, minimally responsive. 12/24: awake, follows simple commands  12/25: more wide awake, following commands, growing ESBL in sputum. Antibiotics switched to meropenem  12/26: appears to be at clinical baseline    Patient Active Problem List   Diagnosis Code    IHSS (idiopathic hypertrophic subaortic stenosis) ([facility]) I42.1    Atrial fibrillation ([facility]) I48.91    Alcohol abuse F10.10    CAD (coronary artery disease) I25.10    Hypertension I10    Thyroid disease E07.9    High cholesterol E78.00    Depression F32. A    History of CVA (cerebrovascular accident) Z86.73    Aphasia R47.01    GERD (gastroesophageal reflux disease) K21.9    Acute renal failure (ARF) ([facility]) N17.9    Atrial mass I51.89    Left atrial thrombus I51. 3    Thrombus of left atrial appendage I51.3    Lower abdominal pain R10.30    Trichilemmal cyst L72.12    Schizophrenia ([facility]) F20.9    CVA, old, aphasia I69.320    Seizure ([address] R56.9    Status epilepticus ([address] G40.901       Plan    Neuro  Seizures  H/O CVA with residual aphasia and right hemiparesis. - currently at his baseline  - continue [address]  - Reviewed neurology note.  Pt will need discussion about medication compliance prior to discharge. CVS  H/o afib  H/o CHFrEF  Hemodynamics acceptable  - continue metoprolol    Pulm  Hypercapneic respiratory failure- resolved  HAP  - continue antibiotics to complete 7 day course  - saturating well    GI  - restart diet  - on famotidine  - h/o Elevated ammonia. He had ammonia level of 165 on  down to 12 on . Continue lactulose    Renal  CKD  - replete potassium, K 3.1 today. Endo  Hypothyroidism  - on home synthroid    ID  - cont meropenem  - blood cx pending    Heme  - on lovenox    Vitals  Visit Vitals  /66   Pulse 70   Temp 98.7 °F (37.1 °C)   Resp 23   Ht 5' 9\" (1.753 m)   Wt 69.7 kg (153 lb 10.6 oz)   SpO2 98%   BMI 22.69 kg/m²    O2 Flow Rate (L/min): 2 l/min O2 Device: None (Room air) Temp (24hrs), Av °F (36.7 °C), Min:97.5 °F (36.4 °C), Max:98.7 °F (37.1 °C)           Intake/Output:     Intake/Output Summary (Last 24 hours) at 2022 1200  Last data filed at 2022 1100  Gross per 24 hour   Intake 854.17 ml   Output 575 ml   Net 279.17 ml         Physical exam:  Physical Exam  Eyes:      Pupils: Pupils are equal, round, and reactive to light. Cardiovascular:      Rate and Rhythm: Normal rate. Pulses: Normal pulses. Pulmonary:      Effort: Pulmonary effort is normal.      Breath sounds: Normal breath sounds. Abdominal:      Palpations: Abdomen is soft. Skin:     General: Skin is warm and dry. Neurological:      Mental Status: He is alert. Mental status is at baseline. Comments: Baseline aphasia and right upper extremity weakness.           I have examined the patient on this day 2022 and the above documented exam is accurate including the components that have been copied forward    LABS AND  DATA: Personally reviewed  Recent Labs     22  03222  0343   WBC 6.2 8.6   HGB 10.1* 12.8   HCT 31.7* 40.3   * 144*     Recent Labs     22  0321 22  0343 22  2236 22  0210   * 144 144 146*   K 3.1* 3. 4* 3.6 3.6   * 114* 113* 114*   CO2 22 24 25 22   BUN 21* 23* 22* 16   CREA 0.78 1.30 1.27 1. 35*   GLU 88 99 113* 116*   CA 7.6* 9.4 9.3 9.2   MG  --   --  1.9 2.1   PHOS  --   --  2.6 2.7     No results for input(s): AP, TBIL, TP, ALB, GLOB, AML, LPSE in the last 72 hours. No lab exists for component: SGOT, GPT, AMYP  No results for input(s): INR, PTP, APTT, INREXT in the last 72 hours. No results for input(s): PHI, PCO2I, PO2I, FIO2I in the last 72 hours. No results for input(s): CPK, CKMB, TROIQ, BNPP in the last 72 hours. Hemodynamics:   PAP:   CO:     Wedge:   CI:     CVP:    SVR:       PVR:       Ventilator Settings:  Mode Rate Tidal Volume Pressure FiO2 PEEP   Pressure support, Spontaneous   520 ml  (S) 5 cm H2O (Trialing on SBT) 30 % 5 cm H20     Peak airway pressure: 12 cm H2O    Minute ventilation: 9.4 l/min        MEDS: Reviewed    Chest X-Ray:  CXR Results  (Last 48 hours)      None                Multidisciplinary Rounds Completed:  N/A      DISPOSITION  Transfer to non-ICU bed    CRITICAL CARE CONSULTANT NOTE  I had a in-person encounter with Tino Rees, reviewed and interpreted patient data including events, labs, images, vital signs, I/O's, and examined patient. I have discussed the case and the plan and management of the patient's care with the consulting services, the bedside nurses and the respiratory therapist.      NOTE OF PERSONAL INVOLVEMENT IN CARE   This patient is at high risk for sudden and clinically significant deterioration, which requires the highest level of preparedness to intervene urgently. I participated in the decision-making and personally managed or directed the management of the following life and organ supporting interventions that required my frequent assessment to treat or prevent imminent deterioration. I personally spent clinical care time.   This is time spent at patient's bedside actively involved in patient care as well as the coordination of care and discussions with the patient's family. This does not include any procedural time which has been billed separately.       ------------------------------------------------------------------------------    Koko Shaver MD, PhD  [address] [phone]

## 2022-12-26 NOTE — PROGRESS NOTES
Comprehensive Nutrition Assessment    Type and Reason for Visit: Initial, Positive nutrition screen    Nutrition Recommendations/Plan:   Advance diet per SLP  RD to add PO supplements as able     Malnutrition Assessment:  Malnutrition Status:  Severe malnutrition (12/26/22 1028)    Context:  Acute illness     Findings of the 6 clinical characteristics of malnutrition:   Energy Intake:  50% or less of est energy requirements for 5 or more days  Weight Loss:  Greater than 7.5% over 3 months     Body Fat Loss:   ,     Muscle Mass Loss:   ,    Fluid Accumulation:  No significant fluid accumulation,     Strength:  Not performed     Nutrition Assessment:  Pt admitted with status epilepticus. PMH: CKD stage 3, CVA, cirrhosis, recently on hospice. Chart reviewed for MST (unsure of wt loss). Pt aphasic. Noted significant wt loss per EMR, pt endorses accuracy and nods his head 'yes' to poor appetite PTA, unable to get details though as he is nonverbal.  Pt failed victor manuel swallow screen over the weekend, SLP consult noted. Pt agreeable to try PO supplements once diet advances. Wt Readings from Last 15 Encounters:   12/24/22 69.7 kg (153 lb 10.6 oz)   09/28/22 81.6 kg (180 lb)   09/09/22 80.5 kg (177 lb 7.5 oz)   09/21/21 77.5 kg (170 lb 12.8 oz)   08/13/21 76.7 kg (169 lb)   05/01/17 81.6 kg (180 lb)   04/20/17 81.6 kg (180 lb)   04/14/17 81.6 kg (180 lb)   04/10/17 82.6 kg (182 lb)   06/26/16 89 kg (196 lb 3.4 oz)   10/12/15 86.2 kg (190 lb)   08/15/15 93.8 kg (206 lb 12.8 oz)   06/30/15 90.7 kg (200 lb)   05/15/15 91.6 kg (201 lb 14.4 oz)   01/11/15 93 kg (205 lb)            Nutrition Related Findings:    Meds: pepcid, humalog, lactulose, merrem, KCl, D5 Jose [email or web address].   BM 12/24 Wound Type: None    Current Nutrition Intake & Therapies:  Average Meal Intake: NPO     DIET NPO    Anthropometric Measures:  Height: 5' 9\" (175.3 cm)  Ideal Body Weight (IBW): 160 lbs (73 kg)     Current Body Wt:  69.7 kg (153 lb 10.6 oz), 96 % IBW. Bed scale  Current BMI (kg/m2): 22.7                          BMI Category: Normal weight (BMI 22.0-24.9) age over 72    Estimated Daily Nutrient Needs:  Energy Requirements Based On: Formula  Weight Used for Energy Requirements: Current  Energy (kcal/day): MSJ 1950 (1503 x 1.3)     Protein (g/day): 70-84g (1-1.2gPro/kg)  Method Used for Fluid Requirements: 1 ml/kcal  Fluid (ml/day): 1950mL    Nutrition Diagnosis:   Inadequate protein-energy intake related to swallowing difficulty as evidenced by NPO or clear liquid status due to medical condition    Nutrition Interventions:   Food and/or Nutrient Delivery: Start oral diet  Nutrition Education/Counseling: No recommendations at this time  Coordination of Nutrition Care: Continue to monitor while inpatient       Goals:     Goals: Initiate PO diet       Nutrition Monitoring and Evaluation:   Behavioral-Environmental Outcomes: None identified  Food/Nutrient Intake Outcomes: IVF intake  Physical Signs/Symptoms Outcomes: Biochemical data, Nutrition focused physical findings, Skin, Weight, Chewing or swallowing    Discharge Planning:     Too soon to determine    Felipe Carson RD, CNSC  Contact: ext [phone]

## 2022-12-26 NOTE — PROGRESS NOTES
1900: Bedside shift report received from [place], [address]: Shift assessment complete. Pt is alert and resting quietly with no c/o pain or discomfort. Pt able to nod head appropriately and follow commands, however words are incomprehensible. Pt attempt to speak infrequently. Pt respirations are shallow and tachypneic with slight abdominal breathing noted. O2 saturations are 100% on 4L NC. Ventricular paced on monitor. RUE weaker than LUE, very minimal movement noted. Spontaneous, weak movements in all four extremities. See flow sheets for more assessment details. 2020: Per NP, PO metoprolol and lactulose to be held until speech consult tomorrow. Will monitor HR.   2100: Multiple attempts to place PIV unsuccessful. Continuous fluids paused d/t lack of IV access and incompatibility with scheduled antibiotic. NP aware. Will restart after antibiotic completes. 0000: Reassessment complete. No acute changes. See flow sheets for more details. 0330: Pt bathed and repositioned for comfort. AM labs sent. - K resulted 3.1. NP notified. Orders for repletion noted. 0400: Reassessment complete. No acute changes. See flow sheets for more details. 46: NP at bedside to place PIV.   0700: Bedside shift report given to [facility].

## 2022-12-26 NOTE — PROGRESS NOTES
SPEECH PATHOLOGY BEDSIDE SWALLOW EVALUATION/DISCHARGE  Patient: Carol Rodgers (64 y.o. male)  Date: 12/26/2022  Primary Diagnosis: Status epilepticus ([facility] [G40.901]       Precautions:        ASSESSMENT :  Based on the objective data described below, the patient presents with no oral/pharyngeal dysphagia, no oral holding, and no overt s/s aspiration observed. Patient alert and self-fed with L hand. Patient known to this SLP from past admission during which oral holding noted when confused. Note oral holding noted after extubation yesterday as well, however none at this time. Recommend hold PO when patient demonstrating prolonged oral holding, however re-offer when mental status improves. Skilled acute therapy provided by a speech-language pathologist is not indicated at this time. PLAN :  Recommendations:  -Regular/thin liquid diet, allow patient to self-feed  -If prolonged oral holding noted, hold PO until mental status improves, then re-offer when mental status improves, self-feeding and no oral holding. Additional SLP consult not needed for oral holding  Discharge Recommendations: None     SUBJECTIVE:   Patient vocalized however it was unintelligible.     OBJECTIVE:     Past Medical History:   Diagnosis Date    Acid reflux     Alcohol abuse     Arthropathy, unspecified, site unspecified     Atrial fibrillation ([facility]     Calcaneus fracture     Condyloma     Depression     Essential hypertension     H/O mitral valve repair 2006    Along with MAZE procedure    High cholesterol     History of angina     Hypertension     IHSS (idiopathic hypertrophic subaortic stenosis) ([facility])     Ill-defined condition     pacemaker    Impotence of organic origin     LA thrombus 05/2015    LA and ARNULFO thrombus    Pacemaker 2006    Thyroid disease     Trichilemmal cyst 4/10/2017    Unspecified cerebral artery occlusion with cerebral infarction      Past Surgical History:   Procedure Laterality Date    HX APPENDECTOMY      HX CYST REMOVAL      HX HEART CATHETERIZATION      HX OTHER SURGICAL  4/2005    HX OTHER SURGICAL  04/20/2017    excision of scalp mass    HX PACEMAKER  07/14/2006    OK CARDIAC SURG PROCEDURE UNLIST  07/13/2006    Mitral Valve Replacement     Prior Level of Function/Home Situation:   Home Situation  Home Environment: Skilled nursing facility  One/Two Story Residence: Other (Comment)  Living Alone: No  Support Systems: [place]  Patient Expects to be Discharged to[de-identified] Long Term Care  Current DME Used/Available at Home: None  Diet prior to admission: regular/thin  Current Diet:  NPO   Cognitive and Communication Status:  Neurologic State: Alert  Orientation Level: Unable to verbalize  Cognition: Follows commands           Oral Assessment:  Oral Assessment  Dentition: Natural  P.O. Trials:  Patient Position: up in bed  Vocal quality prior to P.O.: No impairment  Consistency Presented: Thin liquid; Solid;Puree  How Presented: SLP-fed/presented;Spoon;Self-fed/presented;Cup/gulp; Successive swallows     Bolus Acceptance: No impairment  Bolus Formation/Control: No impairment     Propulsion: No impairment  Oral Residue: None        Aspiration Signs/Symptoms: None  Pharyngeal Phase Characteristics: No impairment, issues, or problems              Oral Phase Severity: No impairment  Pharyngeal Phase Severity : No impairment  NOMS:   The NOMS functional outcome measure was used to quantify this patient's level of swallowing impairment. Based on the NOMS, the patient was determined to be at level 7 for swallow function     NOMS Swallowing Levels:  Level 1 (CN): NPO  Level 2 (CM): NPO but takes consistency in therapy  Level 3 (CL): Takes less than 50% of nutrition p.o. and continues with nonoral feedings; and/or safe with mod cues; and/or max diet restriction  Level 4 (CK):  Safe swallow but needs mod cues; and/or mod diet restriction; and/or still requires some nonoral feeding/supplements  Level 5 (CJ): Safe swallow with min diet restriction; and/or needs min cues  Level 6 (CI): Independent with p.o.; rare cues; usually self cues; may need to avoid some foods or needs extra time  Level 7 ([address] [place] [address]): Independent for all p.o.  RASHEED. (2003). National Outcomes Measurement System (NOMS): Adult Speech-Language Pathology User's Guide. Pain:  Pain Scale 1: Adult Nonverbal Pain Scale  Pain Intensity 1: 0     After treatment:   Patient left in no apparent distress in bed, Call bell within reach, and Nursing notified    COMMUNICATION/EDUCATION:   Patient was educated regarding his deficit(s) of WFL swallow as this relates to his diagnosis. He demonstrated Good understanding as evidenced by nodding. The patient's plan of care including recommendations, planned interventions, and recommended diet changes were discussed with: Registered nurse. Thank you for this referral.  KATHLEEN Murphy  Time Calculation: 15 mins          Speech pathology brief note; full note to follow. Patient with [facility] oral/pharyngeal swallow when self-fed with L hand, and no overt s/s aspiration observed. Recommend regular/thin liquid diet.     Farnaz Vieira., CCC-SLP

## 2022-12-26 NOTE — PROGRESS NOTES
Physical Therapy  Referral received, chart reviewed, and attempted to see patient for PT evaluation. Patient with limited verbal responses. Sister at bedside and she provided PLOF. Patient lives in [address] [place] [address]. At baseline, able to ambulate short distances with RW, transfer, and self propel w/c throughout facility. He requires some assistance with dressing and staff assists with bathing. Feeds himself. She reports increased right sided weakness at this time. Formal PT evaluation deferred at this time due to arrival of transport to take patient to stepdown unit. Will plan to follow up tomorrow.    Govind London, PT, DPT

## 2022-12-27 LAB
ANION GAP SERPL CALC-SCNC: 6 MMOL/L (ref 5–15)
BACTERIA SPEC CULT: NORMAL
BACTERIA SPEC CULT: NORMAL
BASOPHILS # BLD: 0 K/UL (ref 0–0.1)
BASOPHILS NFR BLD: 1 % (ref 0–1)
BUN SERPL-MCNC: 24 MG/DL (ref 6–20)
BUN/CREAT SERPL: 23 (ref 12–20)
CALCIUM SERPL-MCNC: 9.5 MG/DL (ref 8.5–10.1)
CHLORIDE SERPL-SCNC: 114 MMOL/L (ref 97–108)
CO2 SERPL-SCNC: 25 MMOL/L (ref 21–32)
COVID-19 RAPID TEST, COVR: NOT DETECTED
CREAT SERPL-MCNC: 1.05 MG/DL (ref 0.7–1.3)
DIFFERENTIAL METHOD BLD: ABNORMAL
EOSINOPHIL # BLD: 0.2 K/UL (ref 0–0.4)
EOSINOPHIL NFR BLD: 2 % (ref 0–7)
ERYTHROCYTE [DISTWIDTH] IN BLOOD BY AUTOMATED COUNT: 14.9 % (ref 11.5–14.5)
GLUCOSE BLD STRIP.AUTO-MCNC: 105 MG/DL (ref 65–117)
GLUCOSE BLD STRIP.AUTO-MCNC: 113 MG/DL (ref 65–117)
GLUCOSE BLD STRIP.AUTO-MCNC: 84 MG/DL (ref 65–117)
GLUCOSE BLD STRIP.AUTO-MCNC: 97 MG/DL (ref 65–117)
GLUCOSE SERPL-MCNC: 99 MG/DL (ref 65–100)
HCT VFR BLD AUTO: 37.5 % (ref 36.6–50.3)
HGB BLD-MCNC: 11.8 G/DL (ref 12.1–17)
IMM GRANULOCYTES # BLD AUTO: 0.1 K/UL (ref 0–0.04)
IMM GRANULOCYTES NFR BLD AUTO: 1 % (ref 0–0.5)
LYMPHOCYTES # BLD: 1.3 K/UL (ref 0.8–3.5)
LYMPHOCYTES NFR BLD: 19 % (ref 12–49)
MCH RBC QN AUTO: 23.4 PG (ref 26–34)
MCHC RBC AUTO-ENTMCNC: 31.5 G/DL (ref 30–36.5)
MCV RBC AUTO: 74.4 FL (ref 80–99)
MONOCYTES # BLD: 0.9 K/UL (ref 0–1)
MONOCYTES NFR BLD: 12 % (ref 5–13)
NEUTS SEG # BLD: 4.5 K/UL (ref 1.8–8)
NEUTS SEG NFR BLD: 65 % (ref 32–75)
NRBC # BLD: 0 K/UL (ref 0–0.01)
NRBC BLD-RTO: 0 PER 100 WBC
PLATELET # BLD AUTO: 149 K/UL (ref 150–400)
PMV BLD AUTO: 11.9 FL (ref 8.9–12.9)
POTASSIUM SERPL-SCNC: 4.6 MMOL/L (ref 3.5–5.1)
RBC # BLD AUTO: 5.04 M/UL (ref 4.1–5.7)
SERVICE CMNT-IMP: NORMAL
SODIUM SERPL-SCNC: 145 MMOL/L (ref 136–145)
SOURCE, COVRS: NORMAL
WBC # BLD AUTO: 7 K/UL (ref 4.1–11.1)

## 2022-12-27 PROCEDURE — 74011250637 HC RX REV CODE- 250/637: Performed by: ANESTHESIOLOGY

## 2022-12-27 PROCEDURE — 97165 OT EVAL LOW COMPLEX 30 MIN: CPT

## 2022-12-27 PROCEDURE — 74011250636 HC RX REV CODE- 250/636: Performed by: INTERNAL MEDICINE

## 2022-12-27 PROCEDURE — 65270000046 HC RM TELEMETRY

## 2022-12-27 PROCEDURE — 74011250636 HC RX REV CODE- 250/636: Performed by: HOSPITALIST

## 2022-12-27 PROCEDURE — 80048 BASIC METABOLIC PNL TOTAL CA: CPT

## 2022-12-27 PROCEDURE — 97161 PT EVAL LOW COMPLEX 20 MIN: CPT

## 2022-12-27 PROCEDURE — 94760 N-INVAS EAR/PLS OXIMETRY 1: CPT

## 2022-12-27 PROCEDURE — 87635 SARS-COV-2 COVID-19 AMP PRB: CPT

## 2022-12-27 PROCEDURE — 74011000250 HC RX REV CODE- 250: Performed by: INTERNAL MEDICINE

## 2022-12-27 PROCEDURE — 36415 COLL VENOUS BLD VENIPUNCTURE: CPT

## 2022-12-27 PROCEDURE — 85025 COMPLETE CBC W/AUTO DIFF WBC: CPT

## 2022-12-27 PROCEDURE — 74011000258 HC RX REV CODE- 258: Performed by: INTERNAL MEDICINE

## 2022-12-27 PROCEDURE — 82962 GLUCOSE BLOOD TEST: CPT

## 2022-12-27 PROCEDURE — 97530 THERAPEUTIC ACTIVITIES: CPT

## 2022-12-27 PROCEDURE — 97535 SELF CARE MNGMENT TRAINING: CPT

## 2022-12-27 PROCEDURE — 74011250637 HC RX REV CODE- 250/637: Performed by: NURSE PRACTITIONER

## 2022-12-27 PROCEDURE — 74011250637 HC RX REV CODE- 250/637: Performed by: INTERNAL MEDICINE

## 2022-12-27 RX ADMIN — MEROPENEM 1 G: 1 INJECTION, POWDER, FOR SOLUTION INTRAVENOUS at 03:24

## 2022-12-27 RX ADMIN — SODIUM CHLORIDE, PRESERVATIVE FREE 10 ML: 5 INJECTION INTRAVENOUS at 05:17

## 2022-12-27 RX ADMIN — SODIUM CHLORIDE, PRESERVATIVE FREE 20 MG: 5 INJECTION INTRAVENOUS at 08:04

## 2022-12-27 RX ADMIN — LACTULOSE 30 ML: 20 SOLUTION ORAL at 08:04

## 2022-12-27 RX ADMIN — ENOXAPARIN SODIUM 70 MG: 100 INJECTION SUBCUTANEOUS at 08:04

## 2022-12-27 RX ADMIN — MEROPENEM 1 G: 1 INJECTION, POWDER, FOR SOLUTION INTRAVENOUS at 12:05

## 2022-12-27 RX ADMIN — LEVOTHYROXINE SODIUM 50 MCG: 0.05 TABLET ORAL at 05:16

## 2022-12-27 RX ADMIN — CASTOR OIL AND BALSAM, PERU: 788; 87 OINTMENT TOPICAL at 08:04

## 2022-12-27 RX ADMIN — METOPROLOL TARTRATE 12.5 MG: 25 TABLET, FILM COATED ORAL at 21:21

## 2022-12-27 RX ADMIN — Medication 1 AMPULE: at 09:31

## 2022-12-27 RX ADMIN — MEROPENEM 1 G: 1 INJECTION, POWDER, FOR SOLUTION INTRAVENOUS at 21:21

## 2022-12-27 RX ADMIN — SODIUM CHLORIDE, PRESERVATIVE FREE 10 ML: 5 INJECTION INTRAVENOUS at 21:22

## 2022-12-27 RX ADMIN — ENOXAPARIN SODIUM 70 MG: 100 INJECTION SUBCUTANEOUS at 21:21

## 2022-12-27 RX ADMIN — Medication 1 AMPULE: at 21:22

## 2022-12-27 RX ADMIN — CASTOR OIL AND BALSAM, PERU: 788; 87 OINTMENT TOPICAL at 21:23

## 2022-12-27 RX ADMIN — SODIUM CHLORIDE, PRESERVATIVE FREE 20 MG: 5 INJECTION INTRAVENOUS at 21:21

## 2022-12-27 RX ADMIN — METOPROLOL TARTRATE 12.5 MG: 25 TABLET, FILM COATED ORAL at 14:32

## 2022-12-27 RX ADMIN — LEVETIRACETAM 1000 MG: 100 INJECTION, SOLUTION, CONCENTRATE INTRAVENOUS at 00:04

## 2022-12-27 RX ADMIN — SODIUM CHLORIDE, PRESERVATIVE FREE 10 ML: 5 INJECTION INTRAVENOUS at 14:32

## 2022-12-27 RX ADMIN — LEVETIRACETAM 1000 MG: 100 INJECTION, SOLUTION, CONCENTRATE INTRAVENOUS at 12:05

## 2022-12-27 RX ADMIN — METOPROLOL TARTRATE 12.5 MG: 25 TABLET, FILM COATED ORAL at 05:16

## 2022-12-27 NOTE — PROGRESS NOTES
Palliative Medicine Consult  Beny: [phone] ([phone])    Patient Name: Oksana Dancer  YOB: 1965    Date of Initial Consult: 12/22/22  Reason for Consult: Care Decisions  Requesting Provider: Dr. Lucien Conway   Primary Care Physician: Eva Altamirano MD     SUMMARY:   Oksana Dancer is a 59 y.o. with Chronic nonischemic cardiomyopathy LVEF 15%, atrial flutter, MVR, dual chamber pacemaker, CVA with residual R-sided weakness and aphasia and EtOH cirrhosis. He was last admitted in 9/2022 with seizure thought secondary to noncompliance. He was admitted on 12/21/2022 from [facility] with a diagnosis of acute hypercapnic respiratory failure d/t status epilepticus. He is intubated, Sedation is to be weaned this morning. He is receiving antibiotics for possible right sided pneumonia, and there was concern for vomit in his airway prior to intubation. Current medical issues leading to Palliative Medicine involvement include: Care Decisions. Social History and Recent Events:  Per sister and Nick Shah: He had a CVA in 2012 and lived with Mary Arce after that until around 2019 went he went to a facility after experiencing a seizure. He has mostly been in facilities since this time. Mary Arce notes that her brother has a history \"of not taking his medications like he is supposed to. \" He has difficulty with speech at baseline d/t his history of stroke, though he can say a few words and communicate with gesturing. She says he is slow but active at baseline. He is able to walk and feed himself. He likes to watch tv. He visits with his sister Mary Arce sometimes, and she last saw him on Thanksgiving. She talks on the phone with her brother about every other day. She says he has depression in part because he is not able to interact with his family as much as he would like since he needs to live in a facility. She thinks this may have contributed to his not taking his medications as prescribed.      PALLIATIVE DIAGNOSES: Encounter for Palliative Care  Goals of Care discussion  Unresponsive/Impaired cognitive ability  Expressive aphasia at baseline  Physical Debility/Generalized weakness  Restlessness--has required restraints     PLAN:   Patient examined and chart reviewed. Patient was extubated on 12/25/2022. Appears to be back at his baseline. Neurology note from 12/25 reviewed   Called and spoke to [address], patient's sister and MPOA (per power of  document that included clause for medical decision making). She has been receiving updates on her brother's condition. Noted DNR order and ACP note written on 12/21/2022. I spoke to [address] about the DNR order and let her know this order just applies to this hospitalization. For a DNR outside of the hospital, patient would need an order. [address] does want DNR for her brother. I then asked about intubation for a situation as when he was admitted   She will talk to her sister Sri Baldwin and let me know  I did note patient has been in hospice but in no longer enrolled in hospice per the EMR     Addendum:  met with sister, [address], at 3:30. She has spoken to her sister, Sri Baldwin. They do not want CPR if patient has cardiac arrest but they would want the patient intubated for respiratory issues. Pablo signed POST order. Of note, patient much more responsive and interacting with his sister. He spoke a few words.        GOALS OF CARE / TREATMENT PREFERENCES:     GOALS OF CARE:  Patient/Health Care Proxy Stated Goals: Prolong life    TREATMENT PREFERENCES:   Code Status: DNR, OK for intubation for respiratory decline    Patient and family's personal goals include: patient to return to [facility] upcoming milestones or family events: none at this time     The patient identifies the following as important for living well: patient cannot state/participate d/t medical condition      Advance Care Planning:  [] The [facility] Interdisciplinary Team has updated the ACP Navigator with Health Care Decision Maker and Patient Capacity      Primary Decision Maker: Kiel Greenberg Sister - [phone]    Secondary Decision Maker: WagnerJohn PaulSurinder - Other Relative - [phone]    Supplemental (Other) Decision Maker: Les Kamara (niece) - Other Relative - [phone] AUTOFACT Planning 12/27/2022   Patient's Healthcare Decision Maker is: -   Primary Decision Maker Name -   Confirm Advance Directive -   Patient Would Like to Complete Advance Directive -   Does the patient have other document types MOST/MOLST/POST/POLST       Medical Interventions: Limited additional interventions       Other:    As far as possible, the palliative care team has discussed with patient / health care proxy about goals of care / treatment preferences for patient. HISTORY:     History obtained from: chart, RN, sister Royal Goodwin: patient cannot state/participate d/t medical condition    HPI/SUBJECTIVE:    The patient is:   [] Verbal and participatory  [x] Non-participatory due to: medical condition    Clinical Pain Assessment (nonverbal scale for severity on nonverbal patients):          Activity (Movement): Lying quietly, normal position    Duration: for how long has pt been experiencing pain (e.g., 2 days, 1 month, years)  Frequency: how often pain is an issue (e.g., several times per day, once every few days, constant)     FUNCTIONAL ASSESSMENT:     Palliative Performance Scale (PPS):  PPS: 30       PSYCHOSOCIAL/SPIRITUAL SCREENING:     Palliative IDT has assessed this patient for cultural preferences / practices and a referral made as appropriate to needs (Cultural Services, Patient Advocacy, Ethics, etc.)    Any spiritual / Yarsanism concerns:  [] Yes /  [x] No   If \"Yes\" to discuss with pastoral care during IDT     Does caregiver feel burdened by caring for their loved one:   [] Yes /  [] No /  [] No Caregiver Present/Available [] No Caregiver [x] Pt Lives at Facility  If \"Yes\" to discuss with social work during IDT    Anticipatory grief assessment:   [x] Normal  / [] Maladaptive     If \"Maladaptive\" to discuss with social work during IDT    ESAS Anxiety: Anxiety: 0    ESAS Depression:          REVIEW OF SYSTEMS:     Positive and pertinent negative findings in ROS are noted above in HPI. The following systems were [x] reviewed / [] unable to be reviewed as noted in HPI  Other findings are noted below. Systems: constitutional, ears/nose/mouth/throat, respiratory, gastrointestinal, genitourinary, musculoskeletal, integumentary, neurologic, psychiatric, endocrine. Positive findings noted below. Modified ESAS Completed by: provider   Fatigue:  (patient cannot state/participate d/t medical condition) Drowsiness: 0     Pain:  (patient cannot state/participate d/t medical condition)   Anxiety: 0       Dyspnea: 0     Constipation: No     Stool Occurrence(s): 1        PHYSICAL EXAM:     From RN flowsheet:  Wt Readings from Last 3 Encounters:   12/24/22 153 lb 10.6 oz (69.7 kg)   09/28/22 180 lb (81.6 kg)   09/09/22 177 lb 7.5 oz (80.5 kg)     Blood pressure (!) 112/59, pulse 70, temperature 98.1 °F (36.7 °C), resp. rate 16, height 5' 9\" (1.753 m), weight 153 lb 10.6 oz (69.7 kg), SpO2 100 %.     Pain Scale 1: Visual  Pain Intensity 1: 0                 Last bowel movement, if known: 12/26    General: lying in hospital bed, patient mumbles a few words,   Eyes: lids normal, no drainage  Nose: nares normal, no drainage  Mouth: mmm, no drainage  Pulm: respirations are unlabored, on room air   GI:  abdomen flat and soft   Neuro:  follows commands, looking around   Psych: calm without restlessness or agitation at this time  Skin:  MSK: not moving right arm but moves other extremities, right leg has some swelling        HISTORY:     Active Problems:    Status epilepticus ([address] (12/21/2022)    Past Medical History:   Diagnosis Date    Acid reflux     Alcohol abuse     Arthropathy, unspecified, site unspecified     Atrial fibrillation ([facility] [address]     Calcaneus fracture     Condyloma     Depression     Essential hypertension     H/O mitral valve repair 2006    Along with MAZE procedure    High cholesterol     History of angina     Hypertension     IHSS (idiopathic hypertrophic subaortic stenosis) ([facility])     Ill-defined condition     pacemaker    Impotence of organic origin     LA thrombus 2015    LA and ARNULFO thrombus    Pacemaker     Thyroid disease     Trichilemmal cyst 4/10/2017    Unspecified cerebral artery occlusion with cerebral infarction       Past Surgical History:   Procedure Laterality Date    HX APPENDECTOMY      HX CYST REMOVAL      HX HEART CATHETERIZATION      HX OTHER SURGICAL  2005    HX OTHER SURGICAL  2017    excision of scalp mass    HX PACEMAKER  2006    FL CARDIAC SURG PROCEDURE UNLIST  2006    Mitral Valve Replacement      No family history on file. History reviewed, no pertinent family history. Social History     Tobacco Use    Smoking status: Former     Types: Cigarettes     Quit date: 2020     Years since quittin.3    Smokeless tobacco: Never   Substance Use Topics    Alcohol use:  Yes     Alcohol/week: 0.0 standard drinks     Types: 1 - 2 Cans of beer per week     Comment: occ     Allergies   Allergen Reactions    Bactrim [Sulfamethoxazole-Trimethoprim] Other (comments)     GI Distress      Current Facility-Administered Medications   Medication Dose Route Frequency    lactulose (CHRONULAC) 10 gram/15 mL solution 30 mL  20 g Per NG tube DAILY    albuterol (PROVENTIL VENTOLIN) nebulizer solution 2.5 mg  2.5 mg Nebulization Q6H PRN    meropenem (MERREM) 1 g in 0.9% sodium chloride (MBP/ADV) 50 mL MBP  1 g IntraVENous Q8H    enoxaparin (LOVENOX) injection 70 mg  1 mg/kg SubCUTAneous Q12H    alcohol 62% (NOZIN) nasal  1 Ampule  1 Ampule Topical Q12H    balsam peru-castor oiL (VENELEX) ointment   Topical BID    chlorhexidine (ORAL CARE KIT) 0.12 % mouthwash 15 mL  15 mL Oral Q12H    levothyroxine (SYNTHROID) tablet 50 mcg  50 mcg Per NG tube 6am    metoprolol tartrate (LOPRESSOR) tablet 12.5 mg  12.5 mg Per NG tube Q8H    acetaminophen (TYLENOL) solution 650 mg  650 mg Per NG tube Q6H PRN    Or    acetaminophen (TYLENOL) suppository 650 mg  650 mg Rectal Q6H PRN    ondansetron (ZOFRAN ODT) tablet 4 mg  4 mg Per NG tube Q8H PRN    Or    ondansetron (ZOFRAN) injection 4 mg  4 mg IntraVENous Q6H PRN    polyethylene glycol (MIRALAX) packet 17 g  17 g Per NG tube DAILY PRN    midazolam (VERSED) injection 1 mg  1 mg IntraVENous Q2H PRN    sodium chloride (NS) flush 5-40 mL  5-40 mL IntraVENous Q8H    sodium chloride (NS) flush 5-40 mL  5-40 mL IntraVENous PRN    levETIRAcetam (KEPPRA) injection 1,000 mg  1,000 mg IntraVENous Q12H    insulin lispro (HUMALOG) injection   SubCUTAneous Q6H    glucose chewable tablet 16 g  4 Tablet Oral PRN    glucagon (GLUCAGEN) injection 1 mg  1 mg IntraMUSCular PRN    dextrose 10 % infusion 0-250 mL  0-250 mL IntraVENous PRN    famotidine (PF) (PEPCID) 20 mg in 0.9% sodium chloride 10 mL injection  20 mg IntraVENous Q12H          LAB AND IMAGING FINDINGS:     Lab Results   Component Value Date/Time    WBC 7.0 12/27/2022 04:25 AM    HGB 11.8 (L) 12/27/2022 04:25 AM    PLATELET 473 (L) 43/38/7451 04:25 AM     Lab Results   Component Value Date/Time    Sodium 145 12/27/2022 10:46 AM    Potassium 4.6 12/27/2022 10:46 AM    Chloride 114 (H) 12/27/2022 10:46 AM    CO2 25 12/27/2022 10:46 AM    BUN 24 (H) 12/27/2022 10:46 AM    Creatinine 1.05 12/27/2022 10:46 AM    Calcium 9.5 12/27/2022 10:46 AM    Magnesium 1.9 12/24/2022 10:36 PM    Phosphorus 2.6 12/24/2022 10:36 PM      Lab Results   Component Value Date/Time    Alk.  phosphatase 162 (H) 12/21/2022 01:46 PM    Protein, total 9.0 (H) 12/21/2022 01:46 PM    Albumin 4.0 12/21/2022 01:46 PM    Globulin 5.0 (H) 12/21/2022 01:46 PM     Lab Results   Component Value Date/Time    INR 2.1 (H) 09/05/2022 04:14 AM    Prothrombin time 21.3 (H) 09/05/2022 04:14 AM    aPTT 26.8 12/22/2022 01:16 AM    aPTT 34.1 06/25/2016 04:35 AM      Lab Results   Component Value Date/Time    Iron 63 08/13/2021 09:35 AM    TIBC 492 (H) 08/13/2021 09:35 AM    Iron % saturation 13 (L) 08/13/2021 09:35 AM    Ferritin 63 08/13/2021 09:35 AM      Lab Results   Component Value Date/Time    pH 7.46 (H) 12/21/2022 03:09 PM    PCO2 27 (L) 12/21/2022 03:09 PM    PO2 71 (L) 12/21/2022 03:09 PM     No components found for: [place]   Lab Results   Component Value Date/Time     (H) 09/05/2022 12:10 AM    CK - MB 1.7 12/16/2015 10:50 PM                Total time: 35 mins  Counseling / coordination time, spent as noted above: 30 mins  > 50% counseling / coordination?: yes    Prolonged service was provided for  []30 min   []75 min in face to face time in the presence of the patient, spent as noted above. Time Start:   Time End:   Note: this can only be billed with 53891 (initial) or 57319 (follow up). If multiple start / stop times, list each separately.

## 2022-12-27 NOTE — PROGRESS NOTES
Hospitalist Progress Note    NAME: Gerardo Blank   :  1965   MRN:  [id number]       Assessment / Plan:    The patient is 59years old man with past medical history significant for hypertension, A. 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 Patient was intubated on arrival for airway protection and was extubated in . Currently is on room air out of the ICU on . 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 We will switch to p.o. on discharge    Paroxysmal A. fib  History of chronic heart failure reduced ejection fraction  - Continue home metoprolol  - Currently on therapeutic dose of Lovenox, switch back to Xarelto once p.o. on discharge      Hypercapnic respiratory failure, resolved  Healthcare associated pneumonia, POA  - Intubated initially for airway protection  - Extubated on   - Currently continue to be on room air  - Started on IV antibiotics, continue to complete 7-day course    Hypothyroidism  - Continue home Synthroid    CKD stage III  - Creatinine baseline around 1.5    ESBL UTI  - Continue meropenem to complete total of 7 days, last day supposed to be   - to ask his facility if they can take midline to complete a 7 days of antibiotics        18.5 - 24.9 Normal weight / Body mass index is 22.69 kg/m².     Estimated discharge date:   Barriers: Clinical improvement, completion of IV antibiotics, last day  alternatively patient can be discharged back to his facility if they can take him back with midline    Code status: DNR  Prophylaxis: Lovenox  Recommended Disposition: SNF/LTC     Subjective:     Patient was seen and examined. No acute events overnight. Discussed with RN overnight events. Patient is aphasic, unable to complete review of other system              Objective:     VITALS:   Last 24hrs VS reviewed since prior progress note. Most recent are:  Patient Vitals for the past 24 hrs:   Temp Pulse Resp BP SpO2   12/27/22 0327 97.8 °F (36.6 °C) 70 18 108/67 95 %   12/27/22 0258 98.9 °F (37.2 °C) 76 18 121/80 97 %   12/27/22 0004 98.4 °F (36.9 °C) 71 19 125/75 98 %   12/26/22 2121 99.7 °F (37.6 °C) 70 19 128/71 97 %   12/26/22 1548 98.2 °F (36.8 °C) 70 20 122/78 99 %   12/26/22 1430 -- 70 25 -- 99 %   12/26/22 1400 -- 70 28 (!) 119/56 97 %   12/26/22 1300 -- 70 28 115/71 97 %   12/26/22 1200 98.3 °F (36.8 °C) 70 28 102/60 98 %   12/26/22 1100 -- 70 23 105/66 98 %         Intake/Output Summary (Last 24 hours) at 12/27/2022 1040  Last data filed at 12/27/2022 7849  Gross per 24 hour   Intake 651.67 ml   Output 600 ml   Net 51.67 ml          I had a face to face encounter and independently examined this patient on 12/27/2022, as outlined below:  PHYSICAL EXAM:  General: WD, WN. Alert, cooperative, no acute distress    EENT:  EOMI. Anicteric sclerae. MMM  Resp:  CTA bilaterally, no wheezing or rales. No accessory muscle use  CV:  Regular  rhythm,  No edema  GI:  Soft, Non distended, Non tender. +Bowel sounds  Neurologic:  EOMs intact. No facial asymmetry. Alert, cooperative, aphasic at baseline with right hemiparesis from previous CVA    Psych:   Not anxious nor agitated  Skin:  No rashes.   No jaundice    Reviewed most current lab test results and cultures  YES  Reviewed most current radiology test results   YES  Review and summation of old records today    NO  Reviewed patient's current orders and MAR    YES  PMH/SH reviewed - no change compared to H&P  ________________________________________________________________________  Care Plan discussed with:    Comments   Patient X    Family      RN X    Care Manager     Consultant                        Multidiciplinary team rounds were held today with , nursing, pharmacist and clinical coordinator. Patient's plan of care was discussed; medications were reviewed and discharge planning was addressed. ________________________________________________________________________        Comments   >50% of visit spent in counseling and coordination of care X    ________________________________________________________________________  Gabriela Aldrich MD     Procedures: see electronic medical records for all procedures/Xrays and details which were not copied into this note but were reviewed prior to creation of Plan. LABS:  I reviewed today's most current labs and imaging studies.   Pertinent labs include:  Recent Labs     12/27/22  0425 12/26/22 0321 12/25/22 0343   WBC 7.0 6.2 8.6   HGB 11.8* 10.1* 12.8   HCT 37.5 31.7* 40.3   * 118* 144*       Recent Labs     12/26/22  0321 12/25/22  0343 12/24/22  2236   * 144 144   K 3.1* 3.4* 3.6   * 114* 113*   CO2 22 24 25   GLU 88 99 113*   BUN 21* 23* 22*   CREA 0.78 1.30 1.27   CA 7.6* 9.4 9.3   MG  --   --  1.9   PHOS  --   --  2.6         Signed: Gabriela Aldrich MD

## 2022-12-27 NOTE — PROGRESS NOTES
Problem: Mobility Impaired (Adult and Pediatric)  Goal: *Acute Goals and Plan of Care (Insert Text)  Description: FUNCTIONAL STATUS PRIOR TO ADMISSION: Patient lives in [address] [place] [address]. At baseline, able to ambulate short distances with RW, transfer, and self propel w/c throughout facility. He requires some assistance with dressing and staff assists with bathing. Feeds himself. Sister reports increased right sided weakness 12/26. Physical Therapy Goals  Initiated 12/27/2022  1. Patient will move from supine to sit and sit to supine , scoot up and down, and roll side to side in bed with supervision/set-up within 7 day(s). 2.  Patient will transfer from bed to chair and chair to bed with supervision/set-up using the least restrictive device within 7 day(s). 3.  Patient will perform sit to stand with supervision/set-up within 7 day(s). 4.  Patient will ambulate with minimal assistance/contact guard assist for 20 feet with the least restrictive device within 7 day(s). Outcome: Progressing Towards Goal     PHYSICAL THERAPY EVALUATION  Patient: Erica Calderon (17 y.o. male)  Date: 12/27/2022  Primary Diagnosis: Status epilepticus ([facility] [G40.901]       Precautions: Fall, Contact, Droplet       ASSESSMENT  Patient with history of CVA with R sided weakness and aphasia admitted from SNF/LTC with seizures requiring intubation, extubated 12/25. Based on the objective data described below, the patient presents with R sided weakness, impaired balance, and impaired cognitive status/ability to follow commands. Does best with simple commands/gestures primarily of automatic tasks (sit up, stand up). Unclear if R sided weakness is at baseline or worse (yesterday sister reported it was worse per chart review). Patient unable to provide history but based on chart review, anticipate he is slightly below his baseline. Recommend discharge back to SNF/LTC. Will follow and progress as able while acutely admitted.     Current Level of Function Impacting Discharge (mobility/balance): moderate assist with rolling walker    Other factors to consider for discharge: LTC     Patient will benefit from skilled therapy intervention to address the above noted impairments. PLAN :  Recommendations and Planned Interventions: bed mobility training, transfer training, gait training, therapeutic exercises, neuromuscular re-education, patient and family training/education, and therapeutic activities      Frequency/Duration: Patient will be followed by physical therapy:  3 times a week to address goals.     Recommendation for discharge: (in order for the patient to meet his/her long term goals)  Return to SNF/LTC    This discharge recommendation:  Has not yet been discussed the attending provider and/or case management    IF patient discharges home will need the following DME: patient owns DME required for discharge     SUBJECTIVE:   Patient non-verbal    OBJECTIVE DATA SUMMARY:   HISTORY:    Past Medical History:   Diagnosis Date    Acid reflux     Alcohol abuse     Arthropathy, unspecified, site unspecified     Atrial fibrillation ([facility]     Calcaneus fracture     Condyloma     Depression     Essential hypertension     H/O mitral valve repair 2006    Along with MAZE procedure    High cholesterol     History of angina     Hypertension     IHSS (idiopathic hypertrophic subaortic stenosis) ([address]     Ill-defined condition     pacemaker    Impotence of organic origin     LA thrombus 05/2015    LA and ARNULFO thrombus    Pacemaker 2006    Thyroid disease     Trichilemmal cyst 4/10/2017    Unspecified cerebral artery occlusion with cerebral infarction      Past Surgical History:   Procedure Laterality Date    HX APPENDECTOMY      HX CYST REMOVAL      HX HEART CATHETERIZATION      HX OTHER SURGICAL  4/2005    HX OTHER SURGICAL  04/20/2017    excision of scalp mass    HX PACEMAKER  07/14/2006    [place] CARDIAC SURG PROCEDURE UNLIST  07/13/2006    Mitral Valve Replacement Personal factors and/or comorbidities impacting plan of care: prior CVA, HTN, ETOH history    Home Situation  Home Environment: Skilled nursing facility  One/Two Story Residence: Other (Comment)  Living Alone: No  Support Systems: [place]  Patient Expects to be Discharged to[de-identified] [address]  Current DME Used/Available at Home: None    EXAMINATION/PRESENTATION/DECISION MAKING:   Critical Behavior:  Neurologic State: Alert  Orientation Level: Unable to verbalize  Cognition: Decreased attention/concentration, Decreased command following (does best with simple commands, gestures)     Hearing: Auditory  Auditory Impairment: None  Skin: grossly intact  Range Of Motion:  AROM:  (baseline R sided weakness, generally decreased on L)                       Strength:    Strength:  (baseline R sided weakness, generally decreased on L)                    Tone & Sensation:                  Sensation:  (unable to formally assess)               Coordination:  Coordination: Generally decreased, functional  Vision:    Unable to assess due to patient's cognitive status  Functional Mobility:  Bed Mobility:     Supine to Sit: Minimum assistance; Moderate assistance; Additional time;Bed Modified     Scooting: Moderate assistance; Additional time  Transfers:  Sit to Stand: Minimum assistance; Moderate assistance; Additional time; Adaptive equipment (increased assist on second attempt)  Stand to Sit: Minimum assistance; Adaptive equipment (uncontrolled descent)        Bed to Chair: Moderate assistance; Adaptive equipment              Balance:   Sitting: Impaired  Sitting - Static: Good (unsupported)  Sitting - Dynamic: Good (unsupported); Fair (occasional)  Standing: Impaired  Standing - Static: Fair;Constant support  Standing - Dynamic : Fair;Constant support         Physical Therapy Evaluation Charge Determination   History Examination Presentation Decision-Making   HIGH Complexity :3+ comorbidities / personal factors will impact the outcome/ POC  LOW Complexity : 1-2 Standardized tests and measures addressing body structure, function, activity limitation and / or participation in recreation  LOW Complexity : Stable, uncomplicated  LOW Complexity : FOTO score of       Based on the above components, the patient evaluation is determined to be of the following complexity level: LOW     Pain Rating:  Did not interfere, patient unable to verbalize     Activity Tolerance:   Fair    After treatment patient left in no apparent distress:   Sitting in chair, Call bell within reach, and Bed / chair alarm activated    COMMUNICATION/EDUCATION:   The patients plan of care was discussed with: Occupational therapist, Registered nurse, and Certified nursing assistant/patient care technician. Patient is unable to participate in goal setting and plan of care.     Thank you for this referral.  Sai Barbosa, PT   Time Calculation: 19 mins

## 2022-12-27 NOTE — ACP (ADVANCE CARE PLANNING)
Met today with the patient's sister, Yenifer Ghosh to talk further about DNR order. We reviewed that the order is in place for this hospitalization. Trevon Oakley does want DNR for after he is discharged as well. We also talked about intubation. Trevon Oakley talked to her sister Ita Figueroa  They do want the patient intubated for respiratory issues.      POST order signed to state DNR for cardiac arrest and full interventions otherwise

## 2022-12-27 NOTE — PROGRESS NOTES
Bedside and Verbal shift change report given to Rafi junior RN (oncoming nurse) by Selena Randall RN (offgoing nurse). Report included the following information SBAR, Kardex, Intake/Output, MAR, and Recent Results.

## 2022-12-27 NOTE — PROGRESS NOTES
Problem: Falls - Risk of  Goal: *Absence of Falls  Description: Document Sofia Fothergill Fall Risk and appropriate interventions in the flowsheet. Outcome: Progressing Towards Goal  Note: Fall Risk Interventions:  Mobility Interventions: Bed/chair exit alarm, Communicate number of staff needed for ambulation/transfer, PT Consult for mobility concerns, Strengthening exercises (ROM-active/passive)    Mentation Interventions: Adequate sleep, hydration, pain control, Bed/chair exit alarm, Door open when patient unattended, Evaluate medications/consider consulting pharmacy, Reorient patient, Toileting rounds, Update white board    Medication Interventions: Bed/chair exit alarm, Evaluate medications/consider consulting pharmacy    Elimination Interventions: Bed/chair exit alarm, Call light in reach, Patient to call for help with toileting needs, Toileting schedule/hourly rounds    History of Falls Interventions: Bed/chair exit alarm, Consult care management for discharge planning, Door open when patient unattended, Evaluate medications/consider consulting pharmacy, Assess for delayed presentation/identification of injury for 48 hrs (comment for end date)         Problem: Pressure Injury - Risk of  Goal: *Prevention of pressure injury  Description: Document Ralph Scale and appropriate interventions in the flowsheet.   Outcome: Progressing Towards Goal  Note: Pressure Injury Interventions:  Sensory Interventions: Assess need for specialty bed, Check visual cues for pain, Float heels, Keep linens dry and wrinkle-free, Minimize linen layers, Monitor skin under medical devices    Moisture Interventions: Apply protective barrier, creams and emollients, Internal/External urinary devices, Minimize layers    Activity Interventions: Assess need for specialty bed, Increase time out of bed, PT/OT evaluation    Mobility Interventions: Chair cushion, Float heels, HOB 30 degrees or less, PT/OT evaluation    Nutrition Interventions: Offer support with meals,snacks and hydration    Friction and Shear Interventions: Apply protective barrier, creams and emollients, Feet elevated on foot rest, HOB 30 degrees or less, Lift sheet, Lift team/patient mobility team, Transferring/repositioning devices

## 2022-12-27 NOTE — PROGRESS NOTES
Problem: Self Care Deficits Care Plan (Adult)  Goal: *Acute Goals and Plan of Care (Insert Text)  Description: FUNCTIONAL STATUS PRIOR TO ADMISSION: Pt non-verbal; therefore, true PLOF undetermined; however, chart indicates pt resides at [address] [place] [address] with Supervision for transfers at [address] [place] [address], Mod Indep with W/C propulsion and Mod A for ADL completion. HOME SUPPORT: The patient lived with LTC staffing to provide Moderate ADL assist.    Occupational Therapy Goals  Initiated 12/27/2022  1. Patient will perform EOB bathing with moderate assistance  within 7 day(s). 2.  Patient will perform EOB/RW lower body dressing with moderate assistance  within 7 day(s). 3.  Patient will perform RW toilet transfers with supervision/set-up within 7 day(s). 4.  Patient will perform CM aspects of RW toileting with moderate assistance within 7 day(s). Outcome: Not Met     OCCUPATIONAL THERAPY EVALUATION  Patient: Ema Posadas (50 y.o. male)  Date: 12/27/2022  Primary Diagnosis: Status epilepticus ([facility] [G40.901]       Precautions: Falls       ASSESSMENT  Based on the objective data described below, the patient presents with decreased mobility/balance, decreased strength/endurance, decreased activity tolerance, non-verbal and decreased R hemispheric functionality, all of which limit pt's ability to safely complete self-care routine. Currently, pt benefits from S/U and Supervision for self-feeding, Min A for seated grooming, Mod A for UB dressing and Max A for LE dressing/bathing/toileting. Given pt non-verbal true PLOF undetermined, with chart indicating pt able to ambulate with RW short distances at Supervision, requiring Mod A for ADL completion. Pt noted to benefit from Mod Ax1 and Min Ax1 for sit to stand from EOB and Mod Ax1 to pivot to armchair. Pt noted to be pleasant during evaluation/treatment.   Pt benefits from skilled OT during acute hospitalization, with reporting therapist believing pt would benefit from return to SNF/LTC with rehab services. Current Level of Function Impacting Discharge (ADLs/self-care): S/U and Supervision for self-feeding, Min A for seated grooming, Mod A for UB dressing and Max A for LE dressing/bathing/toileting    Functional Outcome Measure: The patient scored /100 on the Barthel Index outcome measure. Other factors to consider for discharge: non-verbal     Patient will benefit from skilled therapy intervention to address the above noted impairments. PLAN :  Recommendations and Planned Interventions: self care training, functional mobility training, therapeutic exercise, balance training, therapeutic activities, endurance activities, and patient education    Frequency/Duration: Patient will be followed by occupational therapy 2 times a week to address goals.     Recommendation for discharge: (in order for the patient to meet his/her long term goals)  Therapy up to 5 days/week in SNF setting    This discharge recommendation:  Has not yet been discussed the attending provider and/or case management    IF patient discharges home will need the following DME: patient owns DME required for discharge       SUBJECTIVE:   Patient non-verbal    OBJECTIVE DATA SUMMARY:   HISTORY:   Past Medical History:   Diagnosis Date    Acid reflux     Alcohol abuse     Arthropathy, unspecified, site unspecified     Atrial fibrillation ([address]     Calcaneus fracture     Condyloma     Depression     Essential hypertension     H/O mitral valve repair 2006    Along with MAZE procedure    High cholesterol     History of angina     Hypertension     IHSS (idiopathic hypertrophic subaortic stenosis) ([address]     Ill-defined condition     pacemaker    Impotence of organic origin     LA thrombus 05/2015    LA and ARNULFO thrombus    Pacemaker 2006    Thyroid disease     Trichilemmal cyst 4/10/2017    Unspecified cerebral artery occlusion with cerebral infarction      Past Surgical History:   Procedure Laterality Date    HX APPENDECTOMY      HX CYST REMOVAL      HX HEART CATHETERIZATION      HX OTHER SURGICAL  4/2005    HX OTHER SURGICAL  04/20/2017    excision of scalp mass    HX PACEMAKER  07/14/2006    [place] CARDIAC SURG PROCEDURE UNLIST  07/13/2006    Mitral Valve Replacement       Expanded or extensive additional review of patient history:     Home Situation  Home Environment: Skilled nursing facility  One/Two Story Residence: Other (Comment)  Living Alone: No  Support Systems: [place]  Patient Expects to be Discharged to[de-identified] [address]  Current DME Used/Available at Home: None  Tub or Shower Type: Shower    EXAMINATION OF PERFORMANCE DEFICITS:  Cognitive/Behavioral Status:  Neurologic State: Alert  Orientation Level: Unable to verbalize  Cognition: Decreased attention/concentration;Decreased command following (does best with simple commands, gestures)  Perception: Cues to attend to right side of body  Perseveration: No perseveration noted  Safety/Judgement: Fall prevention    Hearing: Auditory  Auditory Impairment: None    Range of Motion:  AROM:  (baseline R sided weakness, generally decreased on L)                         Strength:  Strength:  (baseline R sided weakness, generally decreased on L)                Coordination:  Coordination: Generally decreased, functional  Fine Motor Skills-Upper: Left Intact; Right Impaired    Gross Motor Skills-Upper: Left Intact; Right Impaired    Tone & Sensation:  Sensation:  (unable to formally assess)    Balance:  Sitting: Impaired  Sitting - Static: Good (unsupported)  Sitting - Dynamic: Good (unsupported); Fair (occasional)  Standing: Impaired  Standing - Static: Fair;Constant support  Standing - Dynamic : Fair;Constant support    Functional Mobility and Transfers for ADLs:  Bed Mobility:  Supine to Sit: Minimum assistance; Moderate assistance; Additional time;Bed Modified  Scooting: Moderate assistance; Additional time    Transfers:  Sit to Stand: Minimum assistance; Moderate assistance; Additional time; Adaptive equipment (increased assist on second attempt)  Stand to Sit: Minimum assistance; Adaptive equipment (uncontrolled descent)  Bed to Chair: Moderate assistance; Adaptive equipment    ADL Assessment:  Feeding: Setup;Supervision    Oral Facial Hygiene/Grooming: Minimum assistance    Bathing: Maximum assistance    Upper Body Dressing: Moderate assistance    Lower Body Dressing: Maximum assistance    Toileting: Maximum assistance    ADL Intervention and task modifications:  Pt educated on safe transfer techniques, with specific emphasis on proper hand placement to push up from seated surface rather than attempt to pull self up, fully positioning self in-front of desired seated location, feeling chair on back of legs and reaching back with 1-2 UE to slowly lower self to seated position. Patient instructed and indicated understanding the benefits of maintaining activity tolerance, functional mobility, and independence with self care tasks during acute stay  to ensure safe return home and to baseline. Encouraged patient to increase frequency and duration OOB, be out of bed for all meals, perform daily ADLs (as approved by RN/MD regarding bathing etc), and performing functional mobility to/from [facility]. Cognitive Retraining  Safety/Judgement: Fall prevention    Functional Measure:    Barthel Index:  Bathin  Bladder: 5  Bowels: 5  Groomin  Dressin  Feedin  Mobility: 0  Stairs: 0  Toilet Use: 5  Transfer (Bed to Chair and Back): 10  Total: 35/100      The Barthel ADL Index: Guidelines  1. The index should be used as a record of what a patient does, not as a record of what a patient could do. 2. The main aim is to establish degree of independence from any help, physical or verbal, however minor and for whatever reason. 3. The need for supervision renders the patient not independent. 4. A patient's performance should be established using the best available evidence.  Asking the patient, friends/relatives and nurses are the usual sources, but direct observation and common sense are also important. However direct testing is not needed. 5. Usually the patient's performance over the preceding 24-48 hours is important, but occasionally longer periods will be relevant. 6. Middle categories imply that the patient supplies over 50 per cent of the effort. 7. Use of aids to be independent is allowed. Score Interpretation (from [facility])    Independent   60-79 Minimally independent   40-59 Partially dependent   20-39 Very dependent   <20 Totally dependent     -Colton Garcia., Barthel, D.W. (1965). Functional evaluation: the Barthel Index. [address] ([address] (1997). The Barthel activities of daily living index: self-reporting versus actual performance in the old (> or = 75 years). Journal of [address] [place] [address] 45(7), [facility], LUBNA, Bakari Contreras., Martin Goldstein. (1999). Measuring the change in disability after inpatient rehabilitation; comparison of the responsiveness of the Barthel Index and Functional [place] Measure. Journal of Neurology, Neurosurgery, and Psychiatry, 66(4), 342-681. Areli Brandt, N.J.A, SHAKIR Morales.DEEDEE, & Bianka Nation, M.A. (2004) Assessment of post-stroke quality of life in cost-effectiveness studies: The usefulness of the Barthel Index and the EuroQoL-5D.  Quality of Life Research, 15, 116-88        Occupational Therapy Evaluation Charge Determination   History Examination Decision-Making   LOW Complexity : Brief history review  HIGH Complexity : 5 or more performance deficits relating to physical, cognitive , or psychosocial skils that result in activity limitations and / or participation restrictions LOW Complexity : No comorbidities that affect functional and no verbal or physical assistance needed to complete eval tasks       Based on the above components, the patient evaluation is determined to be of the following complexity level: LOW   Pain Rating:  No c/o pain    Activity Tolerance:   Good, Fair, and requires rest breaks    After treatment patient left in no apparent distress:    Sitting in chair, Call bell within reach, Bed / chair alarm activated, and RN notified and following    COMMUNICATION/EDUCATION:   The patients plan of care was discussed with: Physical therapist and Registered nurse. Patient is unable to participate in goal setting and plan of care. This patients plan of care is appropriate for delegation to DONNELL.     Thank you for this referral.  Trinh Villalba, OT  Time Calculation: 17 mins

## 2022-12-27 NOTE — HOSPICE
[facility] RN note:  consult noted. Discussed pt with ALLAN Mobley. T/C to sister Reynaldo Beaulieu [phone] with sister Cassy Stewart [phone] later joining the phone call. Pt previously on hospice, family not able to remember which agency but OK with [id number] [employer] getting involved. They want pt to return to [place] with hospice support to prevent future hospitalizations. [facility] to investigate medicare benefit period, may need a F2F to confirm appropriateness for hospice. Liaison to follow up tomorrow 12/28. Thank you for the opportunity to care for this pt and family. Please contact hospice at [phone] with any questions or concerns.

## 2022-12-27 NOTE — PROGRESS NOTES
Transition of Care Plan:    RUR: 16%  Disposition: return to [address] [place] [address] (hospice consult pending)  If SNF or IPR: Date FOC offered:  Date FOC received:  Date authorization started with reference number:  Date authorization received and expires:  Accepting facility:  Follow up appointments: N/A  DME needed: available at facility   Transportation at Discharge: medical transport   [address] or means to access home:  yes      IM Medicare Letter: to be reviewed prior to d/c   Is patient a  and connected with the [place]? No          If yes, was [place] transfer form completed and [facility] notified? Caregiver Contact:  Yessenia Won (sister)  Discharge Caregiver contacted prior to discharge? yes  Care Conference needed?:  No    Chart reviewed. Acknowledged hospice consult. Call placed to pt's sister to discuss disposition. Spoke with sisters Elton Durán and Sheeba Linton. They are both in agreement with pt returning to [facility] for LTC placement. They both expressed interest in hospice services and no preference indicated at this time. [facility]. Pending response. CM submitted referral to [facility] via CC link. Call placed to facility and informed that no one was working in admissions department today to confirm pt's return. Will continue to follow.     GENNA Ellsworth   Care Manager, [facility]

## 2022-12-28 VITALS
DIASTOLIC BLOOD PRESSURE: 68 MMHG | HEIGHT: 69 IN | TEMPERATURE: 98.1 F | HEART RATE: 69 BPM | OXYGEN SATURATION: 100 % | RESPIRATION RATE: 16 BRPM | WEIGHT: 153.66 LBS | SYSTOLIC BLOOD PRESSURE: 119 MMHG | BODY MASS INDEX: 22.76 KG/M2

## 2022-12-28 LAB
ANION GAP SERPL CALC-SCNC: 7 MMOL/L (ref 5–15)
BUN SERPL-MCNC: 25 MG/DL (ref 6–20)
BUN/CREAT SERPL: 23 (ref 12–20)
CALCIUM SERPL-MCNC: 9.7 MG/DL (ref 8.5–10.1)
CHLORIDE SERPL-SCNC: 113 MMOL/L (ref 97–108)
CO2 SERPL-SCNC: 25 MMOL/L (ref 21–32)
CREAT SERPL-MCNC: 1.09 MG/DL (ref 0.7–1.3)
GLUCOSE BLD STRIP.AUTO-MCNC: 112 MG/DL (ref 65–117)
GLUCOSE BLD STRIP.AUTO-MCNC: 96 MG/DL (ref 65–117)
GLUCOSE SERPL-MCNC: 106 MG/DL (ref 65–100)
POTASSIUM SERPL-SCNC: 3.8 MMOL/L (ref 3.5–5.1)
SERVICE CMNT-IMP: NORMAL
SERVICE CMNT-IMP: NORMAL
SODIUM SERPL-SCNC: 145 MMOL/L (ref 136–145)

## 2022-12-28 PROCEDURE — 76937 US GUIDE VASCULAR ACCESS: CPT

## 2022-12-28 PROCEDURE — 74011000250 HC RX REV CODE- 250: Performed by: INTERNAL MEDICINE

## 2022-12-28 PROCEDURE — 74011250636 HC RX REV CODE- 250/636: Performed by: INTERNAL MEDICINE

## 2022-12-28 PROCEDURE — 74011000258 HC RX REV CODE- 258: Performed by: INTERNAL MEDICINE

## 2022-12-28 PROCEDURE — 77030018786 HC NDL GD F/USND BARD -B

## 2022-12-28 PROCEDURE — 82962 GLUCOSE BLOOD TEST: CPT

## 2022-12-28 PROCEDURE — 74011250636 HC RX REV CODE- 250/636: Performed by: HOSPITALIST

## 2022-12-28 PROCEDURE — 80048 BASIC METABOLIC PNL TOTAL CA: CPT

## 2022-12-28 PROCEDURE — 74011250637 HC RX REV CODE- 250/637: Performed by: ANESTHESIOLOGY

## 2022-12-28 PROCEDURE — 74011250637 HC RX REV CODE- 250/637: Performed by: INTERNAL MEDICINE

## 2022-12-28 PROCEDURE — 74011250637 HC RX REV CODE- 250/637: Performed by: NURSE PRACTITIONER

## 2022-12-28 PROCEDURE — 36415 COLL VENOUS BLD VENIPUNCTURE: CPT

## 2022-12-28 PROCEDURE — C1751 CATH, INF, PER/CENT/MIDLINE: HCPCS

## 2022-12-28 RX ORDER — METOPROLOL SUCCINATE 25 MG/1
0.5 TABLET, EXTENDED RELEASE ORAL 2 TIMES DAILY
COMMUNITY

## 2022-12-28 RX ORDER — LACTULOSE 10 G/15ML
SOLUTION ORAL; RECTAL
COMMUNITY
Start: 2022-09-27

## 2022-12-28 RX ORDER — LEVOTHYROXINE SODIUM 50 UG/1
50 TABLET ORAL
Qty: 30 TABLET | Refills: 0 | Status: SHIPPED
Start: 2022-12-28

## 2022-12-28 RX ORDER — LEVETIRACETAM 1000 MG/1
1000 TABLET ORAL 2 TIMES DAILY
Qty: 60 TABLET | Refills: 0 | Status: SHIPPED
Start: 2022-12-28

## 2022-12-28 RX ADMIN — SODIUM CHLORIDE, PRESERVATIVE FREE 10 ML: 5 INJECTION INTRAVENOUS at 13:47

## 2022-12-28 RX ADMIN — LEVETIRACETAM 1000 MG: 100 INJECTION, SOLUTION, CONCENTRATE INTRAVENOUS at 13:41

## 2022-12-28 RX ADMIN — METOPROLOL TARTRATE 12.5 MG: 25 TABLET, FILM COATED ORAL at 13:45

## 2022-12-28 RX ADMIN — CHLORHEXIDINE GLUCONATE 15 ML: 1.2 RINSE ORAL at 09:00

## 2022-12-28 RX ADMIN — LEVOTHYROXINE SODIUM 50 MCG: 0.05 TABLET ORAL at 07:11

## 2022-12-28 RX ADMIN — SODIUM CHLORIDE, PRESERVATIVE FREE 20 MG: 5 INJECTION INTRAVENOUS at 09:15

## 2022-12-28 RX ADMIN — Medication 1 AMPULE: at 09:24

## 2022-12-28 RX ADMIN — MEROPENEM 1 G: 1 INJECTION, POWDER, FOR SOLUTION INTRAVENOUS at 04:31

## 2022-12-28 RX ADMIN — LEVETIRACETAM 1000 MG: 100 INJECTION, SOLUTION, CONCENTRATE INTRAVENOUS at 00:24

## 2022-12-28 RX ADMIN — MEROPENEM 1 G: 1 INJECTION, POWDER, FOR SOLUTION INTRAVENOUS at 11:48

## 2022-12-28 RX ADMIN — LACTULOSE 30 ML: 20 SOLUTION ORAL at 09:15

## 2022-12-28 RX ADMIN — METOPROLOL TARTRATE 12.5 MG: 25 TABLET, FILM COATED ORAL at 07:10

## 2022-12-28 RX ADMIN — CASTOR OIL AND BALSAM, PERU: 788; 87 OINTMENT TOPICAL at 09:26

## 2022-12-28 RX ADMIN — ENOXAPARIN SODIUM 70 MG: 100 INJECTION SUBCUTANEOUS at 09:15

## 2022-12-28 RX ADMIN — SODIUM CHLORIDE, PRESERVATIVE FREE 10 ML: 5 INJECTION INTRAVENOUS at 07:11

## 2022-12-28 NOTE — DISCHARGE SUMMARY
Hospitalist Discharge Summary     Patient ID:  Wayne Lincoln  [id number]  71 y.o.  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 fib  History of chronic heart failure reduced ejection fraction  ESBL UTI POA- complete IV Meropenem till 12/31/2022 via Midline, then remove it  Hypothyroidism- Continue home Synthroid  Hypercapnic respiratory failure, resolved  Healthcare associated pneumonia, POA  DNR    CONSULTATIONS:  IP CONSULT TO NEUROLOGY  IP CONSULT TO PALLIATIVE CARE - PROVIDER    Excerpted HPI from H&P of Rosemary Logan MD:  \"59 year old male from [facility] intubated for hypercapnic respiratory failure secondary to status epilepticus. He has a known history of seizure disorder. He also has complex medical history which includes, Chronic nonischemic cardiomyopathy LVEF 15%, with Bi V failure, Chronic atrial flutter with prior surgical MAZE in 2006. He had recurrence of atrial flutter in 6/2022 which has sustained, MVR, dual chamber pacemaker, CVA with residual R-sided weakness and aphasia and EtOH cirrhosis. He was last admitted in 9/2022 with seizure thought secondary to noncompliance  Today, there was concern for vomit in his airway and cxr with possible infiltrate. He was started on Vanc and unasyn in the ER. .  \"    ______________________________________________________________________  DISCHARGE SUMMARY/HOSPITAL COURSE:  for full details see H&P, daily progress notes, labs, consult notes.      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 We will switch to p.o. on discharge    Paroxysmal ZELDA harvey  History of chronic heart failure reduced ejection fraction  - Continue home metoprolol  - Currently on therapeutic dose of Lovenox, switch back to Xarelto once p.o. on discharge       Hypercapnic respiratory failure, resolved  Healthcare associated pneumonia, POA  - Intubated initially for airway protection  - Extubated on December 26  - Currently continue to be on room air  - Started on IV antibiotics, continue to complete 7-day course     Hypothyroidism  - Continue home Synthroid    CKD stage III  - Creatinine baseline around 1.5    ESBL UTI  - Continue meropenem to complete total of 7 days, last day supposed to be Friday, December 31  - to ask his facility if they can take midline to complete a 7 days of antibiotics          18.5 - 24.9 Normal weight / Body mass index is 22.69 kg/m². Estimated discharge date: December 28  Barriers: Clinical improvement, completion of IV antibiotics, last day December 31 alternatively patient can be discharged back to his facility if they can take him back with midline     Code status: DNR        _______________________________________________________________________  Patient seen and examined by me on discharge day. Pertinent Findings:  Gen:    Not in distress  Chest: Clear lungs  CVS:   Regular rhythm. No edema  Abd:  Soft, not distended, not tender  Neuro:  Alert, oriented x 3  _______________________________________________________________________  DISCHARGE MEDICATIONS:   Current Discharge Medication List        START taking these medications    Details   levothyroxine (SYNTHROID) 50 mcg tablet Take 1 Tablet by mouth Daily (before breakfast). Qty: 30 Tablet, Refills: 0  Start date: 12/28/2022      meropenem 1 g IVPB 1 g by IntraVENous route every eight (8) hours for 3 days.   Qty: 9 Dose, Refills: 0  Start date: 12/28/2022, End date: 12/31/2022 CONTINUE these medications which have CHANGED    Details   levETIRAcetam 1,000 mg tablet Take 1 Tablet by mouth two (2) times a day. Qty: 60 Tablet, Refills: 0  Start date: 12/28/2022           CONTINUE these medications which have NOT CHANGED    Details   !! LORazepam (LORazepam IntensoL) 2 mg/mL concentrated solution Take 0.5 mg by mouth every six (6) hours. !! LORazepam (LORazepam IntensoL) 2 mg/mL concentrated solution Take 0.5 mg by mouth every six (6) hours as needed for Anxiety. morphine sulfate (MORPHINE CONCENTRATE 20 MG/ML ORAL SOLUTION, NDG ONLY,) Take 5 mg by mouth every four (4) hours as needed for Pain. furosemide (LASIX) 40 mg tablet Take 40 mg by mouth daily. FLUoxetine (PROzac) 40 mg capsule Take 40 mg by mouth nightly. metoprolol succinate (TOPROL-XL) 25 mg XL tablet Take 0.5 mg by mouth two (2) times a day. lactulose (CHRONULAC) 10 gram/15 mL solution       acetaminophen (TYLENOL) 500 mg tablet Take  by mouth every eight (8) hours as needed for Pain. !! - Potential duplicate medications found. Please discuss with provider. STOP taking these medications       traMADoL (ULTRAM) 50 mg tablet Comments:   Reason for Stopping:                 Patient Follow Up Instructions:    Activity: Activity as tolerated  Diet: Cardiac Diet and Low fat, Low cholesterol      Follow-up with PCP in 2 weeks, neurology as needed    Follow-up Information       Follow up With Specialties Details Why Contact Info    Jacinto Hicks MD Family Medicine   [address] [place] [address]  Jonnathan Castellon 1999  [phone]            ________________________________________________________________    Risk of deterioration: Moderate    Condition at Discharge:  Stable  __________________________________________________________________    Disposition  SNF/LTC    ____________________________________________________________________    Code Status: DNR/DNI  ___________________________________________________________________      Total time in minutes spent coordinating this discharge (includes going over instructions, follow-up, prescriptions, and preparing report for sign off to her PCP) :  35 minutes    Signed:  Ramsey Rower, MD

## 2022-12-28 NOTE — PROGRESS NOTES
Nursing: For any issues with transportation, please contact [facility] at [phone]. Patient is clear from a CM standpoint. Transition of Care Plan: Return to [place] SNF/LTC. RUR: 15% (moderate)  Disposition: Return to [address] [place] [address]. If SNF or IPR: Date FOC offered: N/A  Date FOC received: N/A  Date authorization started with reference number: N/A  Date authorization received and expires: N/A  Accepting facility: N/A  Follow up appointments: defer to facility. DME needed: available at facility   Transportation at Discharge: [facility] at 5pm, to [place] SNF/LTC, nursing please call report to: [phone]. Nursing please notify patient's sister or let facility know to notify sister when patient arrives to [place]. [place] or means to access home: Facility has access.  Medicare Letter: 2nd IM received 12/28/2022. Is patient a  and connected with the [place]? No          If yes, was [place] transfer form completed and VA notified? N/A  Caregiver Contact:  Digna Crowley (sister) - [phone]. Discharge Caregiver contacted prior to discharge? Yes. Care Conference needed?:  No     [facility] been in touch with family. Per Hospice's note, they will investigate Medicare benefit period and may need a F2F to confirm appropriateness for hospice. Referral was sent via cclink to [facility] regarding patient's return. CM will continue to follow for any discharge needs. Patient and family does not want hospice care at this time. [facility]'s DON Pearl Jennings) confirmed that patient is able to return today. CM spoke with patient who is agreeable to transfer back to [facility] today or tomorrow. 2nd IM signed. CM received call from Dr. Paul Martini who said patient is needing IV abx prior to discharge and whether or not [facility] could take patient with midline.   CM contacted [facility] and requested to speak with admissions,  confirmed they were out and I would need to speak with MADAI. Deanna (DON) confirmed patient would return today with 5pm transport with meropenem and midline. She said patient could return to previous room. ALLAN notified Dr. Isabell May that patient could transfer to [place] with midline and meropenem. CM contacted patient's sister who is agreeable to discharge plan to [place] today and had no further questions or concerns for CM. [facility] is able to take patient at 5pm.  [facility] paperwork placed on chart. Patient notified of discharge plan at 5pm and had no further questions or concerns for CM. ALLAN spoke again with Deanna ([address]) at [place], regarding discharge today at [facility] [facility] who transferred call to [facility] () who confirmed patient could be discharged back at 5pm with meropenem and midline. Transition of Care Plan to SNF/Rehab    Communication to Patient/Family:  Met with patient and family and they are agreeable to the transition plan. The Plan for Transition of Care is related to the following treatment goals: IV abx/LTC. The Patient and/or patient representative was provided with a choice of provider and agrees  with the discharge plan. Yes [x] No []    A Freedom of choice list was provided with basic dialogue that supports the patient's individualized plan of care/goals and shares the quality data associated with the providers. Yes [x] No []    SNF/Rehab Transition:  Patient has been accepted to [facility] SN[facility]/Rehab and meets criteria for admission. Patient will transported by [facility] and expected to leave at 5:00 PM.    Communication to SNF/Rehab:  Bedside RN, Nile Ascencio, has been notified to update the transition plan to the facility and call report (phone number). Discharge information has been updated on the AVS. And communicated to facility via [employer], or CC link. Discharge instructions to be sent with patient via paper copy.     Nursing Please include all hard scripts for controlled substances, med rec and dc summary, and AVS in packet. Reviewed and confirmed with facility, [facility], can manage the patient care needs for the following:     Adriane Bah with (X) only those applicable:  Medication:  [x]Medications are available at the facility  [x]IV Antibiotics    []Controlled Substance - hard copies available sent. []Weekly Labs    Equipment:  []CPAP/BiPAP  []Wound Vacuum  []Aviles or Urinary Device  []PICC/Central Line  []Nebulizer  []Ventilator    Treatment:  [x]Isolation (ESBL)  []Surgical Drain Management  []Tracheostomy Care  []Dressing Changes  []Dialysis with transportation  []PEG Care  []Oxygen  []Daily Weights for Heart Failure    Dietary:  []Any diet limitations  []Tube Feedings   []Total Parenteral Management (TPN)    Financial Resources:  []Medicaid Application Completed    []UAI Completed and copy given to pt/family  and copy given to pt/family  []A screening has previously been completed. []Level II Completed    [] Private pay individual who will not become   financially eligible for Medicaid within 6 months from admission to a [address] [place] [address] facility. [] Individual refused to have screening conducted. []Medicaid Application Completed    []The screening denied because it was determined individual did not need/did not qualify for nursing facility level of care. [] Out of state residents seeking direct admission to a [address] facility.   [] Individuals who are inpatients of an out of state hospital, or in state or out of state veterans/ hospital and seek direct admission to a [address] facility  [] Individuals who are pateints or residents of a state owned/operated facility that is licensed by [employer] ([employer]) and seek direct admission to [address] [place] [address]  [] A screening not required for enrollment in [facility] services as set out in [facility] 26-  [] [facility] staff shall perform screenings of the [facility] clients. Advanced Care Plan:  []Surrogate Decision Maker of Care  []POA  []Communicated Code Status and copy sent.     Other:                JEANNINE SharpeN, RN    Care Management  [phone]

## 2022-12-28 NOTE — PROGRESS NOTES
Midline insertion procedure note:  Pt has very limited vascular access. Procedure explained to patient along with risks and benefits. Procedure teaching completed. Pre procedure assessment done. Patient denies questions or concerns at this time. Maximum sterile barrier precautions observed throughout procedure. Lidocaine 1% 3ml sc injected to site prior to access the vein. Cannulated Basilic vein using ultrasound guidance. Inserted 4 Fr. single lumen midline to LEFT arm. Blood return verified and flushed with 20ml normal saline. Sterile dressing applied with Biopatch, StatLock and occlusive dressing as per protocol. Curos caps applied to port. Patient tolerated procedure well with minimal blood loss. Reason for access : Long Term Abx (X 2 more days)    Complications related to insertion : None (Note: Left arm done due to history of CVA w/ Right sided deficit.)     See nursing message on [place] for midline reminders. Midline is CT and MRI compatible. Inserted by : Guadalupe Beaulieu RN, BSN, [facility] Vascular Access/PICC  Nurse  Assisted by : Philip Mg RN [facility], PICC Nurse, Vascular Access Team       Catheter Length : 13 cm   External Length : 2 cm   TOTAL Length: 15cm  arm circumference : 29 cm  Catheter occupies 10  % of vein. Type of Midline: BARD  Ref #:   [id number]  Lot #: [id number]  Expiration Date: 08/31/2023  Informed primary nurse midline is ready for use and to hang new infusion tubing prior to use.       Guadalupe Beaulieu RN, BSN, [facility]   Vascular Acces Team

## 2022-12-28 NOTE — DISCHARGE INSTRUCTIONS
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 fib  History of chronic heart failure reduced ejection fraction  ESBL UTI POA- complete IV Meropenem till 2022 via Midline, then remove it  Hypothyroidism- Continue home Synthroid  Hypercapnic respiratory failure, resolved  Healthcare associated pneumonia, POA  DNR    MEDICATIONS:  As per medication reconciliation  list  It is important that you take the medication exactly as they are prescribed. Keep your medication in the bottles provided by the pharmacist and keep a list of the medication names, dosages, and times to be taken in your wallet. Do not take other medications without consulting your doctor. Pain Management: per above medications    What to do at Home    Recommended diet:  Cardiac Diet and Low fat, Low cholesterol    Recommended activity: Activity as tolerated    If you have questions regarding the hospital related prescriptions or hospital related issues please call at [phone]. If you experience any of the following symptoms then please call your primary care physician or return to the emergency room if you cannot get hold of your doctor:  Fever, chills, nausea, vomiting, diarrhea, change in mentation, falling, bleeding, shortness of breath,     Follow Up:  PCP  you are to call and set up an appointment to see them in 7-10 days. Information obtained by :  I understand that if any problems occur once I am at home I am to contact my physician. I understand and acknowledge receipt of the instructions indicated above. Physician's or R.N.'s Signature                                                                  Date/Time                                                                                                                                              Patient or Representative Signature                                                          Date/Time

## 2022-12-28 NOTE — HOSPICE
[employer] RN note:  Note consult for hospice cancelled as goals of care are more restorative ie continue current course of abx. Discussed pt with palliative NP Alex Marley. Thank you for the opportunity to care for this pt and family. Please contact hospice at [phone] with any questions or concerns. PAIN

## 2022-12-28 NOTE — PROGRESS NOTES
Palliative Medicine        Clarified with patient's sister, Esdras Elias, that she does not want hospice at this time. Esdras Elias said Jennifer Early was under hospice care in the past but no longer met criteria. Yesterday, I was not aware there was a discussion about hospice. Pablo and I spoke (and she confirmed with Elnor Quale) that they do want DNR for the patient but they do want repeat hospitalizations if necessary. They would want the patient placed on the ventilator if needed for respiratory issues. We reviewed this again this am and these are their wishes. I dc/d the hospice consult. Message to case management, Saroj Thibodeaux. Also spoke to [place] with hospice. LM via perfect serve for Dr. Evan Sanchez, the attending.       Please note:  patient to get coarse of antibiotics through 12/31

## 2022-12-28 NOTE — PROGRESS NOTES
Bedside shift change report given to Mariama Steele (oncoming nurse) by Rafi junior (offgoing nurse). Report included the following information SBAR, Kardex, Intake/Output, Recent Results, and Quality Measures.

## 2022-12-28 NOTE — PROGRESS NOTES
I have reviewed discharge instructions and called report with Timothy England of [facility]. Andrea Chen RN verbalized understanding.

## 2022-12-28 NOTE — PROGRESS NOTES
Problem: Non-Violent Restraints  Goal: Removal from restraints as soon as assessed to be safe  12/28/2022 1544 by Villa Suggs RN  Outcome: Resolved/Met  12/28/2022 1515 by Villa Suggs RN  Outcome: Progressing Towards Goal  Goal: No harm/injury to patient while restraints in use  12/28/2022 1544 by Villa Suggs RN  Outcome: Resolved/Met  12/28/2022 1515 by Villa Suggs RN  Outcome: Progressing Towards Goal  Goal: Patient's dignity will be maintained  12/28/2022 1544 by Villa Suggs RN  Outcome: Resolved/Met  12/28/2022 1515 by Villa Suggs RN  Outcome: Progressing Towards Goal  Goal: Patient Interventions  12/28/2022 1544 by Villa Suggs RN  Outcome: Resolved/Met  12/28/2022 1515 by Villa Suggs RN  Outcome: Progressing Towards Goal

## 2022-12-30 LAB
BACTERIA SPEC CULT: NORMAL
SERVICE CMNT-IMP: NORMAL

## 2023-01-03 NOTE — PROGRESS NOTES
Physician Progress Note      Beto Bacon  CSN #:                  [id number]  :                       1965  ADMIT DATE:       2022 1:27 PM  Cruz Robbins DATE:        2022 6:18 PM  RESPONDING  PROVIDER #:        Minesh Melendez MD          QUERY TEXT:    Pt admitted with Seizure. Pt noted to have prior CVA. If possible, please document in progress notes and discharge summary if you are evaluating and/or treating any of the following: The medical record reflects the following:  Risk Factors: male from [facility] intubated for hypercapnic respiratory failure secondary to status epilepticus. He has a known history of seizure disorder    Clinical Indicators: Patient was admitted with seizure and has a history of stroke in?right frontal infarct. ? Patient was treated with Keppra. Treatment: [facility] ICU, Neurology consult, EEG, Keppra, Depakote, Mechanical intubation, seizure precautions, vitals per unit routine. Options provided:  -- Seizure as a sequela of prior CVA  -- Seizure is not a sequela of prior CVA  -- Other - I will add my own diagnosis  -- Disagree - Not applicable / Not valid  -- Disagree - Clinically unable to determine / Unknown  -- Refer to Clinical Documentation Reviewer    PROVIDER RESPONSE TEXT:    Provider disagreed with this query.     Query created by: Garrett Butt on 1/3/2023 9:33 AM      Electronically signed by:  Minesh Melendez MD 1/3/2023 12:33 PM

## 2023-01-04 NOTE — ED NOTES
[employer] report handed to Provider at this time. "I'm having a neuroma removed from my foot."

## 2025-02-18 ENCOUNTER — APPOINTMENT (OUTPATIENT)
Facility: HOSPITAL | Age: 60
End: 2025-02-18
Payer: COMMERCIAL

## 2025-02-18 ENCOUNTER — HOSPITAL ENCOUNTER (EMERGENCY)
Facility: HOSPITAL | Age: 60
Discharge: NURSING, REHAB OR LONG-TERM CARE | End: 2025-02-18
Attending: EMERGENCY MEDICINE
Payer: COMMERCIAL

## 2025-02-18 VITALS
WEIGHT: 189.6 LBS | SYSTOLIC BLOOD PRESSURE: 104 MMHG | HEART RATE: 70 BPM | BODY MASS INDEX: 28.08 KG/M2 | DIASTOLIC BLOOD PRESSURE: 65 MMHG | RESPIRATION RATE: 18 BRPM | OXYGEN SATURATION: 98 % | HEIGHT: 69 IN | TEMPERATURE: 98 F

## 2025-02-18 DIAGNOSIS — I51.3 LEFT ATRIAL THROMBUS: ICD-10-CM

## 2025-02-18 DIAGNOSIS — E87.5 HYPERKALEMIA: ICD-10-CM

## 2025-02-18 DIAGNOSIS — R11.2 NAUSEA AND VOMITING, UNSPECIFIED VOMITING TYPE: Primary | ICD-10-CM

## 2025-02-18 DIAGNOSIS — E86.0 DEHYDRATION: ICD-10-CM

## 2025-02-18 LAB
ALBUMIN SERPL-MCNC: 3.7 G/DL (ref 3.5–5)
ALBUMIN/GLOB SERPL: 0.7 (ref 1.1–2.2)
ALP SERPL-CCNC: 179 U/L (ref 45–117)
ALT SERPL-CCNC: 28 U/L (ref 12–78)
ANION GAP SERPL CALC-SCNC: 2 MMOL/L (ref 2–12)
ANION GAP SERPL CALC-SCNC: 6 MMOL/L (ref 2–12)
AST SERPL-CCNC: 52 U/L (ref 15–37)
BASOPHILS # BLD: 0.03 K/UL (ref 0–0.1)
BASOPHILS NFR BLD: 0.4 % (ref 0–1)
BILIRUB SERPL-MCNC: 3.4 MG/DL (ref 0.2–1)
BUN SERPL-MCNC: 23 MG/DL (ref 6–20)
BUN SERPL-MCNC: 23 MG/DL (ref 6–20)
BUN/CREAT SERPL: 15 (ref 12–20)
BUN/CREAT SERPL: 17 (ref 12–20)
CA-I BLD-SCNC: 1.34 MMOL/L (ref 1.12–1.32)
CALCIUM SERPL-MCNC: 10.1 MG/DL (ref 8.5–10.1)
CALCIUM SERPL-MCNC: 9.8 MG/DL (ref 8.5–10.1)
CHLORIDE SERPL-SCNC: 106 MMOL/L (ref 97–108)
CHLORIDE SERPL-SCNC: 106 MMOL/L (ref 97–108)
CO2 SERPL-SCNC: 27 MMOL/L (ref 21–32)
CO2 SERPL-SCNC: 33 MMOL/L (ref 21–32)
COMMENT:: NORMAL
CREAT SERPL-MCNC: 1.32 MG/DL (ref 0.7–1.3)
CREAT SERPL-MCNC: 1.52 MG/DL (ref 0.7–1.3)
DIFFERENTIAL METHOD BLD: ABNORMAL
EOSINOPHIL # BLD: 0.13 K/UL (ref 0–0.4)
EOSINOPHIL NFR BLD: 1.8 % (ref 0–7)
ERYTHROCYTE [DISTWIDTH] IN BLOOD BY AUTOMATED COUNT: 18.7 % (ref 11.5–14.5)
FLUAV RNA SPEC QL NAA+PROBE: NOT DETECTED
FLUBV RNA SPEC QL NAA+PROBE: NOT DETECTED
GLOBULIN SER CALC-MCNC: 5.6 G/DL (ref 2–4)
GLUCOSE SERPL-MCNC: 88 MG/DL (ref 65–100)
GLUCOSE SERPL-MCNC: 94 MG/DL (ref 65–100)
HCT VFR BLD AUTO: 49 % (ref 36.6–50.3)
HGB BLD-MCNC: 15.8 G/DL (ref 12.1–17)
IMM GRANULOCYTES # BLD AUTO: 0.05 K/UL (ref 0–0.04)
IMM GRANULOCYTES NFR BLD AUTO: 0.7 % (ref 0–0.5)
LIPASE SERPL-CCNC: 41 U/L (ref 13–75)
LYMPHOCYTES # BLD: 0.64 K/UL (ref 0.8–3.5)
LYMPHOCYTES NFR BLD: 8.6 % (ref 12–49)
MAGNESIUM SERPL-MCNC: 1.7 MG/DL (ref 1.6–2.4)
MCH RBC QN AUTO: 23.6 PG (ref 26–34)
MCHC RBC AUTO-ENTMCNC: 32.2 G/DL (ref 30–36.5)
MCV RBC AUTO: 73.1 FL (ref 80–99)
MONOCYTES # BLD: 0.66 K/UL (ref 0–1)
MONOCYTES NFR BLD: 8.9 % (ref 5–13)
NEUTS SEG # BLD: 5.89 K/UL (ref 1.8–8)
NEUTS SEG NFR BLD: 79.6 % (ref 32–75)
NRBC # BLD: 0 K/UL (ref 0–0.01)
NRBC BLD-RTO: 0 PER 100 WBC
PLATELET # BLD AUTO: 102 K/UL (ref 150–400)
POTASSIUM SERPL-SCNC: 4.3 MMOL/L (ref 3.5–5.1)
POTASSIUM SERPL-SCNC: 5.6 MMOL/L (ref 3.5–5.1)
PROCALCITONIN SERPL-MCNC: 0.07 NG/ML
PROT SERPL-MCNC: 9.3 G/DL (ref 6.4–8.2)
RBC # BLD AUTO: 6.7 M/UL (ref 4.1–5.7)
RBC MORPH BLD: ABNORMAL
SARS-COV-2 RNA RESP QL NAA+PROBE: NOT DETECTED
SODIUM SERPL-SCNC: 139 MMOL/L (ref 136–145)
SODIUM SERPL-SCNC: 141 MMOL/L (ref 136–145)
SOURCE: NORMAL
SPECIMEN HOLD: NORMAL
TROPONIN I SERPL HS-MCNC: 167 NG/L (ref 0–76)
TROPONIN I SERPL HS-MCNC: 172 NG/L (ref 0–76)
WBC # BLD AUTO: 7.4 K/UL (ref 4.1–11.1)

## 2025-02-18 PROCEDURE — 82330 ASSAY OF CALCIUM: CPT

## 2025-02-18 PROCEDURE — 87636 SARSCOV2 & INF A&B AMP PRB: CPT

## 2025-02-18 PROCEDURE — 80053 COMPREHEN METABOLIC PANEL: CPT

## 2025-02-18 PROCEDURE — 96361 HYDRATE IV INFUSION ADD-ON: CPT

## 2025-02-18 PROCEDURE — 6360000004 HC RX CONTRAST MEDICATION: Performed by: EMERGENCY MEDICINE

## 2025-02-18 PROCEDURE — 70450 CT HEAD/BRAIN W/O DYE: CPT

## 2025-02-18 PROCEDURE — 84145 PROCALCITONIN (PCT): CPT

## 2025-02-18 PROCEDURE — 83735 ASSAY OF MAGNESIUM: CPT

## 2025-02-18 PROCEDURE — 83690 ASSAY OF LIPASE: CPT

## 2025-02-18 PROCEDURE — 2500000003 HC RX 250 WO HCPCS: Performed by: EMERGENCY MEDICINE

## 2025-02-18 PROCEDURE — 96375 TX/PRO/DX INJ NEW DRUG ADDON: CPT

## 2025-02-18 PROCEDURE — 6370000000 HC RX 637 (ALT 250 FOR IP): Performed by: EMERGENCY MEDICINE

## 2025-02-18 PROCEDURE — 99285 EMERGENCY DEPT VISIT HI MDM: CPT

## 2025-02-18 PROCEDURE — 6360000002 HC RX W HCPCS: Performed by: EMERGENCY MEDICINE

## 2025-02-18 PROCEDURE — 96374 THER/PROPH/DIAG INJ IV PUSH: CPT

## 2025-02-18 PROCEDURE — 84484 ASSAY OF TROPONIN QUANT: CPT

## 2025-02-18 PROCEDURE — 85025 COMPLETE CBC W/AUTO DIFF WBC: CPT

## 2025-02-18 PROCEDURE — 71045 X-RAY EXAM CHEST 1 VIEW: CPT

## 2025-02-18 PROCEDURE — 36415 COLL VENOUS BLD VENIPUNCTURE: CPT

## 2025-02-18 PROCEDURE — 2580000003 HC RX 258: Performed by: EMERGENCY MEDICINE

## 2025-02-18 PROCEDURE — 74177 CT ABD & PELVIS W/CONTRAST: CPT

## 2025-02-18 RX ORDER — DICYCLOMINE HCL 20 MG
20 TABLET ORAL ONCE
Status: COMPLETED | OUTPATIENT
Start: 2025-02-18 | End: 2025-02-18

## 2025-02-18 RX ORDER — IOPAMIDOL 755 MG/ML
100 INJECTION, SOLUTION INTRAVASCULAR
Status: COMPLETED | OUTPATIENT
Start: 2025-02-18 | End: 2025-02-18

## 2025-02-18 RX ORDER — 0.9 % SODIUM CHLORIDE 0.9 %
500 INTRAVENOUS SOLUTION INTRAVENOUS ONCE
Status: COMPLETED | OUTPATIENT
Start: 2025-02-18 | End: 2025-02-18

## 2025-02-18 RX ORDER — ONDANSETRON 4 MG/1
4 TABLET, FILM COATED ORAL EVERY 8 HOURS PRN
Qty: 30 TABLET | Refills: 0 | Status: SHIPPED | OUTPATIENT
Start: 2025-02-18

## 2025-02-18 RX ORDER — ONDANSETRON 2 MG/ML
4 INJECTION INTRAMUSCULAR; INTRAVENOUS ONCE
Status: COMPLETED | OUTPATIENT
Start: 2025-02-18 | End: 2025-02-18

## 2025-02-18 RX ORDER — DICYCLOMINE HYDROCHLORIDE 10 MG/1
10 CAPSULE ORAL
Qty: 30 CAPSULE | Refills: 0 | Status: SHIPPED | OUTPATIENT
Start: 2025-02-18

## 2025-02-18 RX ORDER — LOPERAMIDE HYDROCHLORIDE 2 MG/1
2 CAPSULE ORAL 4 TIMES DAILY PRN
Qty: 20 CAPSULE | Refills: 0 | Status: SHIPPED | OUTPATIENT
Start: 2025-02-18 | End: 2025-02-28

## 2025-02-18 RX ADMIN — DICYCLOMINE HYDROCHLORIDE 20 MG: 20 TABLET ORAL at 10:20

## 2025-02-18 RX ADMIN — ONDANSETRON 4 MG: 2 INJECTION, SOLUTION INTRAMUSCULAR; INTRAVENOUS at 10:24

## 2025-02-18 RX ADMIN — IOPAMIDOL 100 ML: 755 INJECTION, SOLUTION INTRAVENOUS at 11:48

## 2025-02-18 RX ADMIN — FAMOTIDINE 20 MG: 10 INJECTION, SOLUTION INTRAVENOUS at 10:21

## 2025-02-18 RX ADMIN — SODIUM CHLORIDE 500 ML: 9 INJECTION, SOLUTION INTRAVENOUS at 10:19

## 2025-02-18 ASSESSMENT — PAIN SCALES - GENERAL
PAINLEVEL_OUTOF10: 0

## 2025-02-18 ASSESSMENT — LIFESTYLE VARIABLES
HOW MANY STANDARD DRINKS CONTAINING ALCOHOL DO YOU HAVE ON A TYPICAL DAY: PATIENT DOES NOT DRINK
HOW OFTEN DO YOU HAVE A DRINK CONTAINING ALCOHOL: NEVER

## 2025-02-18 NOTE — DISCHARGE INSTRUCTIONS
hydronephrosis STOMACH: Unremarkable. SMALL BOWEL: No dilatation or wall thickening. COLON: No dilatation or wall thickening. APPENDIX: Status post appendectomy PERITONEUM: Trace ascites RETROPERITONEUM: Aortic atherosclerosis without aneurysm REPRODUCTIVE ORGANS: Prostate is enlarged URINARY BLADDER: No mass or calculus. BONES: No destructive bone lesion. ABDOMINAL WALL: No mass or hernia. ADDITIONAL COMMENTS: N/A     1. No acute abdominal or pelvic pathology. 2. Trace abdominal and pelvic ascites. 3. Multifocal bilateral renal cortical scarring. No acute genitourinary abnormality. Electronically signed by Vish Long MD    CT Head W/O Contrast    Result Date: 2/18/2025  EXAM: CT HEAD WO CONTRAST INDICATION: vomiting, AMS COMPARISON: 12/21/2022. CONTRAST: None. TECHNIQUE: Unenhanced CT of the head was performed using 5 mm images. Brain and bone windows were generated. Coronal and sagittal reformats. CT dose reduction was achieved through use of a standardized protocol tailored for this examination and automatic exposure control for dose modulation.  FINDINGS: Generalized prominence of cerebral sulci and ventricles is mildly increased for age but stable.. Periventricular white matter low-density is mild and diffuse but also stable. Extensive encephalomalacia throughout the left middle cerebral artery territory in the left cerebral hemisphere is stable with volume loss. Focal low density in the right frontal deep white matter is stable, as is a small low-density in the left cerebellar hemisphere.. There is no new intracranial hemorrhage, extra-axial collection, or mass effect. The basilar cisterns are open. No CT evidence of acute infarct. The bone windows demonstrate no abnormalities. The visualized portions of the paranasal sinuses and mastoid air cells are clear.     1. No acute intracranial abnormality is identified. 2. Extensive stable old ischemic injury with mild microvascular ischemic change. Electronically

## 2025-02-18 NOTE — ED NOTES
Pt had BM. Pt cleaned and brief/gown/linens changed. Pt consented to external catheter. External catheter placed on pt. Pt tolerating well.

## 2025-02-18 NOTE — ED NOTES
Pt Rx obtained from [facility] pharmacy and will be sent back to [facility] with pt.

## 2025-02-18 NOTE — ED NOTES
Pt sister, Jd, called. Pt provided consent to speak with Jd; status update provided. Call transferred to pt room per family request.

## 2025-02-18 NOTE — ED PROVIDER NOTES
[facility] EMERGENCY DEPARTMENT  EMERGENCY DEPARTMENT ENCOUNTER       Pt Name: Stephen Baker  MRN: [id number]  Birthdate 1965  Date of evaluation: 2/18/2025  Provider: Nir Batista MD   PCP: Major Arias MD  Note Started: 11:17 AM EST 2/18/25     CHIEF COMPLAINT       Chief Complaint   Patient presents with    Vomiting     Pt BIBEMS from [facility] d/t vomiting x this am. Pt has hx CVA w/ residual aphasia - unable to verbally communicate but indicating responses to yes or no questions. Per staff, pt is at his baseline except for vomiting. Pt denies any abdominal pain, diarrhea or other sx att.         HISTORY OF PRESENT ILLNESS: 1 or more elements      History From: Patient  HPI Limitations: None     Stephen Baker is a 59 y.o. male who presents with history of stroke and aphasia, history of hypertension, depression, medical problems as stated below, presenting from [facility] rehab with complaints of nausea vomiting this morning.  He is unable to verbally communicate but does answer questions with yes or no responses.  He denies any chest pain, headache or abdominal pain.  There is intermittent reports of diarrhea but he is currently declining this.  He reports only feeling nauseated and vomiting.     Nursing Notes were all reviewed and agreed with or any disagreements were addressed in the HPI.     REVIEW OF SYSTEMS      Review of Systems     Positives and Pertinent negatives as per HPI.    PAST HISTORY     Past Medical History:  Past Medical History:   Diagnosis Date    Acid reflux     Alcohol abuse     Arthropathy, unspecified, site unspecified     Atrial fibrillation (HCC)     Calcaneus fracture     Condyloma     Depression     Essential hypertension     H/O mitral valve repair 2006    Along with MAZE procedure    High cholesterol     History of angina     Hypertension     IHSS (idiopathic hypertrophic subaortic stenosis) (HCC)     Ill-defined condition     pacemaker

## 2025-02-22 LAB
EKG ATRIAL RATE: 70 BPM
EKG DIAGNOSIS: NORMAL
EKG Q-T INTERVAL: 574 MS
EKG QRS DURATION: 182 MS
EKG QTC CALCULATION (BAZETT): 619 MS
EKG R AXIS: -74 DEGREES
EKG T AXIS: 125 DEGREES
EKG VENTRICULAR RATE: 70 BPM

## 2025-05-30 ENCOUNTER — HOSPITAL ENCOUNTER (EMERGENCY)
Facility: HOSPITAL | Age: 60
Discharge: ANOTHER HOSPITAL | End: 2025-05-31
Attending: STUDENT IN AN ORGANIZED HEALTH CARE EDUCATION/TRAINING PROGRAM
Payer: COMMERCIAL

## 2025-05-30 ENCOUNTER — APPOINTMENT (OUTPATIENT)
Facility: HOSPITAL | Age: 60
End: 2025-05-30
Payer: COMMERCIAL

## 2025-05-30 DIAGNOSIS — H05.20 PROPTOSIS: ICD-10-CM

## 2025-05-30 DIAGNOSIS — S02.32XB OPEN FRACTURE OF LEFT ORBITAL FLOOR, INITIAL ENCOUNTER (HCC): Primary | ICD-10-CM

## 2025-05-30 DIAGNOSIS — S09.93XA BLUNT TRAUMA OF FACE, INITIAL ENCOUNTER: ICD-10-CM

## 2025-05-30 LAB
ALBUMIN SERPL-MCNC: 4 G/DL (ref 3.5–5)
ALBUMIN/GLOB SERPL: 0.7 (ref 1.1–2.2)
ALP SERPL-CCNC: 230 U/L (ref 45–117)
ALT SERPL-CCNC: 35 U/L (ref 12–78)
ANION GAP SERPL CALC-SCNC: 7 MMOL/L (ref 2–12)
APTT PPP: 32.5 SEC (ref 22.1–31)
AST SERPL-CCNC: 36 U/L (ref 15–37)
BASOPHILS # BLD: 0.04 K/UL (ref 0–0.1)
BASOPHILS NFR BLD: 1 % (ref 0–1)
BILIRUB SERPL-MCNC: 4.4 MG/DL (ref 0.2–1)
BUN SERPL-MCNC: 18 MG/DL (ref 6–20)
BUN/CREAT SERPL: 11 (ref 12–20)
CALCIUM SERPL-MCNC: 10.1 MG/DL (ref 8.5–10.1)
CHLORIDE SERPL-SCNC: 106 MMOL/L (ref 97–108)
CO2 SERPL-SCNC: 26 MMOL/L (ref 21–32)
CREAT SERPL-MCNC: 1.62 MG/DL (ref 0.7–1.3)
DIFFERENTIAL METHOD BLD: ABNORMAL
EOSINOPHIL # BLD: 0.07 K/UL (ref 0–0.4)
EOSINOPHIL NFR BLD: 1.7 % (ref 0–7)
ERYTHROCYTE [DISTWIDTH] IN BLOOD BY AUTOMATED COUNT: 19.7 % (ref 11.5–14.5)
GLOBULIN SER CALC-MCNC: 5.5 G/DL (ref 2–4)
GLUCOSE SERPL-MCNC: 105 MG/DL (ref 65–100)
HCT VFR BLD AUTO: 49.1 % (ref 36.6–50.3)
HGB BLD-MCNC: 15.4 G/DL (ref 12.1–17)
IMM GRANULOCYTES # BLD AUTO: 0.03 K/UL (ref 0–0.04)
IMM GRANULOCYTES NFR BLD AUTO: 0.7 % (ref 0–0.5)
INR PPP: 1.7 (ref 0.9–1.1)
LYMPHOCYTES # BLD: 0.89 K/UL (ref 0.8–3.5)
LYMPHOCYTES NFR BLD: 21.2 % (ref 12–49)
MCH RBC QN AUTO: 23.9 PG (ref 26–34)
MCHC RBC AUTO-ENTMCNC: 31.4 G/DL (ref 30–36.5)
MCV RBC AUTO: 76.2 FL (ref 80–99)
MONOCYTES # BLD: 0.54 K/UL (ref 0–1)
MONOCYTES NFR BLD: 12.9 % (ref 5–13)
NEUTS SEG # BLD: 2.62 K/UL (ref 1.8–8)
NEUTS SEG NFR BLD: 62.5 % (ref 32–75)
NRBC # BLD: 0 K/UL (ref 0–0.01)
NRBC BLD-RTO: 0 PER 100 WBC
PLATELET # BLD AUTO: 103 K/UL (ref 150–400)
POTASSIUM SERPL-SCNC: 4 MMOL/L (ref 3.5–5.1)
PROT SERPL-MCNC: 9.5 G/DL (ref 6.4–8.2)
PROTHROMBIN TIME: 17.2 SEC (ref 9.2–11.2)
RBC # BLD AUTO: 6.44 M/UL (ref 4.1–5.7)
SODIUM SERPL-SCNC: 139 MMOL/L (ref 136–145)
THERAPEUTIC RANGE: ABNORMAL SECS (ref 58–77)
WBC # BLD AUTO: 4.2 K/UL (ref 4.1–11.1)

## 2025-05-30 PROCEDURE — 86850 RBC ANTIBODY SCREEN: CPT

## 2025-05-30 PROCEDURE — 36415 COLL VENOUS BLD VENIPUNCTURE: CPT

## 2025-05-30 PROCEDURE — 99285 EMERGENCY DEPT VISIT HI MDM: CPT

## 2025-05-30 PROCEDURE — 90471 IMMUNIZATION ADMIN: CPT

## 2025-05-30 PROCEDURE — 6370000000 HC RX 637 (ALT 250 FOR IP)

## 2025-05-30 PROCEDURE — 85610 PROTHROMBIN TIME: CPT

## 2025-05-30 PROCEDURE — 2500000003 HC RX 250 WO HCPCS

## 2025-05-30 PROCEDURE — 80053 COMPREHEN METABOLIC PANEL: CPT

## 2025-05-30 PROCEDURE — C9046 COCAINE HCL NASAL SOLUTION: HCPCS

## 2025-05-30 PROCEDURE — 6360000002 HC RX W HCPCS

## 2025-05-30 PROCEDURE — 96375 TX/PRO/DX INJ NEW DRUG ADDON: CPT

## 2025-05-30 PROCEDURE — 96374 THER/PROPH/DIAG INJ IV PUSH: CPT

## 2025-05-30 PROCEDURE — 86901 BLOOD TYPING SEROLOGIC RH(D): CPT

## 2025-05-30 PROCEDURE — 86900 BLOOD TYPING SEROLOGIC ABO: CPT

## 2025-05-30 PROCEDURE — 70486 CT MAXILLOFACIAL W/O DYE: CPT

## 2025-05-30 PROCEDURE — 85730 THROMBOPLASTIN TIME PARTIAL: CPT

## 2025-05-30 PROCEDURE — 72125 CT NECK SPINE W/O DYE: CPT

## 2025-05-30 PROCEDURE — 70450 CT HEAD/BRAIN W/O DYE: CPT

## 2025-05-30 PROCEDURE — 90714 TD VACC NO PRESV 7 YRS+ IM: CPT

## 2025-05-30 PROCEDURE — 85025 COMPLETE CBC W/AUTO DIFF WBC: CPT

## 2025-05-30 PROCEDURE — 2580000003 HC RX 258

## 2025-05-30 RX ORDER — ONDANSETRON 2 MG/ML
4 INJECTION INTRAMUSCULAR; INTRAVENOUS ONCE
Status: COMPLETED | OUTPATIENT
Start: 2025-05-30 | End: 2025-05-30

## 2025-05-30 RX ORDER — TRANEXAMIC ACID 100 MG/ML
1000 INJECTION, SOLUTION INTRAVENOUS ONCE
Status: COMPLETED | OUTPATIENT
Start: 2025-05-30 | End: 2025-05-30

## 2025-05-30 RX ORDER — DIAZEPAM 10 MG/2ML
2.5 INJECTION, SOLUTION INTRAMUSCULAR; INTRAVENOUS ONCE
Status: COMPLETED | OUTPATIENT
Start: 2025-05-30 | End: 2025-05-30

## 2025-05-30 RX ORDER — TETRACAINE HYDROCHLORIDE 5 MG/ML
1 SOLUTION OPHTHALMIC
Status: COMPLETED | OUTPATIENT
Start: 2025-05-30 | End: 2025-05-30

## 2025-05-30 RX ORDER — ONDANSETRON 2 MG/ML
INJECTION INTRAMUSCULAR; INTRAVENOUS
Status: COMPLETED
Start: 2025-05-30 | End: 2025-05-30

## 2025-05-30 RX ORDER — COCAINE HYDROCHLORIDE 40 MG/ML
40 SOLUTION NASAL ONCE
Refills: 0 | Status: COMPLETED | OUTPATIENT
Start: 2025-05-30 | End: 2025-05-30

## 2025-05-30 RX ORDER — FENTANYL CITRATE 50 UG/ML
25 INJECTION, SOLUTION INTRAMUSCULAR; INTRAVENOUS
Refills: 0 | Status: COMPLETED | OUTPATIENT
Start: 2025-05-30 | End: 2025-05-30

## 2025-05-30 RX ORDER — OXYMETAZOLINE HYDROCHLORIDE 0.05 G/100ML
2 SPRAY NASAL
Status: COMPLETED | OUTPATIENT
Start: 2025-05-30 | End: 2025-05-30

## 2025-05-30 RX ADMIN — ONDANSETRON 4 MG: 2 INJECTION INTRAMUSCULAR; INTRAVENOUS at 21:09

## 2025-05-30 RX ADMIN — CLOSTRIDIUM TETANI TOXOID ANTIGEN (FORMALDEHYDE INACTIVATED) AND CORYNEBACTERIUM DIPHTHERIAE TOXOID ANTIGEN (FORMALDEHYDE INACTIVATED) 0.5 ML: 5; 2 INJECTION, SUSPENSION INTRAMUSCULAR at 23:01

## 2025-05-30 RX ADMIN — ONDANSETRON 4 MG: 2 INJECTION, SOLUTION INTRAMUSCULAR; INTRAVENOUS at 21:09

## 2025-05-30 RX ADMIN — DIAZEPAM 2.5 MG: 5 INJECTION, SOLUTION INTRAMUSCULAR; INTRAVENOUS at 21:09

## 2025-05-30 RX ADMIN — TETRACAINE HYDROCHLORIDE 1 DROP: 5 SOLUTION OPHTHALMIC at 21:01

## 2025-05-30 RX ADMIN — COCAINE HYDROCHLORIDE 40 MG: 40 SOLUTION NASAL at 22:10

## 2025-05-30 RX ADMIN — FLUORESCEIN SODIUM 1 MG: 1 STRIP OPHTHALMIC at 21:00

## 2025-05-30 RX ADMIN — FENTANYL CITRATE 25 MCG: 50 INJECTION INTRAMUSCULAR; INTRAVENOUS at 22:14

## 2025-05-30 RX ADMIN — TRANEXAMIC ACID 1000 MG: 1 INJECTION, SOLUTION INTRAVENOUS at 21:34

## 2025-05-30 RX ADMIN — SODIUM CHLORIDE 3000 MG: 9 INJECTION, SOLUTION INTRAVENOUS at 23:00

## 2025-05-30 RX ADMIN — OXYMETAZOLINE HYDROCHLORIDE 2 SPRAY: 0.5 SPRAY NASAL at 20:54

## 2025-05-30 ASSESSMENT — TONOMETRY
IOP_AUTOMATED: 1
OD_IOP_MMHG: 19
OS_IOP_MMHG: 25

## 2025-05-30 ASSESSMENT — LIFESTYLE VARIABLES
HOW OFTEN DO YOU HAVE A DRINK CONTAINING ALCOHOL: NEVER
HOW MANY STANDARD DRINKS CONTAINING ALCOHOL DO YOU HAVE ON A TYPICAL DAY: PATIENT DOES NOT DRINK

## 2025-05-30 ASSESSMENT — PAIN - FUNCTIONAL ASSESSMENT: PAIN_FUNCTIONAL_ASSESSMENT: NONE - DENIES PAIN

## 2025-05-31 VITALS
RESPIRATION RATE: 19 BRPM | HEART RATE: 68 BPM | DIASTOLIC BLOOD PRESSURE: 74 MMHG | OXYGEN SATURATION: 95 % | BODY MASS INDEX: 27.9 KG/M2 | SYSTOLIC BLOOD PRESSURE: 137 MMHG | TEMPERATURE: 98.1 F | HEIGHT: 69 IN | WEIGHT: 188.38 LBS

## 2025-05-31 LAB
ABO + RH BLD: NORMAL
BLOOD GROUP ANTIBODIES SERPL: NORMAL
SPECIMEN EXP DATE BLD: NORMAL

## 2025-05-31 NOTE — ED NOTES
This RN called access center regarding transport ETABebe told this RN that the [facility] transport was cancelled. We were not notified. Phone passed to charge RN
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
## 2025-05-31 NOTE — ED NOTES
CT called d/t patient appearing to vomit blood. This RN went to CT to assess patient, as well as ED providers. This RN arrived to CT to see patient sitting up on CT table holding an emesis bag, bright red blood noted, roughly 100-200 mL. This RN asked the patient if he is on blood thinning medications to which he states no. Per CT techs, after laying flat to attempt to get scans they state to this RN that the patient began to vomit blood. Per Jeff NP, this RN to bring the patient back to the room. Afrin given by Jeff NP, nasal clamp applied to nose. Bleeding controlled att. CT's not obtained att.
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
## 2025-05-31 NOTE — ED NOTES
Patient is being transferred from [facility] ED to [facility] ED.    Transfer initiated by LUIZA Aguilar NP for the following unavailable service(s):    Report called to SAILAJA Lyons by SAILAJA Rascon. Verbal report by telephone included ED Summary, recent results, treatments and interventions, medications, current condition, and IV access. All questions, concerns, and/or requests answered and addressed prior to completion of report.    Transport with [facility] cancelled. Transport with [facility] arranged with an ETA of 0015. Patient belongings secured and in custody of transport team.     EMTALA completed and reviewed by Nini Caro RN. Original copy of EMTALA provided to transport team alongside Patient Demographics, ED Encounter Summary, and any additional physical documents included in patient's chart.

## 2025-05-31 NOTE — ED NOTES
Assumed care of patient at this time; report received from SAILAJA Rascon. Patient arrives to ED Room 14 with nose clamp in place. TXA nebulizer prepared and administered through mask by Ariana MENARD. Patient is responsive to verbal stimuli with delayed responses; notified that patient is aphasic at baseline due to previous CVA. Patient's hands are cool to touch, adhesive pulse ox applied to improve accuracy of oxygenation measurement. Patient passed swallow screen with Betty MENARD; patient NPO at time of transfer of care.

## 2025-05-31 NOTE — ED NOTES
RN at bedside assisting patient with emesis bag and providing support with suction when requested. Emesis to be measured when symptoms improve.

## 2025-08-02 ENCOUNTER — APPOINTMENT (OUTPATIENT)
Facility: HOSPITAL | Age: 60
End: 2025-08-02
Payer: COMMERCIAL

## 2025-08-02 ENCOUNTER — HOSPITAL ENCOUNTER (EMERGENCY)
Facility: HOSPITAL | Age: 60
Discharge: HOME | End: 2025-08-02
Attending: EMERGENCY MEDICINE
Payer: COMMERCIAL

## 2025-08-02 VITALS
RESPIRATION RATE: 20 BRPM | SYSTOLIC BLOOD PRESSURE: 147 MMHG | OXYGEN SATURATION: 95 % | DIASTOLIC BLOOD PRESSURE: 86 MMHG | TEMPERATURE: 97.8 F | HEART RATE: 73 BPM

## 2025-08-02 DIAGNOSIS — M79.89 LEG SWELLING: Primary | ICD-10-CM

## 2025-08-02 DIAGNOSIS — N39.0 ACUTE UTI: ICD-10-CM

## 2025-08-02 DIAGNOSIS — I50.9 ACUTE ON CHRONIC CONGESTIVE HEART FAILURE, UNSPECIFIED HEART FAILURE TYPE (HCC): ICD-10-CM

## 2025-08-02 LAB
ALBUMIN SERPL-MCNC: 3.3 G/DL (ref 3.5–5.2)
ALBUMIN/GLOB SERPL: 0.7 (ref 1.1–2.2)
ALP SERPL-CCNC: 205 U/L (ref 40–129)
ALT SERPL-CCNC: 16 U/L (ref 10–50)
ANION GAP SERPL CALC-SCNC: 12 MMOL/L (ref 2–14)
APPEARANCE UR: CLEAR
AST SERPL-CCNC: 30 U/L (ref 10–50)
BACTERIA URNS QL MICRO: ABNORMAL /HPF
BASOPHILS # BLD: 0.03 K/UL (ref 0–0.1)
BASOPHILS NFR BLD: 0.7 % (ref 0–1)
BILIRUB SERPL-MCNC: 3.8 MG/DL (ref 0–1.2)
BILIRUB UR QL: NEGATIVE
BUN SERPL-MCNC: 16 MG/DL (ref 8–23)
BUN/CREAT SERPL: 12 (ref 12–20)
CALCIUM SERPL-MCNC: 10.2 MG/DL (ref 8.8–10.2)
CHLORIDE SERPL-SCNC: 106 MMOL/L (ref 98–107)
CO2 SERPL-SCNC: 27 MMOL/L (ref 20–29)
COLOR UR: ABNORMAL
COMMENT:: NORMAL
CREAT SERPL-MCNC: 1.37 MG/DL (ref 0.7–1.2)
DIFFERENTIAL METHOD BLD: ABNORMAL
EOSINOPHIL # BLD: 0.19 K/UL (ref 0–0.4)
EOSINOPHIL NFR BLD: 4.3 % (ref 0–7)
EPITH CASTS URNS QL MICRO: ABNORMAL /LPF
ERYTHROCYTE [DISTWIDTH] IN BLOOD BY AUTOMATED COUNT: 18.7 % (ref 11.5–14.5)
GLOBULIN SER CALC-MCNC: 4.8 G/DL (ref 2–4)
GLUCOSE SERPL-MCNC: 85 MG/DL (ref 65–100)
GLUCOSE UR STRIP.AUTO-MCNC: NEGATIVE MG/DL
HCT VFR BLD AUTO: 45.2 % (ref 36.6–50.3)
HGB BLD-MCNC: 14.3 G/DL (ref 12.1–17)
HGB UR QL STRIP: ABNORMAL
HYALINE CASTS URNS QL MICRO: ABNORMAL /LPF (ref 0–2)
IMM GRANULOCYTES # BLD AUTO: 0.01 K/UL (ref 0–0.04)
IMM GRANULOCYTES NFR BLD AUTO: 0.2 % (ref 0–0.5)
KETONES UR QL STRIP.AUTO: NEGATIVE MG/DL
LEUKOCYTE ESTERASE UR QL STRIP.AUTO: ABNORMAL
LYMPHOCYTES # BLD: 0.78 K/UL (ref 0.8–3.5)
LYMPHOCYTES NFR BLD: 17.8 % (ref 12–49)
MCH RBC QN AUTO: 23.5 PG (ref 26–34)
MCHC RBC AUTO-ENTMCNC: 31.6 G/DL (ref 30–36.5)
MCV RBC AUTO: 74.3 FL (ref 80–99)
MONOCYTES # BLD: 0.7 K/UL (ref 0–1)
MONOCYTES NFR BLD: 16 % (ref 5–13)
NEUTS SEG # BLD: 2.69 K/UL (ref 1.8–8)
NEUTS SEG NFR BLD: 61 % (ref 32–75)
NITRITE UR QL STRIP.AUTO: NEGATIVE
NRBC # BLD: 0 K/UL (ref 0–0.01)
NRBC BLD-RTO: 0 PER 100 WBC
NT PRO BNP: 2046 PG/ML (ref 0–125)
PH UR STRIP: 6.5 (ref 5–8)
PLATELET # BLD AUTO: 110 K/UL (ref 150–400)
POTASSIUM SERPL-SCNC: 3.7 MMOL/L (ref 3.5–5.1)
PROT SERPL-MCNC: 8.1 G/DL (ref 6.4–8.3)
PROT UR STRIP-MCNC: ABNORMAL MG/DL
RBC # BLD AUTO: 6.08 M/UL (ref 4.1–5.7)
RBC #/AREA URNS HPF: ABNORMAL /HPF (ref 0–5)
RBC MORPH BLD: ABNORMAL
SODIUM SERPL-SCNC: 145 MMOL/L (ref 136–145)
SP GR UR REFRACTOMETRY: 1.01
SPECIMEN HOLD: NORMAL
URINE CULTURE IF INDICATED: ABNORMAL
UROBILINOGEN UR QL STRIP.AUTO: 4 EU/DL (ref 0.2–1)
WBC # BLD AUTO: 4.4 K/UL (ref 4.1–11.1)
WBC URNS QL MICRO: ABNORMAL /HPF (ref 0–4)

## 2025-08-02 PROCEDURE — 6360000002 HC RX W HCPCS: Performed by: EMERGENCY MEDICINE

## 2025-08-02 PROCEDURE — 93005 ELECTROCARDIOGRAM TRACING: CPT | Performed by: EMERGENCY MEDICINE

## 2025-08-02 PROCEDURE — 87186 SC STD MICRODIL/AGAR DIL: CPT

## 2025-08-02 PROCEDURE — 87086 URINE CULTURE/COLONY COUNT: CPT

## 2025-08-02 PROCEDURE — 96374 THER/PROPH/DIAG INJ IV PUSH: CPT

## 2025-08-02 PROCEDURE — 87088 URINE BACTERIA CULTURE: CPT

## 2025-08-02 PROCEDURE — 36415 COLL VENOUS BLD VENIPUNCTURE: CPT

## 2025-08-02 PROCEDURE — 80053 COMPREHEN METABOLIC PANEL: CPT

## 2025-08-02 PROCEDURE — 99285 EMERGENCY DEPT VISIT HI MDM: CPT

## 2025-08-02 PROCEDURE — 81001 URINALYSIS AUTO W/SCOPE: CPT

## 2025-08-02 PROCEDURE — 83880 ASSAY OF NATRIURETIC PEPTIDE: CPT

## 2025-08-02 PROCEDURE — 93970 EXTREMITY STUDY: CPT

## 2025-08-02 PROCEDURE — 85025 COMPLETE CBC W/AUTO DIFF WBC: CPT

## 2025-08-02 PROCEDURE — 71045 X-RAY EXAM CHEST 1 VIEW: CPT

## 2025-08-02 RX ORDER — CEPHALEXIN 500 MG/1
500 CAPSULE ORAL 2 TIMES DAILY
Qty: 14 CAPSULE | Refills: 0 | Status: SHIPPED | OUTPATIENT
Start: 2025-08-02 | End: 2025-08-09

## 2025-08-02 RX ORDER — FUROSEMIDE 10 MG/ML
40 INJECTION INTRAMUSCULAR; INTRAVENOUS ONCE
Status: COMPLETED | OUTPATIENT
Start: 2025-08-02 | End: 2025-08-02

## 2025-08-02 RX ADMIN — FUROSEMIDE 40 MG: 10 INJECTION, SOLUTION INTRAMUSCULAR; INTRAVENOUS at 18:23

## 2025-08-02 NOTE — ED PROVIDER NOTES
[facility] EMERGENCY DEPARTMENT  EMERGENCY DEPARTMENT ENCOUNTER       Pt Name: Stephen Baker  MRN: [id number]  Birthdate 1965  Date of evaluation: 8/2/2025  Provider: Magan Powell MD   PCP: Major Arias MD  Note Started: 8:46 PM 8/2/25     CHIEF COMPLAINT       Chief Complaint   Patient presents with    Leg Swelling     HISTORY OF PRESENT ILLNESS: 1 or more elements      History From: EMS, History limited by: Aphasia     Stephen Baker is a 59 y.o. male with a history of CAD, schizophrenia, CVA with right-sided hemiparesis and aphasia, seizure disorder, congestive heart failure on Lasix who presents for bilateral pedal edema.  Family reportedly concerned about pedal edema, brought over from [facility].  History significant limited due to baseline aphasia     Nursing Notes were all reviewed and agreed with or any disagreements were addressed in the HPI.     REVIEW OF SYSTEMS        Positives and Pertinent negatives as per HPI.    PAST HISTORY     Past Medical History:  Past Medical History:   Diagnosis Date    Acid reflux     Alcohol abuse     Arthropathy, unspecified, site unspecified     Atrial fibrillation (HCC)     Calcaneus fracture     Condyloma     Depression     Essential hypertension     H/O mitral valve repair 2006    Along with MAZE procedure    High cholesterol     History of angina     Hypertension     IHSS (idiopathic hypertrophic subaortic stenosis) (HCC)     Ill-defined condition     pacemaker    Impotence of organic origin     LA thrombus 05/2015    LA and QUAN thrombus    Pacemaker 2006    Thyroid disease     Trichilemmal cyst 4/10/2017    Unspecified cerebral artery occlusion with cerebral infarction        Past Surgical History:  Past Surgical History:   Procedure Laterality Date    APPENDECTOMY      CARDIAC CATHETERIZATION      CYST REMOVAL      OTHER SURGICAL HISTORY  4/2005    OTHER SURGICAL HISTORY  04/20/2017    excision of scalp mass    PACEMAKER  07/14/2006    [place]

## 2025-08-02 NOTE — DISCHARGE INSTRUCTIONS
Thank You!    It was a pleasure taking care of you in our Emergency Department today. We know that when you come to our Emergency Department, you are entrusting us with your health, comfort, and safety. Our physicians and nurses honor that trust, and truly appreciate the opportunity to care for you and your loved ones.      We also value your feedback. If you receive a survey about your Emergency Department experience today, please fill it out.  We care about our patients' feedback, and we listen to what you have to say.  Thank you.    Magan Powell MD  ________________________________________________________________________  I have included a copy of your lab results and/or radiologic studies from today's visit so you can have them easily available at your follow-up visit. We hope you feel better and please do not hesitate to contact the ED if you have any questions at all!    Recent Results (from the past 12 hours)   Urinalysis with Reflex to Culture    Collection Time: 08/02/25  5:02 PM    Specimen: Urine   Result Value Ref Range    Color, UA YELLOW/STRAW      Appearance CLEAR CLEAR      Specific Gravity, UA 1.011      pH, Urine 6.5 5.0 - 8.0      Protein, UA TRACE (A) NEG mg/dL    Glucose, Ur Negative NEG mg/dL    Ketones, Urine Negative NEG mg/dL    Bilirubin, Urine Negative NEG      Blood, Urine TRACE (A) NEG      Urobilinogen, Urine 4.0 (H) 0.2 - 1.0 EU/dL    Nitrite, Urine Negative NEG      Leukocyte Esterase, Urine SMALL (A) NEG      WBC, UA 20-50 0 - 4 /hpf    RBC, UA 10-20 0 - 5 /hpf    Epithelial Cells, UA FEW FEW /lpf    BACTERIA, URINE 1+ (A) NEG /hpf    Urine Culture if Indicated CULTURE NOT INDICATED BY UA RESULT      Hyaline Casts, UA 0-2 0 - 2 /lpf   CBC with Auto Differential    Collection Time: 08/02/25  5:21 PM   Result Value Ref Range    WBC 4.4 4.1 - 11.1 K/uL    RBC 6.08 (H) 4.10 - 5.70 M/uL    Hemoglobin 14.3 12.1 - 17.0 g/dL    Hematocrit 45.2 36.6 - 50.3 %    MCV 74.3 (L) 80.0 - 99.0 FL

## 2025-08-02 NOTE — ED NOTES
Spoke to patient's sister on the phone att. She states that in addition to swelling in extremities, he has been incontinent of urine. UA added on.

## 2025-08-02 NOTE — ED TRIAGE NOTES
Patient arrives via EMS from [facility]. EMS states that family was concerned about swelling in lower extremities. Patient is at baseline mental status per EMS. Able to follow all commands but speech is incomprehensible

## 2025-08-02 NOTE — ED NOTES
[facility] transport set up with ETA of 2330  Cancelled due to long wait time.  [facility] called instead. ETA 2015

## 2025-08-02 NOTE — ED NOTES
Report called to [facility] att. All questions answered. Staff expecting patient's arrival.

## 2025-08-03 LAB
EKG ATRIAL RATE: 75 BPM
EKG DIAGNOSIS: NORMAL
EKG Q-T INTERVAL: 490 MS
EKG QRS DURATION: 156 MS
EKG QTC CALCULATION (BAZETT): 539 MS
EKG R AXIS: -72 DEGREES
EKG T AXIS: 113 DEGREES
EKG VENTRICULAR RATE: 73 BPM

## 2025-08-03 NOTE — ED NOTES
[facility]ospital to Home at bedside. Patient well appearing at time of discharge. [facility] staff given facesheet and DC papers

## 2025-08-03 NOTE — ED NOTES
Verbal shift change report given to Issac (oncoming nurse) by Jhoana (offgoing nurse). Report included the following information Nurse Handoff Report and ED SBAR.

## 2025-08-06 LAB
BACTERIA SPEC CULT: ABNORMAL
CC UR VC: ABNORMAL
SERVICE CMNT-IMP: ABNORMAL

## 2025-08-20 ENCOUNTER — TRANSCRIBE ORDERS (OUTPATIENT)
Facility: HOSPITAL | Age: 60
End: 2025-08-20

## 2025-08-20 DIAGNOSIS — R79.89 HYPOURICEMIA: Primary | ICD-10-CM

## 2025-08-20 DIAGNOSIS — K76.0 FATTY METAMORPHOSIS OF LIVER: ICD-10-CM

## 2025-08-20 DIAGNOSIS — E80.6 HYPERBILIRUBINEMIA: ICD-10-CM

## 2025-08-20 DIAGNOSIS — D69.6 THROMBOCYTOPENIA, UNSPECIFIED: ICD-10-CM

## 2025-08-20 DIAGNOSIS — R74.8 ACID PHOSPHATASE ELEVATED: ICD-10-CM

## (undated) DEVICE — SUT ETHLN 3-0 18IN PS2 BLK --

## (undated) DEVICE — BASIN SET MIN W/O CAUT PNCL --

## (undated) DEVICE — (D)PREP SKN CHLRAPRP APPL 26ML -- CONVERT TO ITEM 371833

## (undated) DEVICE — SOLUTION IV 1000ML 0.9% SOD CHL

## (undated) DEVICE — TOWEL SURG W17XL27IN STD BLU COT NONFENESTRATED PREWASHED

## (undated) DEVICE — STERILE POLYISOPRENE POWDER-FREE SURGICAL GLOVES: Brand: PROTEXIS

## (undated) DEVICE — NEEDLE HYPO 25GA L1.5IN BVL ORIENTED ECLIPSE

## (undated) DEVICE — SUTURE VCRL SZ 3-0 L27IN ABSRB UD L26MM SH 1/2 CIR J416H

## (undated) DEVICE — (D)SYR 10ML 1/5ML GRAD NSAF -- PKGING CHANGE USE ITEM 338027

## (undated) DEVICE — ROCKER SWITCH PENCIL BLADE ELECTRODE, HOLSTER: Brand: EDGE

## (undated) DEVICE — DRAPE,LAPAROTOMY,T,PEDI,STERILE: Brand: MEDLINE

## (undated) DEVICE — LIGHT HANDLE: Brand: DEVON

## (undated) DEVICE — REM POLYHESIVE ADULT PATIENT RETURN ELECTRODE: Brand: VALLEYLAB

## (undated) DEVICE — Z INACTIVE NO USAGE TURNOVER KIT RM CLEANOP